# Patient Record
Sex: FEMALE | Race: WHITE | NOT HISPANIC OR LATINO | Employment: OTHER | URBAN - METROPOLITAN AREA
[De-identification: names, ages, dates, MRNs, and addresses within clinical notes are randomized per-mention and may not be internally consistent; named-entity substitution may affect disease eponyms.]

---

## 2017-08-14 ENCOUNTER — ALLSCRIPTS OFFICE VISIT (OUTPATIENT)
Dept: OTHER | Facility: OTHER | Age: 82
End: 2017-08-14

## 2017-08-14 DIAGNOSIS — R26.9 ABNORMALITY OF GAIT AND MOBILITY: ICD-10-CM

## 2017-08-14 DIAGNOSIS — Z13.6 ENCOUNTER FOR SCREENING FOR CARDIOVASCULAR DISORDERS: ICD-10-CM

## 2017-08-14 DIAGNOSIS — R53.83 OTHER FATIGUE: ICD-10-CM

## 2017-08-14 DIAGNOSIS — E03.9 HYPOTHYROIDISM: ICD-10-CM

## 2017-08-14 DIAGNOSIS — Z13.820 ENCOUNTER FOR SCREENING FOR OSTEOPOROSIS: ICD-10-CM

## 2017-08-18 ENCOUNTER — HOSPITAL ENCOUNTER (OUTPATIENT)
Dept: RADIOLOGY | Facility: HOSPITAL | Age: 82
Discharge: HOME | End: 2017-08-18
Attending: INTERNAL MEDICINE
Payer: COMMERCIAL

## 2017-08-18 ENCOUNTER — TRANSCRIBE ORDERS (OUTPATIENT)
Dept: ADMINISTRATIVE | Facility: HOSPITAL | Age: 82
End: 2017-08-18

## 2017-08-18 ENCOUNTER — LAB (OUTPATIENT)
Dept: LAB | Facility: HOSPITAL | Age: 82
End: 2017-08-18
Attending: INTERNAL MEDICINE
Payer: COMMERCIAL

## 2017-08-18 DIAGNOSIS — Z13.6 ENCOUNTER FOR SCREENING FOR CARDIOVASCULAR DISORDERS: ICD-10-CM

## 2017-08-18 DIAGNOSIS — R53.83 OTHER FATIGUE: ICD-10-CM

## 2017-08-18 DIAGNOSIS — E03.9 HYPOTHYROIDISM: ICD-10-CM

## 2017-08-18 DIAGNOSIS — R26.9 ABNORMALITY OF GAIT AND MOBILITY: ICD-10-CM

## 2017-08-18 LAB
ALBUMIN SERPL BCP-MCNC: 3.6 G/DL (ref 3.5–5)
ALP SERPL-CCNC: 79 U/L (ref 46–116)
ALT SERPL W P-5'-P-CCNC: 19 U/L (ref 12–78)
ANION GAP SERPL CALCULATED.3IONS-SCNC: 9 MMOL/L (ref 4–13)
AST SERPL W P-5'-P-CCNC: 18 U/L (ref 5–45)
BASOPHILS # BLD AUTO: 0.06 THOUSANDS/ΜL (ref 0–0.1)
BASOPHILS NFR BLD AUTO: 1 % (ref 0–1)
BILIRUB SERPL-MCNC: 0.5 MG/DL (ref 0.2–1)
BUN SERPL-MCNC: 17 MG/DL (ref 5–25)
CALCIUM SERPL-MCNC: 8.8 MG/DL (ref 8.3–10.1)
CHLORIDE SERPL-SCNC: 102 MMOL/L (ref 100–108)
CHOLEST SERPL-MCNC: 317 MG/DL (ref 50–200)
CO2 SERPL-SCNC: 27 MMOL/L (ref 21–32)
CREAT SERPL-MCNC: 0.59 MG/DL (ref 0.6–1.3)
EOSINOPHIL # BLD AUTO: 0.12 THOUSAND/ΜL (ref 0–0.61)
EOSINOPHIL NFR BLD AUTO: 2 % (ref 0–6)
ERYTHROCYTE [DISTWIDTH] IN BLOOD BY AUTOMATED COUNT: 13.1 % (ref 11.6–15.1)
GFR SERPL CREATININE-BSD FRML MDRD: 85 ML/MIN/1.73SQ M
GLUCOSE P FAST SERPL-MCNC: 88 MG/DL (ref 65–99)
HCT VFR BLD AUTO: 36.3 % (ref 34.8–46.1)
HDLC SERPL-MCNC: 100 MG/DL (ref 40–60)
HGB BLD-MCNC: 11.6 G/DL (ref 11.5–15.4)
LDLC SERPL CALC-MCNC: 205 MG/DL (ref 0–100)
LYMPHOCYTES # BLD AUTO: 2.62 THOUSANDS/ΜL (ref 0.6–4.47)
LYMPHOCYTES NFR BLD AUTO: 36 % (ref 14–44)
MCH RBC QN AUTO: 29.4 PG (ref 26.8–34.3)
MCHC RBC AUTO-ENTMCNC: 32 G/DL (ref 31.4–37.4)
MCV RBC AUTO: 92 FL (ref 82–98)
MONOCYTES # BLD AUTO: 0.57 THOUSAND/ΜL (ref 0.17–1.22)
MONOCYTES NFR BLD AUTO: 8 % (ref 4–12)
NEUTROPHILS # BLD AUTO: 3.87 THOUSANDS/ΜL (ref 1.85–7.62)
NEUTS SEG NFR BLD AUTO: 53 % (ref 43–75)
NRBC BLD AUTO-RTO: 0 /100 WBCS
PLATELET # BLD AUTO: 213 THOUSANDS/UL (ref 149–390)
PMV BLD AUTO: 11.7 FL (ref 8.9–12.7)
POTASSIUM SERPL-SCNC: 4.1 MMOL/L (ref 3.5–5.3)
PROT SERPL-MCNC: 7.1 G/DL (ref 6.4–8.2)
RBC # BLD AUTO: 3.95 MILLION/UL (ref 3.81–5.12)
SODIUM SERPL-SCNC: 138 MMOL/L (ref 136–145)
T4 FREE SERPL-MCNC: 0.9 NG/DL (ref 0.76–1.46)
TRIGL SERPL-MCNC: 58 MG/DL
TSH SERPL DL<=0.05 MIU/L-ACNC: 3.06 UIU/ML (ref 0.36–3.74)
VIT B12 SERPL-MCNC: 329 PG/ML (ref 100–900)
WBC # BLD AUTO: 7.25 THOUSAND/UL (ref 4.31–10.16)

## 2017-08-18 PROCEDURE — 84443 ASSAY THYROID STIM HORMONE: CPT

## 2017-08-18 PROCEDURE — 82607 VITAMIN B-12: CPT

## 2017-08-18 PROCEDURE — 84439 ASSAY OF FREE THYROXINE: CPT

## 2017-08-18 PROCEDURE — 80061 LIPID PANEL: CPT

## 2017-08-18 PROCEDURE — 85025 COMPLETE CBC W/AUTO DIFF WBC: CPT

## 2017-08-18 PROCEDURE — 36415 COLL VENOUS BLD VENIPUNCTURE: CPT

## 2017-08-18 PROCEDURE — 72110 X-RAY EXAM L-2 SPINE 4/>VWS: CPT

## 2017-08-18 PROCEDURE — 80053 COMPREHEN METABOLIC PANEL: CPT

## 2017-08-22 ENCOUNTER — GENERIC CONVERSION - ENCOUNTER (OUTPATIENT)
Dept: OTHER | Facility: OTHER | Age: 82
End: 2017-08-22

## 2017-09-14 ENCOUNTER — ALLSCRIPTS OFFICE VISIT (OUTPATIENT)
Dept: OTHER | Facility: OTHER | Age: 82
End: 2017-09-14

## 2017-09-21 ENCOUNTER — HOSPITAL ENCOUNTER (OUTPATIENT)
Dept: MAMMOGRAPHY | Facility: CLINIC | Age: 82
Discharge: HOME | End: 2017-09-21
Payer: COMMERCIAL

## 2017-09-21 ENCOUNTER — GENERIC CONVERSION - ENCOUNTER (OUTPATIENT)
Dept: OTHER | Facility: OTHER | Age: 82
End: 2017-09-21

## 2017-09-21 DIAGNOSIS — Z13.820 ENCOUNTER FOR SCREENING FOR OSTEOPOROSIS: ICD-10-CM

## 2017-09-21 PROCEDURE — 77080 DXA BONE DENSITY AXIAL: CPT

## 2017-11-02 ENCOUNTER — TRANSCRIBE ORDERS (OUTPATIENT)
Dept: ADMINISTRATIVE | Facility: HOSPITAL | Age: 82
End: 2017-11-02

## 2017-11-02 ENCOUNTER — ALLSCRIPTS OFFICE VISIT (OUTPATIENT)
Dept: OTHER | Facility: OTHER | Age: 82
End: 2017-11-02

## 2017-11-02 DIAGNOSIS — G45.9 TRANSIENT CEREBRAL ISCHEMIC ATTACK: ICD-10-CM

## 2017-11-02 DIAGNOSIS — G45.9 INTERMITTENT CEREBRAL ISCHEMIA: Primary | ICD-10-CM

## 2017-11-02 DIAGNOSIS — G62.9 POLYNEUROPATHY: ICD-10-CM

## 2017-11-03 NOTE — CONSULTS
Assessment  1  Gait disturbance (781 2) (R26 9)   2  TIA (transient ischemic attack) (435 9) (G45 9)   3  Polyneuropathy (356 9) (G62 9)    Plan  Polyneuropathy    · (1) LYME ANTIBODY, WESTERN BLOT; Status:Active; Requested ALW:[id number];    Perform:[facility]; ZRD:[id number]; Ordered; For:Polyneuropathy; Ordered By:Sonny Aranda;   · (1) PROTEIN ELECTRO, SERUM; Status:Active; Requested CMN:[id number];    Perform:[facility]; BAA:[id number]; Ordered; For:Polyneuropathy; Ordered By:Sonny Aranda;  TIA (transient ischemic attack)    · Aspirin 81 MG Oral Tablet Chewable; USE AS DIRECTED   Rx By: Radha Panchal; Dispense: 30 Days ; #:30 Tablet Chewable; Refill: 0;For: TIA (transient ischemic attack); JONES = N; Record   · * MRI BRAIN WO CONTRAST; Status:Need Information - Financial Authorization; Requested ICF:[id number];    Perform:[facility]; RJL:[id number]; Ordered; For:TIA (transient ischemic attack); Ordered By:Sonny Aranda;   · VAS CAROTID COMPLETE STUDY; SIDE:Bilateral; Status:Hold For - Scheduling;  Requested [id number];    Perform:[facility]; Ash Dubois; Ordered; For:TIA (transient ischemic attack); Ordered By:Sonny Aranda;   · *1 - [facility]  *  Status: Active  Requested for: 74KNZ6707   Ordered; For: TIA (transient ischemic attack); Ordered By: Radha Panchal Performed:  Due: 93LFC3022  Care Summary provided  : Yes   · Follow-up visit in 2 months Evaluation and Treatment  Follow-up  Status: Hold For -  Scheduling  Requested for: 86LJR7529   Ordered; For: TIA (transient ischemic attack); Ordered By: Radha Panchal Performed:  Due: 92LYQ5906   · Continue with our present treatment plan ; Status:Complete;   Done: 05MDO3548   Ordered; For:TIA (transient ischemic attack);  Ordered By:Sonny Aranda;    Discussion/Summary  Discussion Summary:   Patient is a 80-year-old very pleasant lady complaining of gait imbalance and episodic dizziness most likely as a result of early polyneuropathy  The incident she had in February of this year could have been a TIA, with a history of hyperlipidemia and borderline hypertension she also has a soft bruit in the left carotid  Patient is advise an MRI of the brain and a carotid ultrasound at this time and is advised to start baby aspirin 81 mg daily  For the gait imbalance secondary to her polyneuropathy she is advised protein electrophoresis and a Lyme titer with rest of the blood work being recently done  Physical therapy with gait training and balance exercises will be useful  She is advised to return back to see me in 2 months  Chief Complaint  Chief Complaint Free Text Note Form: 80year old right handed female was referred by Valerie Petersen for gait disturbance  Patient states that she feels off balance at times  History of Present Illness  HPI: Patient is a 59-year-old right-handed very pleasant lady who comes to us today with gait unsteadiness, and lightheadedness  In February of this year while the patient was in [place] she had an episode when she woke up 1 morning her legs felt extremely heavy  This episode lasted for a few hours and subsequently resolved and she did not seek any medical attention  Since then she has been experiencing a sense of imbalance, associated with lightheadedness occurring intermittently  She denies any vertigo, tinnitus, and denies any other central neurological symptoms  She also denies any speech or gait difficulty or any motor or sensory symptoms in the upper lower extremities  Several years ago she had a whiplash injury to the neck during which time she also felt very foggy for a prolonged period of time and gradually cleared  At baseline she has a history of hyperlipidemia, and has refused to take medications in the past, was recently checked for B12 and noted to have a low normal B12 level, and also suffers from osteoporosis and has lost 4 inches of height  Patient does not wish to take any medications  Review of Systems  Neurological ROS:   Constitutional: fatigue  HEENT: dryness of the eyes  Cardiovascular:  no chest pain or pressure, no palpitations present, the heart rate was not rapid or irregular, no swelling in the arms or legs, no poor circulation  Respiratory:  no unusual or persistant cough, no shortness of breath with or without exertion  Gastrointestinal:  no nausea, no vomiting, no diarrhea, no abdominal pain, no changes in bowel habits, no melena, no loss of bowel control  Genitourinary: increased frequency  Musculoskeletal:  no arthralgias, no myalgias, no immobility or loss of function, no head/neck/back pain, no pain while walking  Integumentary  no masses, no rash, no skin lesions, no livedo reticularis  Psychiatric:  no anxiety, no depression, no mood swings, no psychiatric hospitalizations, no sleep problems  Endocrine hair loss or gain  Hematologic/Lymphatic:  no unusual bleeding, no tendency for easy bruising, no clotting skin or lumps  Neurological General: lightheadedness-- and-- waking up at night  Neurological Mental Status: memory problems  Neurological Cranial Nerves: vertigo or dizziness  Neurological Motor findings include:  no tremor, no twitching, no cramping(pre/post exercise), no atrophy  Neurological Coordination:  no unsteadiness, no vertigo or dizziness, no clumsiness, no problems reaching for objects  Neurological Sensory:  no numbness, no pain, no tingling, does not fall when eyes closed or taking a shower  Neurological Gait: difficulty walking  ROS Reviewed:   ROS reviewed  Active Problems  1  Chronic insomnia (780 52) (F51 04)   2  Fatigue (780 79) (R53 83)   3  Gait disturbance (781 2) (R26 9)   4  Hypothyroidism, adult (244 9) (E03 9)   5  Osteoporosis screening (V82 81) (Z13 820)   6  Screening for cardiovascular condition (V81 2) (Z13 6)   7   Screening for genitourinary condition (V81 6) (Z13 89)    Past Medical History  1  Denied: History of mental disorder  Active Problems And Past Medical History Reviewed: The active problems and past medical history were reviewed and updated today  Surgical History  1  History of Foot Surgery  Surgical History Reviewed: The surgical history was reviewed and updated today  Family History  Mother    1  Family history of    2  Family history of Parkinson's disease (V17 2) (Z82 0)  Father    3  Family history of    4  Family history of cardiac disorder (V17 49) (Z82 49)   5  Family history of diabetes mellitus (V18 0) (Z83 3)   6  Family history of hypercholesterolemia (V18 19) (Z83 42)   7  Family history of hypertension (V17 49) (Z82 49)  Family History    8  Denied: Family history of mental disorder   9  Denied: Family history of substance abuse  Family History Reviewed: The family history was reviewed and updated today  Social History   · Caffeine use (V49 89) (F15 90)   · Former smoker (V15 82) (J54 645)   · Graduated from high school   · Denied: History of High risk sexual behavior   · Lives with adult children   · Lives with    ·    · No illicit drug use   · Occasional alcohol use   · Retired   · Two children  Social History Reviewed: The social history was reviewed and updated today  Current Meds   1  No Reported Medications Recorded  Medication List Reviewed: The medication list was reviewed and updated today  Allergies  1  Penicillins  2  Latex   3  Mold   4  Other    Vitals  Signs   Recorded: 94DXY5663 03:19PM   Heart Rate: 90  Systolic: 385  Diastolic: 88  Height: 5 ft   Weight: 110 lb   BMI Calculated: 21 48  BSA Calculated: 1 45    Physical Exam    Constitutional   General Appearance: Appears appropriate for age, healthy, well developed, appropriately groomed and appropriately dressed    Head and Face   Head and face: Atraumatic on inspection   Facial strength normal    Eyes Ophthalmoscopic examination: Vision is grossly normal  Gross visual field testing by confrontation shows no abnormalities  EOMI in both eyes  Conjunctivae clear  Eyelids normal palpebral fissures equal  Orbits exhibit normal position  No discharge from the eyes  PERRL  External inspection of ears and nose: Normal       Neck   Neck and thyroid: Normal to inspection and palpation  Cardiovascular   Auscultation of heart: Rate is regular  Rhythm is regular  Carotid pulses: Abnormal  -- Patient has a soft bruit in the left carotid  Musculoskeletal   Gait and station: Abnormal  -- Patient has a normal based gait, at times tends to sway on either side and has a positive sway on Romberg testing  Muscle strength: Normal strength throughout  Muscle tone: No atrophy, abnormal movements, flaccidity, cogwheeling or spasticity  Inspection/palpation joints, bones, muscles: Normal          Skin   Skin: skin warm and dry with no evidence of unusual rashes or suspicious lesions  Neurologic   Orientation to person, place, and time: Normal     Language: Names objects, able to repeat phrases and speaks spontaneously  Sensation: Abnormal  -- Patient has diminished sensation to pinprick and light touch in the feet bilaterally up to the ankles  She also has diminished vibratory sensation in both feet  Reflexes: DTR's are normal and symmetric bilaterally  Babinski's reflex is negative bilaterally  No pathologic ankle clonus  Coordination: Cerebellum function intact  No involuntary movement or psychomotor activity  Cortical function: Normal     Cranial Nerve Exam   II: Normal with no deficit  III,IV, VI: Normal with no deficit  V: Normal with no deficit  VII: Normal with no deficit  VIII: Normal with no deficit  IX: Normal with no deficit  X: Normal with no deficit  XI: Normal with no deficit  XII: Normal with no deficit             Future Appointments    Date/Time Provider Specialty Site 12/19/2017 10:40 AM Ra De La Torre, [facility] Internal Medicine [facility] INTERNAL MED     Signatures   Electronically signed by :  Kathleen Castro MD; Nov 2 2017  4:05PM EST                       (Author)

## 2017-11-10 ENCOUNTER — HOSPITAL ENCOUNTER (OUTPATIENT)
Dept: MRI IMAGING | Facility: HOSPITAL | Age: 82
Discharge: HOME | End: 2017-11-10
Attending: PSYCHIATRY & NEUROLOGY
Payer: COMMERCIAL

## 2017-11-10 ENCOUNTER — HOSPITAL ENCOUNTER (OUTPATIENT)
Dept: ULTRASOUND IMAGING | Facility: HOSPITAL | Age: 82
Discharge: HOME | End: 2017-11-10
Attending: PSYCHIATRY & NEUROLOGY
Payer: COMMERCIAL

## 2017-11-10 DIAGNOSIS — G45.9 TRANSIENT CEREBRAL ISCHEMIC ATTACK: ICD-10-CM

## 2017-11-10 PROCEDURE — 70551 MRI BRAIN STEM W/O DYE: CPT

## 2017-11-10 PROCEDURE — 93880 EXTRACRANIAL BILAT STUDY: CPT

## 2017-11-13 ENCOUNTER — APPOINTMENT (OUTPATIENT)
Dept: LAB | Facility: CLINIC | Age: 82
End: 2017-11-13
Payer: COMMERCIAL

## 2017-11-13 ENCOUNTER — TRANSCRIBE ORDERS (OUTPATIENT)
Dept: LAB | Facility: CLINIC | Age: 82
End: 2017-11-13

## 2017-11-13 DIAGNOSIS — G62.9 POLYNEUROPATHY: ICD-10-CM

## 2017-11-13 PROCEDURE — 36415 COLL VENOUS BLD VENIPUNCTURE: CPT

## 2017-11-13 PROCEDURE — 84165 PROTEIN E-PHORESIS SERUM: CPT

## 2017-11-13 PROCEDURE — 86617 LYME DISEASE ANTIBODY: CPT

## 2017-11-15 LAB
B BURGDOR IGG PATRN SER IB-IMP: NEGATIVE
B BURGDOR IGM PATRN SER IB-IMP: NEGATIVE
B BURGDOR18KD IGG SER QL IB: NORMAL
B BURGDOR23KD IGG SER QL IB: NORMAL
B BURGDOR23KD IGM SER QL IB: NORMAL
B BURGDOR28KD IGG SER QL IB: NORMAL
B BURGDOR30KD IGG SER QL IB: NORMAL
B BURGDOR39KD IGG SER QL IB: NORMAL
B BURGDOR39KD IGM SER QL IB: NORMAL
B BURGDOR41KD IGG SER QL IB: NORMAL
B BURGDOR41KD IGM SER QL IB: NORMAL
B BURGDOR45KD IGG SER QL IB: NORMAL
B BURGDOR58KD IGG SER QL IB: NORMAL
B BURGDOR66KD IGG SER QL IB: NORMAL
B BURGDOR93KD IGG SER QL IB: NORMAL

## 2017-11-16 LAB
ALBUMIN SERPL ELPH-MCNC: 4.52 G/DL (ref 3.5–5)
ALBUMIN SERPL ELPH-MCNC: 62.8 % (ref 52–65)
ALPHA1 GLOB SERPL ELPH-MCNC: 0.31 G/DL (ref 0.1–0.4)
ALPHA1 GLOB SERPL ELPH-MCNC: 4.3 % (ref 2.5–5)
ALPHA2 GLOB SERPL ELPH-MCNC: 0.88 G/DL (ref 0.4–1.2)
ALPHA2 GLOB SERPL ELPH-MCNC: 12.2 % (ref 7–13)
BETA GLOB ABNORMAL SERPL ELPH-MCNC: 0.43 G/DL (ref 0.4–0.8)
BETA1 GLOB SERPL ELPH-MCNC: 6 % (ref 5–13)
BETA2 GLOB SERPL ELPH-MCNC: 4.7 % (ref 2–8)
BETA2+GAMMA GLOB SERPL ELPH-MCNC: 0.34 G/DL (ref 0.2–0.5)
GAMMA GLOB ABNORMAL SERPL ELPH-MCNC: 0.72 G/DL (ref 0.5–1.6)
GAMMA GLOB SERPL ELPH-MCNC: 10 % (ref 12–22)
IGG/ALB SER: 1.69 {RATIO} (ref 1.1–1.8)
PROT PATTERN SERPL ELPH-IMP: ABNORMAL
PROT SERPL-MCNC: 7.2 G/DL (ref 6.4–8.2)

## 2017-12-19 ENCOUNTER — ALLSCRIPTS OFFICE VISIT (OUTPATIENT)
Dept: OTHER | Facility: OTHER | Age: 82
End: 2017-12-19

## 2017-12-20 ENCOUNTER — ALLSCRIPTS OFFICE VISIT (OUTPATIENT)
Dept: OTHER | Facility: OTHER | Age: 82
End: 2017-12-20

## 2017-12-20 NOTE — PROGRESS NOTES
Assessment  1  Allergic rhinitis (477 9) (J30 9)   2  Acute pansinusitis (461 8) (J01 40)    Plan  Acute pansinusitis    · Azithromycin 250 MG Oral Tablet; TAKE 2 TABLETS ON DAY 1 THEN TAKE 1 TABLET A DAYFOR 4 DAYS  Fatigue    · (1) CBC/PLT/DIFF; Status:Active; Requested for:05Apr2018;   Hypothyroidism, adult    · (1) COMPREHENSIVE METABOLIC PANEL; Status:Active; Requested for:05Apr2018;    · (1) T4, FREE; Status:Active; Requested for:05Apr2018;    · (1) TSH; Status:Active; Requested for:05Apr2018;    · Follow-up visit in 4 Months Evaluation and Treatment  Follow-up  Status: Hold For - Scheduling Requested for: 05Apr2018  Need for pneumococcal vaccine    · Prevnar 13 Intramuscular Suspension    Discussion/Summary  Discussion Summary:   Follow up with neuro tomorrow as scheduled  Continue baby aspirin  Follow here on returning from [place]  Advised patient that while she is up [place] use saline nasal spray 3-4 times daily  Counseling Documentation With Imm: The patient, patient's family was counseled regarding diagnostic results,-- instructions for management,-- risk factor reductions,-- impressions,-- risks and benefits of treatment options,-- importance of compliance with treatment  Medication SE Review and Pt Understands Tx: Possible side effects of new medications were reviewed with the patient/guardian today  The treatment plan was reviewed with the patient/guardian  The patient/guardian understands and agrees with the treatment plan   Self Referrals:   Self Referrals: No      Chief Complaint  Chief Complaint Free Text Note Form: Patient here for follow up  History of Present Illness  HPI: Follow-up80year-old female, generally healthy, seen Neurology due to gait disturbance  She was diagnosed with a TIA and polyneuropathy  She was advised to start on 81 mg aspirin daily which he has not been very compliant with, however I ask her to be more compliant and take it daily  Her previous fatigue is pretty much stable  They will be going to [place] for the winter and she remarks that she feels much better when she is down there  She complains of sinus congestion particularly at night where her mouth dries out in her nose and she has difficulty breathing  She has no specific sinus congestion  She tries to use a Neti pot daily  No fever chills  She does complain of postnasal drip especially at night  No problematic cough  No ear complaints  Review of Systems  Complete-Female:  Constitutional: no fever,-- not feeling poorly,-- no chills-- and-- not feeling tired  ENT: no complaints of earache, no loss of hearing, no nose bleeds, no nasal discharge, no sore throat, no hoarseness-- and-- as noted in HPI  Cardiovascular: No complaints of slow heart rate, no fast heart rate, no chest pain, no palpitations, no leg claudication, no lower extremity edema  Respiratory: No complaints of shortness of breath, no wheezing, no cough, no SOB on exertion, no orthopnea, no PND  Gastrointestinal: no abdominal pain  Genitourinary: no dysuria  Integumentary: no rashes  Neurological: no headache,-- no dizziness-- and-- no fainting  Hematologic/Lymphatic: No complaints of swollen glands, no swollen glands in the neck, does not bleed easily, does not bruise easily  Active Problems  1  Chronic insomnia (780 52) (F51 04)   2  Fatigue (780 79) (R53 83)   3  Gait disturbance (781 2) (R26 9)   4  Hypothyroidism, adult (244 9) (E03 9)   5  Osteoporosis screening (V82 81) (Z13 820)   6  Polyneuropathy (356 9) (G62 9)   7  Screening for cardiovascular condition (V81 2) (Z13 6)   8  Screening for genitourinary condition (V81 6) (Z13 89)   9  TIA (transient ischemic attack) (435 9) (G45 9)    Past Medical History  1  Denied: History of mental disorder  Active Problems And Past Medical History Reviewed: The active problems and past medical history were reviewed and updated today  Surgical History  1   History of Foot Surgery  Surgical History Reviewed: The surgical history was reviewed and updated today  Family History  Mother    1  Family history of    2  Family history of Parkinson's disease (V17 2) (Z82 0)  Father    3  Family history of    4  Family history of cardiac disorder (V17 49) (Z82 49)   5  Family history of diabetes mellitus (V18 0) (Z83 3)   6  Family history of hypercholesterolemia (V18 19) (Z83 42)   7  Family history of hypertension (V17 49) (Z82 49)  Family History    8  Denied: Family history of mental disorder   9  Denied: Family history of substance abuse  Family History Reviewed: The family history was reviewed and updated today  Social History   · Caffeine use (V49 89) (F15 90)   · Former smoker (V15 82) (L67 351)   · Graduated from high school   · Denied: History of High risk sexual behavior   · Lives with adult children   · Lives with    ·    · No illicit drug use   · Occasional alcohol use   · Retired   · Two children  Social History Reviewed: The social history was reviewed and updated today  The social history was reviewed and is unchanged  Current Meds   1  Aspirin 81 MG Oral Tablet Chewable; USE AS DIRECTED; Therapy: 74QBT0914 to (Evaluate:96Bmw1903); Last Rx:2017 Ordered  Medication List Reviewed: The medication list was reviewed and updated today  Allergies  1  Penicillins  2  Latex   3  Mold   4  Other    Vitals  Vital Signs    Recorded: 00EME0090 10:33AM   Temperature 98 4 F   Heart Rate 64   Systolic 152   Diastolic 92   Height 5 ft    Weight 111 lb    BMI Calculated 21 68   BSA Calculated 1 45   O2 Saturation 94     Physical Exam   Constitutional  General appearance: No acute distress, well appearing and well nourished  Eyes  Conjunctiva and lids: No swelling, erythema or discharge  Ears, Nose, Mouth, and Throat  External inspection of ears and nose: Normal    Otoscopic examination: Tympanic membranes translucent with normal light reflex  Canals patent without erythema  Nasal mucosa, septum, and turbinates: Abnormal  -- Inferior nasal turbinates are enlarged but pink, whitish nasal drainage  Oropharynx: Normal with no erythema, edema, exudate or lesions  Pulmonary  Respiratory effort: No increased work of breathing or signs of respiratory distress  Auscultation of lungs: Clear to auscultation  Cardiovascular  Auscultation of heart: Normal rate and rhythm, normal S1 and S2, without murmurs  Examination of extremities for edema and/or varicosities: Normal    Carotid pulses: Normal    Lymphatic  Palpation of lymph nodes in neck: No lymphadenopathy  Musculoskeletal  Gait and station: Normal    Skin  Skin and subcutaneous tissue: Normal without rashes or lesions     Psychiatric  Mood and affect: Normal          Future Appointments    Date/Time Provider Specialty Site   12/20/2017 02:20 PM Dale Neal MD Neurology [facility] [address]   Electronically signed by : Lissette Strauss, [facility]; Dec 19 2017 11:44AM EST                       (Author)    Electronically signed by : Moris Valera MD; Dec 19 2017 11:54AM EST

## 2017-12-21 NOTE — PROGRESS NOTES
Assessment   1  TIA (transient ischemic attack) (435 9) (G45 9)   2  Polyneuropathy (356 9) (G62 9)    Plan   Polyneuropathy    · Continue with our present treatment plan ; Status:Complete;   Done: 48YOK6268   Ordered; For:Polyneuropathy; Ordered By:Sonny Aranda;   · Follow-up visit in 6 months Evaluation and Treatment  Follow-up  Status: Complete     Done: 33YPO3901   Ordered; For: Polyneuropathy; Ordered By: Giovanny Traylor Performed:  Due: 63RTY3692; Last Updated By: Mayco Haddad; 12/20/2017 2:25:58 PM    Discussion/Summary   Discussion Summary:    Patient with a history of mild polyneuropathy is advised to continue B12 supplements, continue home exercise program, continue aspirin 81 mg daily and she will return back to see me in 6 months  Chief Complaint   Chief Complaint Free Text Note Form: Patient is here for follow up visit for her history of gait disturbance, TIA, and polyneuropathy  History of Present Illness   HPI: Patient is here for a follow-up visit accompanied with the  with a history of TIA and gait imbalance, felt to be secondary to polyneuropathy  Since her last visit she had an MRI of the brain which showed evidence of chronic ischemic changes but no evidence of any acute CVA  Carotid ultrasound showed evidence of atheromatous plaquing with less than 50% stenosis bilaterally  Serum Lyme titer and protein adequate for Fredo's was also normal  These test results were discussed with the patient and   She denies any new neurological symptoms occasionally still continues to experience gait imbalance  She is on B12 supplements  Review of Systems   Neurological ROS:      Constitutional: no fever, no chills, no recent weight gain, no recent weight loss, no complaints of feeling tired, no changes in appetite  HEENT: sinus problems        Cardiovascular:  no chest pain or pressure, no palpitations present, the heart rate was not rapid or irregular, no swelling in the arms or legs, no poor circulation  Respiratory:  no unusual or persistant cough, no shortness of breath with or without exertion  Gastrointestinal:  no nausea, no vomiting, no diarrhea, no abdominal pain, no changes in bowel habits, no melena, no loss of bowel control  Genitourinary: increased frequency  Musculoskeletal:  no arthralgias, no myalgias, no immobility or loss of function, no head/neck/back pain, no pain while walking  Integumentary  no masses, no rash, no skin lesions, no livedo reticularis  Psychiatric:  no anxiety, no depression, no mood swings, no psychiatric hospitalizations, no sleep problems  Endocrine hair loss or gain  Hematologic/Lymphatic:  no unusual bleeding, no tendency for easy bruising, no clotting skin or lumps  Neurological General: waking up at night  Neurological Mental Status: memory problems  Neurological Cranial Nerves:  no blurry or double vision, no loss of vision, no face drooping, no facial numbness or weakness, no taste or smell loss/changes, no hearing loss or ringing, no vertigo or dizziness, no dysphagia, no slurred speech  Neurological Motor findings include:  no tremor, no twitching, no cramping(pre/post exercise), no atrophy  Neurological Coordination: balance difficulties  Neurological Sensory:  no numbness, no pain, no tingling, does not fall when eyes closed or taking a shower  Neurological Gait: difficulty walking  ROS Reviewed:    ROS reviewed  Active Problems   1  Acute pansinusitis (461 8) (J01 40)   2  Allergic rhinitis (477 9) (J30 9)   3  Chronic insomnia (780 52) (F51 04)   4  Fatigue (780 79) (R53 83)   5  Gait disturbance (781 2) (R26 9)   6  Hypothyroidism, adult (244 9) (E03 9)   7  Need for pneumococcal vaccine (V03 82) (Z23)   8  Osteoporosis screening (V82 81) (Z13 820)   9  Polyneuropathy (356 9) (G62 9)   10  Screening for cardiovascular condition (V81 2) (Z13 6)   11  Screening for genitourinary condition (V81 6) (Z13 89)   12  TIA (transient ischemic attack) (435 9) (G45 9)    Past Medical History   1  Denied: History of mental disorder    Surgical History   1  History of Foot Surgery    Family History   Mother    1  Family history of    2  Family history of Parkinson's disease (V17 2) (Z82 0)  Father    3  Family history of    4  Family history of cardiac disorder (V17 49) (Z82 49)   5  Family history of diabetes mellitus (V18 0) (Z83 3)   6  Family history of hypercholesterolemia (V18 19) (Z83 42)   7  Family history of hypertension (V17 49) (Z82 49)  Family History    8  Denied: Family history of mental disorder   9  Denied: Family history of substance abuse    Social History    · Caffeine use (V49 89) (F15 90)   · Former smoker (V1 82) (E45 703)   · Graduated from high school   · Denied: History of High risk sexual behavior   · Lives with adult children   · Lives with    ·    · No illicit drug use   · Occasional alcohol use   · Retired   · Two children  Social History Reviewed: The social history was reviewed and updated today  Current Meds    1  Aspirin 81 MG Oral Tablet Chewable; USE AS DIRECTED; Therapy: 03YMX7738 to (Evaluate:54Jnn7379); Last Rx:2017 Ordered   2  Azithromycin 250 MG Oral Tablet; TAKE 2 TABLETS ON DAY 1 THEN TAKE 1 TABLET A     DAY FOR 4 DAYS; Therapy: 14JTB0260 to (Last Rx:63Kbe6557) Ordered  Medication List Reviewed: The medication list was reviewed and updated today  Allergies   1  Penicillins  2  Latex   3  Mold   4  Other    Vitals   Signs   Recorded: 2017 01:56PM   Heart Rate: 72  Systolic: 113  Diastolic: 88  Height: 5 ft 1 in  Weight: 117 lb   BMI Calculated: 22 11  BSA Calculated: 1 5    Physical Exam        Constitutional      General appearance: No acute distress, well appearing and well nourished  Musculoskeletal      Gait and station: Normal gait, stance and balance  Muscle strength: Normal strength throughout  Muscle tone: No atrophy, abnormal movements, flaccidity, cogwheeling or spasticity  Neurologic      Orientation to person, place, and time: Normal        Language: Names objects, able to repeat phrases and speaks spontaneously  3rd, 4th, and 6th cranial nerves: Normal        7th cranial nerve: Normal        Sensation: Abnormal  -- Diminished sensation was noted in both feet to pinprick and light touch  Reflexes: Normal        Coordination: Normal        Future Appointments      Date/Time Provider Specialty Site   05/03/2018 08:00 [facility]     Signatures    Electronically signed by :  Mony Zapata MD; Dec 20 2017  2:35PM EST                       (Author)

## 2018-01-12 VITALS
BODY MASS INDEX: 20.2 KG/M2 | HEIGHT: 61 IN | OXYGEN SATURATION: 99 % | HEART RATE: 70 BPM | SYSTOLIC BLOOD PRESSURE: 138 MMHG | DIASTOLIC BLOOD PRESSURE: 84 MMHG | TEMPERATURE: 98.6 F | WEIGHT: 107 LBS

## 2018-01-12 NOTE — RESULT NOTES
Verified Results  * DXA BONE DENSITY SPINE HIP AND PELVIS [id number] 09:07AM Master Orestes Order Number: [id number]    - Patient Instructions: To schedule this appointment, please contact Central Scheduling at [phone]  Test Name Result Flag Reference   DXA BONE DENSITY SPINE HIP AND PELVIS (Report)     CENTRAL DXA SCAN     CLINICAL HISTORY:  80year old post-menopausal  female who walks and who does not take calcium or vitamin D supplements  There is a stated loss in height of 2 1/2 inches  TECHNIQUE: Bone densitometry was performed using a H[employer] Horizon C bone densitometer  Regions of interest appear properly placed  There are degenerative changes of the lumbar spine, which can artificially elevate bone mineral density results  COMPARISON: None  RESULTS:    LUMBAR SPINE: L1-L4:   BMD 0 657 gm/cm2   T-score -3 5   Z-score -0 7     LEFT TOTAL HIP:   BMD 0 576 gm/cm2   T-score -3 0   Z-score -0 7     LEFT FEMORAL NECK:   BMD 0 492 gm/cm2   T-score -3 2   Z-score -0 8     LEFT FOREARM-ONE 3RD REGION:   BMD 0 524 gm/cm2   T-score -2 8   Z-score 0 9         IMPRESSION:   1  Based on the [facility] classification, the T-score of -3 5 in the lumbar spine is consistent with osteoporosis  2  [facility], prescription therapy is recommended with a T-score of -2 5 or less in the spine or hip after appropriate evaluation to exclude secondary causes  3  With the stated loss in height of 2 1/2 inches, consideration to obtaining thoraco-lumbar spine films may be helpful to exclude silent vertebral fractures, which can increase the risk for future fracture  4  A daily intake of at least 1200 mg Calcium and 800 to 1000 IU of Vitamin D, as well as weight bearing and muscle strengthening exercise, fall prevention and avoidance of tobacco and excessive alcohol intake as basic preventive measures are    suggested     5  Repeat DXA in 18 - 24 months, on the same machine, as clinically indicated  The 10 year risk of hip fracture is 9 4%, with the 10 year risk of major osteoporotic fracture being 21%, as calculated by the [facility] fracture risk assessment tool (FRAX)  The current NOF guidelines recommend treating patients with FRAX 10 year risk score    of >3% for hip fracture and >20% for major osteoporotic fracture        WHO CLASSIFICATION:   Normal (a T-score of -1 0 or higher)   Low bone mineral density (a T-score of less than -1 0 but higher than -2 5)   Osteoporosis (a T-score of -2 5 or less)   Severe osteoporosis (a T-score of -2 5 or less with a fragility fracture)             Workstation performed: [id number]     Signed by:   Regan Conn MD   9/21/17

## 2018-01-14 VITALS
SYSTOLIC BLOOD PRESSURE: 140 MMHG | HEART RATE: 90 BPM | DIASTOLIC BLOOD PRESSURE: 88 MMHG | BODY MASS INDEX: 21.6 KG/M2 | WEIGHT: 110 LBS | HEIGHT: 60 IN

## 2018-01-14 VITALS
DIASTOLIC BLOOD PRESSURE: 80 MMHG | BODY MASS INDEX: 20.3 KG/M2 | HEIGHT: 61 IN | WEIGHT: 107.5 LBS | OXYGEN SATURATION: 98 % | RESPIRATION RATE: 78 BRPM | SYSTOLIC BLOOD PRESSURE: 126 MMHG

## 2018-01-16 NOTE — RESULT NOTES
Verified Results  * XR SPINE LUMBAR MINIMUM 4 VIEWS NON INJURY [id number] 07:55AM Mary Carmen Bocanegra Order Number: [id number]     Test Name Result Flag Reference   XR SPINE LUMBAR MINIMUM 4 VIEWS (Report)     LUMBAR SPINE     INDICATION: Abnormal gait and mobility  COMPARISON: None     VIEWS: AP, lateral, bilateral oblique and coned down projections     IMAGES: 5     FINDINGS:     A mid lumbar scoliosis is present  There is 3 mm of anterolisthesis of L4 on L5  Vertebral alignment is otherwise unremarkable  There is diffuse demineralization  There is no evidence of fracture or dislocation  The disc spaces are normal in height  Degenerative facet disease is present in the lower lumbar spine  Aortic calcification is present  IMPRESSION:     Degenerative changes in the lower lumbar spine  No evidence of acute bony abnormality in the lumbar spine         Workstation performed: [id number]     Signed by:   Jenny Meléndez MD   8/22/17

## 2018-01-22 VITALS
HEIGHT: 60 IN | WEIGHT: 111 LBS | OXYGEN SATURATION: 94 % | SYSTOLIC BLOOD PRESSURE: 136 MMHG | TEMPERATURE: 98.4 F | HEART RATE: 64 BPM | BODY MASS INDEX: 21.79 KG/M2 | DIASTOLIC BLOOD PRESSURE: 92 MMHG

## 2018-01-23 VITALS
DIASTOLIC BLOOD PRESSURE: 88 MMHG | HEIGHT: 61 IN | SYSTOLIC BLOOD PRESSURE: 120 MMHG | BODY MASS INDEX: 22.09 KG/M2 | HEART RATE: 72 BPM | WEIGHT: 117 LBS

## 2018-04-05 DIAGNOSIS — E03.9 HYPOTHYROIDISM: ICD-10-CM

## 2018-04-05 DIAGNOSIS — R53.83 OTHER FATIGUE: ICD-10-CM

## 2018-04-26 ENCOUNTER — TELEPHONE (OUTPATIENT)
Dept: NEUROLOGY | Facility: CLINIC | Age: 83
End: 2018-04-26

## 2018-04-26 NOTE — TELEPHONE ENCOUNTER
Dr Luis West we call patient to schedule 6M follow up as you requested  However patient told me she does not need to come back for a follow up because she is felling better

## 2018-06-08 ENCOUNTER — APPOINTMENT (OUTPATIENT)
Dept: LAB | Facility: HOSPITAL | Age: 83
End: 2018-06-08
Payer: COMMERCIAL

## 2018-06-08 DIAGNOSIS — E03.9 HYPOTHYROIDISM: ICD-10-CM

## 2018-06-08 DIAGNOSIS — R53.83 OTHER FATIGUE: ICD-10-CM

## 2018-06-08 LAB
ALBUMIN SERPL BCP-MCNC: 3.6 G/DL (ref 3.5–5)
ALP SERPL-CCNC: 87 U/L (ref 46–116)
ALT SERPL W P-5'-P-CCNC: 15 U/L (ref 12–78)
ANION GAP SERPL CALCULATED.3IONS-SCNC: 5 MMOL/L (ref 4–13)
AST SERPL W P-5'-P-CCNC: 17 U/L (ref 5–45)
BASOPHILS # BLD AUTO: 0.05 THOUSANDS/ΜL (ref 0–0.1)
BASOPHILS NFR BLD AUTO: 1 % (ref 0–1)
BILIRUB SERPL-MCNC: 0.4 MG/DL (ref 0.2–1)
BUN SERPL-MCNC: 20 MG/DL (ref 5–25)
CALCIUM SERPL-MCNC: 9 MG/DL (ref 8.3–10.1)
CHLORIDE SERPL-SCNC: 103 MMOL/L (ref 100–108)
CO2 SERPL-SCNC: 31 MMOL/L (ref 21–32)
CREAT SERPL-MCNC: 0.67 MG/DL (ref 0.6–1.3)
EOSINOPHIL # BLD AUTO: 0.19 THOUSAND/ΜL (ref 0–0.61)
EOSINOPHIL NFR BLD AUTO: 3 % (ref 0–6)
ERYTHROCYTE [DISTWIDTH] IN BLOOD BY AUTOMATED COUNT: 13.2 % (ref 11.6–15.1)
GFR SERPL CREATININE-BSD FRML MDRD: 81 ML/MIN/1.73SQ M
GLUCOSE SERPL-MCNC: 79 MG/DL (ref 65–140)
HCT VFR BLD AUTO: 36.9 % (ref 34.8–46.1)
HGB BLD-MCNC: 11.8 G/DL (ref 11.5–15.4)
IMM GRANULOCYTES # BLD AUTO: 0.02 THOUSAND/UL (ref 0–0.2)
IMM GRANULOCYTES NFR BLD AUTO: 0 % (ref 0–2)
LYMPHOCYTES # BLD AUTO: 2.51 THOUSANDS/ΜL (ref 0.6–4.47)
LYMPHOCYTES NFR BLD AUTO: 35 % (ref 14–44)
MCH RBC QN AUTO: 29.6 PG (ref 26.8–34.3)
MCHC RBC AUTO-ENTMCNC: 32 G/DL (ref 31.4–37.4)
MCV RBC AUTO: 93 FL (ref 82–98)
MONOCYTES # BLD AUTO: 0.64 THOUSAND/ΜL (ref 0.17–1.22)
MONOCYTES NFR BLD AUTO: 9 % (ref 4–12)
NEUTROPHILS # BLD AUTO: 3.74 THOUSANDS/ΜL (ref 1.85–7.62)
NEUTS SEG NFR BLD AUTO: 52 % (ref 43–75)
NRBC BLD AUTO-RTO: 0 /100 WBCS
PLATELET # BLD AUTO: 226 THOUSANDS/UL (ref 149–390)
PMV BLD AUTO: 11.4 FL (ref 8.9–12.7)
POTASSIUM SERPL-SCNC: 4.5 MMOL/L (ref 3.5–5.3)
PROT SERPL-MCNC: 7.3 G/DL (ref 6.4–8.2)
RBC # BLD AUTO: 3.99 MILLION/UL (ref 3.81–5.12)
SODIUM SERPL-SCNC: 139 MMOL/L (ref 136–145)
T4 FREE SERPL-MCNC: 0.9 NG/DL (ref 0.76–1.46)
TSH SERPL DL<=0.05 MIU/L-ACNC: 3.36 UIU/ML (ref 0.36–3.74)
WBC # BLD AUTO: 7.15 THOUSAND/UL (ref 4.31–10.16)

## 2018-06-08 PROCEDURE — 36415 COLL VENOUS BLD VENIPUNCTURE: CPT

## 2018-06-08 PROCEDURE — 84443 ASSAY THYROID STIM HORMONE: CPT

## 2018-06-08 PROCEDURE — 85025 COMPLETE CBC W/AUTO DIFF WBC: CPT

## 2018-06-08 PROCEDURE — 80053 COMPREHEN METABOLIC PANEL: CPT

## 2018-06-08 PROCEDURE — 84439 ASSAY OF FREE THYROXINE: CPT

## 2018-06-15 ENCOUNTER — OFFICE VISIT (OUTPATIENT)
Dept: INTERNAL MEDICINE CLINIC | Facility: CLINIC | Age: 83
End: 2018-06-15
Payer: COMMERCIAL

## 2018-06-15 VITALS
WEIGHT: 112 LBS | BODY MASS INDEX: 20.61 KG/M2 | OXYGEN SATURATION: 96 % | HEART RATE: 88 BPM | HEIGHT: 62 IN | SYSTOLIC BLOOD PRESSURE: 136 MMHG | RESPIRATION RATE: 20 BRPM | TEMPERATURE: 98.1 F | DIASTOLIC BLOOD PRESSURE: 84 MMHG

## 2018-06-15 DIAGNOSIS — L23.7 POISON IVY DERMATITIS: ICD-10-CM

## 2018-06-15 DIAGNOSIS — F51.04 CHRONIC INSOMNIA: ICD-10-CM

## 2018-06-15 DIAGNOSIS — E78.5 HYPERLIPIDEMIA, UNSPECIFIED HYPERLIPIDEMIA TYPE: ICD-10-CM

## 2018-06-15 DIAGNOSIS — J30.9 ALLERGIC RHINITIS, UNSPECIFIED SEASONALITY, UNSPECIFIED TRIGGER: Primary | ICD-10-CM

## 2018-06-15 PROBLEM — G45.9 TIA (TRANSIENT ISCHEMIC ATTACK): Status: ACTIVE | Noted: 2017-11-02

## 2018-06-15 PROBLEM — R09.89 LEFT CAROTID BRUIT: Status: ACTIVE | Noted: 2018-06-15

## 2018-06-15 PROBLEM — E03.9 HYPOTHYROIDISM, ADULT: Status: ACTIVE | Noted: 2017-08-14

## 2018-06-15 PROCEDURE — 1101F PT FALLS ASSESS-DOCD LE1/YR: CPT | Performed by: PHYSICIAN ASSISTANT

## 2018-06-15 PROCEDURE — 3725F SCREEN DEPRESSION PERFORMED: CPT | Performed by: PHYSICIAN ASSISTANT

## 2018-06-15 PROCEDURE — 99214 OFFICE O/P EST MOD 30 MIN: CPT | Performed by: PHYSICIAN ASSISTANT

## 2018-06-15 RX ORDER — TRIAMCINOLONE ACETONIDE 1 MG/G
CREAM TOPICAL 2 TIMES DAILY
Qty: 30 G | Refills: 0 | Status: SHIPPED | OUTPATIENT
Start: 2018-06-15 | End: 2018-09-14 | Stop reason: ALTCHOICE

## 2018-06-15 NOTE — PROGRESS NOTES
Assessment/Plan:   Patient Instructions   Advised trial of Claritin 10 mg daily through middle of June during her allergy season  This is for the chronic postnasal drip  Will make triamcinolone cream available for poison ivy  Schedule follow-up 6 months with repeat labs  Return in about 6 months (around 12/15/2018) for Next scheduled follow up-Pt preference  Diagnoses and all orders for this visit:    Allergic rhinitis, unspecified seasonality, unspecified trigger    Hyperlipidemia, unspecified hyperlipidemia type  -     Comprehensive metabolic panel; Future  -     Lipid panel; Future    Poison ivy dermatitis  -     triamcinolone (KENALOG) 0 1 % cream; Apply topically 2 (two) times a day    Chronic insomnia          Subjective:      Patient ID: Daryn Brumfield is a 80 y o  female  Follow-up    Patient generally feels well  Two main complaints 1 of chronic insomnia, 2nd is a request for a steroid cream due to poison ivy  We again discussed chronic insomnia, I did give her a handout on sleep hygiene for her to try to practice  I talked about not wanting her to be on controlled medication for sleep  Labs reviewed with patient, all look in good control  Hyperlipidemia:  Very high HDL  She has read articles relating to Women not being on statin medication  She does have elevated total cholesterol and LDL but does not want to start statin medication  Chronic postnasal drip:  This is most likely from allergies  It starts when she gets back from [place] in May and usually goes to mid June  Lately she has noted that it has been more chronic throughout the year  She was advised to try Claritin 10 mg daily to see if this provides any relief          ALLERGIES:  Allergies   Allergen Reactions    Latex     Molds & Smuts     Penicillins        CURRENT MEDICATIONS:    Current Outpatient Prescriptions:     triamcinolone (KENALOG) 0 1 % cream, Apply topically 2 (two) times a day, Disp: 30 g, Rfl: 0    ACTIVE PROBLEM LIST:  Patient Active Problem List   Diagnosis    Allergic rhinitis    Chronic insomnia    Hypothyroidism, adult    TIA (transient ischemic attack)    Left carotid bruit    Poison ivy dermatitis    Hyperlipidemia       PAST MEDICAL HISTORY:  No past medical history on file  PAST SURGICAL HISTORY:  Past Surgical History:   Procedure Laterality Date    FOOT SURGERY  2007       FAMILY HISTORY:  Family History   Problem Relation Age of Onset   [facility] Parkinsonism Mother     Diabetes Father     Hyperlipidemia Father     Hypertension Father     Other Father         cardiac Disorder       SOCIAL HISTORY:  Social History     Social History    Marital status: /Civil Union     Spouse name: N/A    Number of children: 2    Years of education: N/A     Occupational History    Retired      Social History Main Topics    Smoking status: Former Smoker    Smokeless tobacco: Never Used      Comment: Quit smoking at 21years of age   [facility] Alcohol use Yes      Comment: occasional    Drug use: No    Sexual activity: No     Other Topics Concern    Not on file     Social History Narrative    Caffeine use    Graduated from high school    Lives with adult children    Lives with     Denied: History of High risk sexual behavior           Review of Systems   Constitutional: Negative for activity change, chills, fatigue and fever  HENT: Positive for congestion and postnasal drip  Negative for rhinorrhea, sinus pain, sinus pressure, sneezing and sore throat  Eyes: Negative for discharge  Respiratory: Negative for cough, chest tightness and shortness of breath  Cardiovascular: Negative for chest pain, palpitations and leg swelling  Gastrointestinal: Negative for abdominal pain  Genitourinary: Negative for difficulty urinating  Musculoskeletal: Negative for arthralgias and myalgias  Skin: Negative for rash  Allergic/Immunologic: Negative for immunocompromised state  Neurological: Negative for dizziness, syncope, weakness, light-headedness and headaches  Hematological: Negative for adenopathy  Does not bruise/bleed easily  Psychiatric/Behavioral: Negative for dysphoric mood  The patient is not nervous/anxious  Objective:  Vitals:    06/15/18 0818   BP: 136/84   BP Location: Left arm   Patient Position: Sitting   Cuff Size: Adult   Pulse: 88   Resp: 20   Temp: 98 1 °F (36 7 °C)   TempSrc: Temporal   SpO2: 96%   Weight: 50 8 kg (112 lb)   Height: 5' 2" (1 575 m)        Physical Exam   Constitutional: She is oriented to person, place, and time  She appears well-developed and well-nourished  No distress  HENT:   Mouth/Throat: No oropharyngeal exudate  Mild enlargement of inferior turbinates, mucous membranes are boggy  Conjunctiva are granular  Neck: Neck supple  Carotid bruit is present (Left carotid bruit present, carotid ultrasound present in the EMR dated 11/17 showing no significant stenosis)  No thyromegaly present  Cardiovascular: Normal rate, regular rhythm and normal heart sounds  Pulmonary/Chest: Effort normal and breath sounds normal    Abdominal: Soft  There is no tenderness  Musculoskeletal: She exhibits no edema  Lymphadenopathy:     She has no cervical adenopathy  Neurological: She is alert and oriented to person, place, and time  Skin: Skin is warm and dry  No rash noted  Psychiatric: She has a normal mood and affect  Her behavior is normal    Nursing note and vitals reviewed          RESULTS:    Recent Results (from the past 1008 hour(s))   TSH, 3rd generation    Collection Time: 06/08/18  9:18 AM   Result Value Ref Range    TSH 3RD GENERATON 3 358 0 358 - 3 740 uIU/mL   T4, free    Collection Time: 06/08/18  9:18 AM   Result Value Ref Range    Free T4 0 90 0 76 - 1 46 ng/dL   Comprehensive metabolic panel    Collection Time: 06/08/18  9:18 AM   Result Value Ref Range    Sodium 139 136 - 145 mmol/L    Potassium 4 5 3 5 - 5 3 mmol/L    Chloride 103 100 - 108 mmol/L    CO2 31 21 - 32 mmol/L    Anion Gap 5 4 - 13 mmol/L    BUN 20 5 - 25 mg/dL    Creatinine 0 67 0 60 - 1 30 mg/dL    Glucose 79 65 - 140 mg/dL    Calcium 9 0 8 3 - 10 1 mg/dL    AST 17 5 - 45 U/L    ALT 15 12 - 78 U/L    Alkaline Phosphatase 87 46 - 116 U/L    Total Protein 7 3 6 4 - 8 2 g/dL    Albumin 3 6 3 5 - 5 0 g/dL    Total Bilirubin 0 40 0 20 - 1 00 mg/dL    eGFR 81 ml/min/1 73sq m   CBC and differential    Collection Time: 06/08/18  9:18 AM   Result Value Ref Range    WBC 7 15 4 31 - 10 16 Thousand/uL    RBC 3 99 3 81 - 5 12 Million/uL    Hemoglobin 11 8 11 5 - 15 4 g/dL    Hematocrit 36 9 34 8 - 46 1 %    MCV 93 82 - 98 fL    MCH 29 6 26 8 - 34 3 pg    MCHC 32 0 31 4 - 37 4 g/dL    RDW 13 2 11 6 - 15 1 %    MPV 11 4 8 9 - 12 7 fL    Platelets 335 854 - 283 Thousands/uL    nRBC 0 /100 WBCs    Neutrophils Relative 52 43 - 75 %    Immat GRANS % 0 0 - 2 %    Lymphocytes Relative 35 14 - 44 %    Monocytes Relative 9 4 - 12 %    Eosinophils Relative 3 0 - 6 %    Basophils Relative 1 0 - 1 %    Neutrophils Absolute 3 74 1 85 - 7 62 Thousands/µL    Immature Grans Absolute 0 02 0 00 - 0 20 Thousand/uL    Lymphocytes Absolute 2 51 0 60 - 4 47 Thousands/µL    Monocytes Absolute 0 64 0 17 - 1 22 Thousand/µL    Eosinophils Absolute 0 19 0 00 - 0 61 Thousand/µL    Basophils Absolute 0 05 0 00 - 0 10 Thousands/µL       This note was created with voice recognition software  Phonic, grammatical and spelling errors may be present within the note as a result

## 2018-06-15 NOTE — PATIENT INSTRUCTIONS
Advised trial of Claritin 10 mg daily through middle of June during her allergy season  This is for the chronic postnasal drip  Will make triamcinolone cream available for poison ivy  Schedule follow-up 6 months with repeat labs

## 2018-09-14 ENCOUNTER — OFFICE VISIT (OUTPATIENT)
Dept: INTERNAL MEDICINE CLINIC | Facility: CLINIC | Age: 83
End: 2018-09-14
Payer: COMMERCIAL

## 2018-09-14 VITALS
HEIGHT: 62 IN | DIASTOLIC BLOOD PRESSURE: 88 MMHG | HEART RATE: 87 BPM | BODY MASS INDEX: 20.8 KG/M2 | OXYGEN SATURATION: 97 % | SYSTOLIC BLOOD PRESSURE: 138 MMHG | WEIGHT: 113 LBS | TEMPERATURE: 97.5 F | RESPIRATION RATE: 16 BRPM

## 2018-09-14 DIAGNOSIS — L85.3 DRY SKIN DERMATITIS: Primary | ICD-10-CM

## 2018-09-14 PROBLEM — L23.7 POISON IVY DERMATITIS: Status: RESOLVED | Noted: 2018-06-15 | Resolved: 2018-09-14

## 2018-09-14 PROCEDURE — 3008F BODY MASS INDEX DOCD: CPT | Performed by: PHYSICIAN ASSISTANT

## 2018-09-14 PROCEDURE — 99213 OFFICE O/P EST LOW 20 MIN: CPT | Performed by: PHYSICIAN ASSISTANT

## 2018-09-14 PROCEDURE — 1160F RVW MEDS BY RX/DR IN RCRD: CPT | Performed by: PHYSICIAN ASSISTANT

## 2018-09-14 RX ORDER — TRIAMCINOLONE ACETONIDE 1 MG/G
CREAM TOPICAL 3 TIMES DAILY
Qty: 30 G | Refills: 1 | Status: SHIPPED | OUTPATIENT
Start: 2018-09-14 | End: 2018-09-14 | Stop reason: SDUPTHER

## 2018-09-14 RX ORDER — TRIAMCINOLONE ACETONIDE 1 MG/G
CREAM TOPICAL 3 TIMES DAILY
Qty: 30 G | Refills: 1
Start: 2018-09-14 | End: 2018-10-11 | Stop reason: ALTCHOICE

## 2018-09-14 NOTE — PROGRESS NOTES
Assessment/Plan:          Return in about 2 weeks (around 9/28/2018) for Recheck  Diagnoses and all orders for this visit:    Dry skin dermatitis  -     triamcinolone (KENALOG) 0 1 % cream; Apply topically 3 (three) times a day    Other orders  -     Discontinue: triamcinolone (KENALOG) 0 1 % cream; Apply topically 3 (three) times a day          Subjective:      Patient ID: Betsy Osuna is a 80 y o  female  HPI    ALLERGIES:  Allergies   Allergen Reactions    Latex     Molds & Smuts     Penicillins        CURRENT MEDICATIONS:    Current Outpatient Prescriptions:     triamcinolone (KENALOG) 0 1 % cream, Apply topically 3 (three) times a day, Disp: 30 g, Rfl: 1    ACTIVE PROBLEM LIST:  Patient Active Problem List   Diagnosis    Allergic rhinitis    Chronic insomnia    Hypothyroidism, adult    TIA (transient ischemic attack)    Left carotid bruit    Hyperlipidemia    Dry skin dermatitis       PAST MEDICAL HISTORY:  No past medical history on file      PAST SURGICAL HISTORY:  Past Surgical History:   Procedure Laterality Date    FOOT SURGERY  2007       FAMILY HISTORY:  Family History   Problem Relation Age of Onset   [facility]hel[facility] Catalino Parkinsonism Mother     Diabetes Father     Hyperlipidemia Father     Hypertension Father     Other Father         cardiac Disorder       SOCIAL HISTORY:  Social History     Social History    Marital status: /Civil Union     Spouse name: N/A    Number of children: 2    Years of education: N/A     Occupational History    Retired      Social History Main Topics    Smoking status: Former Smoker    Smokeless tobacco: Never Used      Comment: Quit smoking at 21years of age   [facility] Bae Alcohol use Yes      Comment: occasional    Drug use: No    Sexual activity: No     Other Topics Concern    Not on file     Social History Narrative    Caffeine use    Graduated from high school    Lives with adult children    Lives with     Denied: History of High risk sexual behavior           Review of Systems   Constitutional: Negative for activity change, chills, fatigue and fever  HENT: Negative for congestion  Eyes: Negative for discharge  Respiratory: Negative for cough, chest tightness and shortness of breath  Cardiovascular: Negative for chest pain, palpitations and leg swelling  Gastrointestinal: Negative for abdominal pain  Genitourinary: Negative for difficulty urinating  Musculoskeletal: Negative for arthralgias and myalgias  Skin: Positive for rash  Allergic/Immunologic: Negative for immunocompromised state  Neurological: Negative for dizziness, syncope, weakness, light-headedness and headaches  Hematological: Negative for adenopathy  Does not bruise/bleed easily  Psychiatric/Behavioral: Positive for sleep disturbance  Negative for dysphoric mood  The patient is not nervous/anxious  Objective:  Vitals:    09/14/18 1127   BP: 138/88   BP Location: Right arm   Patient Position: Sitting   Cuff Size: Adult   Pulse: 87   Resp: 16   Temp: 97 5 °F (36 4 °C)   TempSrc: Temporal   SpO2: 97%   Weight: 51 3 kg (113 lb)   Height: 5' 2" (1 575 m)        Physical Exam   Constitutional: She is oriented to person, place, and time  She appears well-developed and well-nourished  No distress  Neck: Neck supple  Cardiovascular: Normal rate, regular rhythm and normal heart sounds  Pulmonary/Chest: Effort normal and breath sounds normal    Musculoskeletal: She exhibits no edema  Lymphadenopathy:     She has no cervical adenopathy  Neurological: She is alert and oriented to person, place, and time  Skin: Skin is warm and dry  Rash (Dry scaly skin of pretibial areas bilaterally, slight pinkness at the area, no secondary signs of infection) noted  Psychiatric: She has a normal mood and affect  Her behavior is normal    Nursing note and vitals reviewed          RESULTS:    No results found for this or any previous visit (from the past 1008 hour(s))  This note was created with voice recognition software  Phonic, grammatical and spelling errors may be present within the note as a result

## 2018-09-14 NOTE — PATIENT INSTRUCTIONS
Will give a trial of topical triamcinolone cream to dry skin areas that are itchy on legs  Will recheck in 2 weeks and also  Assess elevated blood pressure

## 2018-09-20 ENCOUNTER — APPOINTMENT (OUTPATIENT)
Dept: LAB | Facility: HOSPITAL | Age: 83
End: 2018-09-20
Payer: COMMERCIAL

## 2018-09-20 DIAGNOSIS — E78.5 HYPERLIPIDEMIA, UNSPECIFIED HYPERLIPIDEMIA TYPE: ICD-10-CM

## 2018-09-20 LAB
ALBUMIN SERPL BCP-MCNC: 3.6 G/DL (ref 3.5–5)
ALP SERPL-CCNC: 68 U/L (ref 46–116)
ALT SERPL W P-5'-P-CCNC: 19 U/L (ref 12–78)
ANION GAP SERPL CALCULATED.3IONS-SCNC: 8 MMOL/L (ref 4–13)
AST SERPL W P-5'-P-CCNC: 16 U/L (ref 5–45)
BILIRUB SERPL-MCNC: 0.4 MG/DL (ref 0.2–1)
BUN SERPL-MCNC: 20 MG/DL (ref 5–25)
CALCIUM SERPL-MCNC: 8.8 MG/DL (ref 8.3–10.1)
CHLORIDE SERPL-SCNC: 105 MMOL/L (ref 100–108)
CHOLEST SERPL-MCNC: 330 MG/DL (ref 50–200)
CO2 SERPL-SCNC: 29 MMOL/L (ref 21–32)
CREAT SERPL-MCNC: 0.65 MG/DL (ref 0.6–1.3)
GFR SERPL CREATININE-BSD FRML MDRD: 82 ML/MIN/1.73SQ M
GLUCOSE P FAST SERPL-MCNC: 82 MG/DL (ref 65–99)
HDLC SERPL-MCNC: 95 MG/DL (ref 40–60)
LDLC SERPL CALC-MCNC: 223 MG/DL (ref 0–100)
NONHDLC SERPL-MCNC: 235 MG/DL
POTASSIUM SERPL-SCNC: 4.1 MMOL/L (ref 3.5–5.3)
PROT SERPL-MCNC: 7 G/DL (ref 6.4–8.2)
SODIUM SERPL-SCNC: 142 MMOL/L (ref 136–145)
TRIGL SERPL-MCNC: 61 MG/DL

## 2018-09-20 PROCEDURE — 80053 COMPREHEN METABOLIC PANEL: CPT

## 2018-09-20 PROCEDURE — 80061 LIPID PANEL: CPT

## 2018-09-20 PROCEDURE — 36415 COLL VENOUS BLD VENIPUNCTURE: CPT

## 2018-10-11 ENCOUNTER — IMMUNIZATION (OUTPATIENT)
Dept: INTERNAL MEDICINE CLINIC | Facility: CLINIC | Age: 83
End: 2018-10-11
Payer: COMMERCIAL

## 2018-10-11 ENCOUNTER — TELEPHONE (OUTPATIENT)
Dept: INTERNAL MEDICINE CLINIC | Facility: CLINIC | Age: 83
End: 2018-10-11

## 2018-10-11 DIAGNOSIS — Z23 NEED FOR VACCINATION: Primary | ICD-10-CM

## 2018-10-11 DIAGNOSIS — Z23 ENCOUNTER FOR IMMUNIZATION: ICD-10-CM

## 2018-10-11 DIAGNOSIS — L85.3 DRY SKIN DERMATITIS: Primary | ICD-10-CM

## 2018-10-11 PROCEDURE — 90662 IIV NO PRSV INCREASED AG IM: CPT

## 2018-10-11 PROCEDURE — 4040F PNEUMOC VAC/ADMIN/RCVD: CPT

## 2018-10-11 PROCEDURE — G0008 ADMIN INFLUENZA VIRUS VAC: HCPCS

## 2018-10-11 RX ORDER — TRIAMCINOLONE ACETONIDE 5 MG/G
CREAM TOPICAL 3 TIMES DAILY
Qty: 30 G | Refills: 2 | Status: SHIPPED | OUTPATIENT
Start: 2018-10-11 | End: 2020-01-09 | Stop reason: ALTCHOICE

## 2018-10-11 NOTE — TELEPHONE ENCOUNTER
Patient came in today stating the triamcinolone cream helped alittle bit but she is still itchy and has dry patches  Is there anything else she can try?

## 2018-10-11 NOTE — TELEPHONE ENCOUNTER
This is low dose triamcinolone  Lets first try the stronger dose since she did get some relief  I sent it to her pharmacy

## 2019-05-23 ENCOUNTER — APPOINTMENT (OUTPATIENT)
Dept: LAB | Facility: HOSPITAL | Age: 84
End: 2019-05-23
Payer: COMMERCIAL

## 2019-05-23 ENCOUNTER — HOSPITAL ENCOUNTER (OUTPATIENT)
Dept: RADIOLOGY | Facility: HOSPITAL | Age: 84
Discharge: HOME | End: 2019-05-23
Payer: COMMERCIAL

## 2019-05-23 ENCOUNTER — OFFICE VISIT (OUTPATIENT)
Dept: INTERNAL MEDICINE CLINIC | Facility: CLINIC | Age: 84
End: 2019-05-23
Payer: COMMERCIAL

## 2019-05-23 VITALS
HEIGHT: 62 IN | OXYGEN SATURATION: 93 % | SYSTOLIC BLOOD PRESSURE: 140 MMHG | HEART RATE: 80 BPM | RESPIRATION RATE: 18 BRPM | WEIGHT: 108 LBS | BODY MASS INDEX: 19.88 KG/M2 | DIASTOLIC BLOOD PRESSURE: 78 MMHG

## 2019-05-23 DIAGNOSIS — J18.9 PNEUMONIA DUE TO INFECTIOUS ORGANISM, UNSPECIFIED LATERALITY, UNSPECIFIED PART OF LUNG: ICD-10-CM

## 2019-05-23 DIAGNOSIS — J18.9 PNEUMONIA DUE TO INFECTIOUS ORGANISM, UNSPECIFIED LATERALITY, UNSPECIFIED PART OF LUNG: Primary | ICD-10-CM

## 2019-05-23 LAB
ALBUMIN SERPL BCP-MCNC: 3.1 G/DL (ref 3.5–5)
ALP SERPL-CCNC: 113 U/L (ref 46–116)
ALT SERPL W P-5'-P-CCNC: 18 U/L (ref 12–78)
ANION GAP SERPL CALCULATED.3IONS-SCNC: 9 MMOL/L (ref 4–13)
AST SERPL W P-5'-P-CCNC: 15 U/L (ref 5–45)
BASOPHILS # BLD AUTO: 0.1 THOUSANDS/ΜL (ref 0–0.1)
BASOPHILS NFR BLD AUTO: 1 % (ref 0–1)
BILIRUB SERPL-MCNC: 0.2 MG/DL (ref 0.2–1)
BUN SERPL-MCNC: 20 MG/DL (ref 5–25)
CALCIUM SERPL-MCNC: 8.8 MG/DL (ref 8.3–10.1)
CHLORIDE SERPL-SCNC: 106 MMOL/L (ref 100–108)
CO2 SERPL-SCNC: 28 MMOL/L (ref 21–32)
CREAT SERPL-MCNC: 0.71 MG/DL (ref 0.6–1.3)
EOSINOPHIL # BLD AUTO: 0.15 THOUSAND/ΜL (ref 0–0.61)
EOSINOPHIL NFR BLD AUTO: 2 % (ref 0–6)
ERYTHROCYTE [DISTWIDTH] IN BLOOD BY AUTOMATED COUNT: 14.6 % (ref 11.6–15.1)
GFR SERPL CREATININE-BSD FRML MDRD: 78 ML/MIN/1.73SQ M
GLUCOSE SERPL-MCNC: 110 MG/DL (ref 65–140)
HCT VFR BLD AUTO: 36 % (ref 34.8–46.1)
HGB BLD-MCNC: 11 G/DL (ref 11.5–15.4)
IMM GRANULOCYTES # BLD AUTO: 0.05 THOUSAND/UL (ref 0–0.2)
IMM GRANULOCYTES NFR BLD AUTO: 1 % (ref 0–2)
LYMPHOCYTES # BLD AUTO: 2.57 THOUSANDS/ΜL (ref 0.6–4.47)
LYMPHOCYTES NFR BLD AUTO: 30 % (ref 14–44)
MCH RBC QN AUTO: 29.2 PG (ref 26.8–34.3)
MCHC RBC AUTO-ENTMCNC: 30.6 G/DL (ref 31.4–37.4)
MCV RBC AUTO: 96 FL (ref 82–98)
MONOCYTES # BLD AUTO: 0.83 THOUSAND/ΜL (ref 0.17–1.22)
MONOCYTES NFR BLD AUTO: 10 % (ref 4–12)
NEUTROPHILS # BLD AUTO: 4.89 THOUSANDS/ΜL (ref 1.85–7.62)
NEUTS SEG NFR BLD AUTO: 56 % (ref 43–75)
NRBC BLD AUTO-RTO: 0 /100 WBCS
PLATELET # BLD AUTO: 421 THOUSANDS/UL (ref 149–390)
PMV BLD AUTO: 11.8 FL (ref 8.9–12.7)
POTASSIUM SERPL-SCNC: 3.9 MMOL/L (ref 3.5–5.3)
PROT SERPL-MCNC: 7.3 G/DL (ref 6.4–8.2)
RBC # BLD AUTO: 3.77 MILLION/UL (ref 3.81–5.12)
SODIUM SERPL-SCNC: 143 MMOL/L (ref 136–145)
WBC # BLD AUTO: 8.59 THOUSAND/UL (ref 4.31–10.16)

## 2019-05-23 PROCEDURE — 1160F RVW MEDS BY RX/DR IN RCRD: CPT | Performed by: PHYSICIAN ASSISTANT

## 2019-05-23 PROCEDURE — 80053 COMPREHEN METABOLIC PANEL: CPT

## 2019-05-23 PROCEDURE — 36415 COLL VENOUS BLD VENIPUNCTURE: CPT

## 2019-05-23 PROCEDURE — 1101F PT FALLS ASSESS-DOCD LE1/YR: CPT | Performed by: PHYSICIAN ASSISTANT

## 2019-05-23 PROCEDURE — 3725F SCREEN DEPRESSION PERFORMED: CPT | Performed by: PHYSICIAN ASSISTANT

## 2019-05-23 PROCEDURE — 85025 COMPLETE CBC W/AUTO DIFF WBC: CPT

## 2019-05-23 PROCEDURE — 71046 X-RAY EXAM CHEST 2 VIEWS: CPT

## 2019-05-23 PROCEDURE — 99213 OFFICE O/P EST LOW 20 MIN: CPT | Performed by: PHYSICIAN ASSISTANT

## 2019-08-05 ENCOUNTER — OFFICE VISIT (OUTPATIENT)
Dept: INTERNAL MEDICINE CLINIC | Facility: CLINIC | Age: 84
End: 2019-08-05
Payer: COMMERCIAL

## 2019-08-05 VITALS
SYSTOLIC BLOOD PRESSURE: 124 MMHG | DIASTOLIC BLOOD PRESSURE: 70 MMHG | RESPIRATION RATE: 18 BRPM | HEIGHT: 62 IN | BODY MASS INDEX: 20.06 KG/M2 | WEIGHT: 109 LBS | OXYGEN SATURATION: 96 % | HEART RATE: 85 BPM

## 2019-08-05 DIAGNOSIS — E78.5 HYPERLIPIDEMIA, UNSPECIFIED HYPERLIPIDEMIA TYPE: ICD-10-CM

## 2019-08-05 DIAGNOSIS — Z23 NEED FOR PNEUMOCOCCAL VACCINATION: ICD-10-CM

## 2019-08-05 DIAGNOSIS — E03.9 HYPOTHYROIDISM, ADULT: ICD-10-CM

## 2019-08-05 DIAGNOSIS — M79.10 MYALGIA: ICD-10-CM

## 2019-08-05 DIAGNOSIS — Z00.00 ENCOUNTER FOR MEDICARE ANNUAL WELLNESS EXAM: Primary | ICD-10-CM

## 2019-08-05 PROBLEM — M81.0 OSTEOPOROSIS: Status: ACTIVE | Noted: 2019-08-05

## 2019-08-05 PROCEDURE — 1170F FXNL STATUS ASSESSED: CPT | Performed by: PHYSICIAN ASSISTANT

## 2019-08-05 PROCEDURE — 90732 PPSV23 VACC 2 YRS+ SUBQ/IM: CPT | Performed by: PHYSICIAN ASSISTANT

## 2019-08-05 PROCEDURE — 4040F PNEUMOC VAC/ADMIN/RCVD: CPT | Performed by: PHYSICIAN ASSISTANT

## 2019-08-05 PROCEDURE — G0439 PPPS, SUBSEQ VISIT: HCPCS | Performed by: PHYSICIAN ASSISTANT

## 2019-08-05 PROCEDURE — G0009 ADMIN PNEUMOCOCCAL VACCINE: HCPCS | Performed by: PHYSICIAN ASSISTANT

## 2019-08-05 PROCEDURE — 99214 OFFICE O/P EST MOD 30 MIN: CPT | Performed by: PHYSICIAN ASSISTANT

## 2019-08-05 PROCEDURE — 1125F AMNT PAIN NOTED PAIN PRSNT: CPT | Performed by: PHYSICIAN ASSISTANT

## 2019-08-05 PROCEDURE — 1160F RVW MEDS BY RX/DR IN RCRD: CPT | Performed by: PHYSICIAN ASSISTANT

## 2019-08-05 NOTE — PATIENT INSTRUCTIONS
Will check patient's screening labs, and report results to her when available     Schedule follow-up when returning from [place]  Obesity   AMBULATORY CARE:   Obesity  is when your body mass index (BMI) is greater than 30  Your healthcare provider will use your height and weight to measure your BMI  The risks of obesity include  many health problems, such as injuries or physical disability  You may need tests to check for the following:  · Diabetes     · High blood pressure or high cholesterol     · Heart disease     · Gallbladder or liver disease     · Cancer of the colon, breast, prostate, liver, or kidney     · Sleep apnea     · Arthritis or gout  Seek care immediately if:   · You have a severe headache, confusion, or difficulty speaking  · You have weakness on one side of your body  · You have chest pain, sweating, or shortness of breath  Contact your healthcare provider if:   · You have symptoms of gallbladder or liver disease, such as pain in your upper abdomen  · You have knee or hip pain and discomfort while walking  · You have symptoms of diabetes, such as intense hunger and thirst, and frequent urination  · You have symptoms of sleep apnea, such as snoring or daytime sleepiness  · You have questions or concerns about your condition or care  Treatment for obesity  focuses on helping you lose weight to improve your health  Even a small decrease in BMI can reduce the risk for many health problems  Your healthcare provider will help you set a weight-loss goal   · Lifestyle changes  are the first step in treating obesity  These include making healthy food choices and getting regular physical activity  Your healthcare provider may suggest a weight-loss program that involves coaching, education, and therapy  · Medicine  may help you lose weight when it is used with a healthy diet and physical activity       · Surgery  can help you lose weight if you are very obese and have other health problems  There are several types of weight-loss surgery  Ask your healthcare provider for more information  Be successful losing weight:   · Set small, realistic goals  An example of a small goal is to walk for 20 minutes 5 days a week  Anther goal is to lose 5% of your body weight  · Tell friends, family members, and coworkers about your goals  and ask for their support  Ask a friend to lose weight with you, or join a weight-loss support group  · Identify foods or triggers that may cause you to overeat , and find ways to avoid them  Remove tempting high-calorie foods from your home and workplace  Place a bowl of fresh fruit on your kitchen counter  If stress causes you to eat, then find other ways to cope with stress  · Keep a diary to track what you eat and drink  Also write down how many minutes of physical activity you do each day  Weigh yourself once a week and record it in your diary  Eating changes: You will need to eat 500 to 1,000 fewer calories each day than you currently eat to lose 1 to 2 pounds a week  The following changes will help you cut calories:  · Eat smaller portions  Use small plates, no larger than 9 inches in diameter  Fill your plate half full of fruits and vegetables  Measure your food using measuring cups until you know what a serving size looks like  · Eat 3 meals and 1 or 2 snacks each day  Plan your meals in advance  Aguiar Comber and eat at home most of the time  Eat slowly  · Eat fruits and vegetables at every meal   They are low in calories and high in fiber, which makes you feel full  Do not add butter, margarine, or cream sauce to vegetables  Use herbs to season steamed vegetables  · Eat less fat and fewer fried foods  Eat more baked or grilled chicken and fish  These protein sources are lower in calories and fat than red meat  Limit fast food  Dress your salads with olive oil and vinegar instead of bottled dressing       · Limit the amount of sugar you eat  Do not drink sugary beverages  Limit alcohol  Activity changes:  Physical activity is good for your body in many ways  It helps you burn calories and build strong muscles  It decreases stress and depression, and improves your mood  It can also help you sleep better  Talk to your healthcare provider before you begin an exercise program   · Exercise for at least 30 minutes 5 days a week  Start slowly  Set aside time each day for physical activity that you enjoy and that is convenient for you  It is best to do both weight training and an activity that increases your heart rate, such as walking, bicycling, or swimming  · Find ways to be more active  Do yard work and housecleaning  Walk up the stairs instead of using elevators  Spend your leisure time going to events that require walking, such as outdoor festivals or fairs  This extra physical activity can help you lose weight and keep it off  Follow up with your healthcare provider as directed: You may need to meet with a dietitian  Write down your questions so you remember to ask them during your visits  © 2017 [facility] Information is for End User's use only and may not be sold, redistributed or otherwise used for commercial purposes  All illustrations and images included in CareNotes® are the copyrighted property of A D A M , Inc  or [employer]  The above information is an  only  It is not intended as medical advice for individual conditions or treatments  Talk to your doctor, nurse or pharmacist before following any medical regimen to see if it is safe and effective for you  Urinary Incontinence   WHAT YOU NEED TO KNOW:   What is urinary incontinence? Urinary incontinence (UI) is when you lose control of your bladder  What causes UI? UI occurs because your bladder cannot store or empty urine properly   The following are the most common types of UI:  · Stress incontinence  is when you leak urine due to increased bladder pressure  This may happen when you cough, sneeze, or exercise  · Urge incontinence  is when you feel the need to urinate right away and leak urine accidentally  · Mixed incontinence  is when you have both stress and urge UI  What are the signs and symptoms of UI?   · You feel like your bladder does not empty completely when you urinate  · You urinate often and need to urinate immediately  · You leak urine when you sleep, or you wake up with the urge to urinate  · You leak urine when you cough, sneeze, exercise, or laugh  How is UI diagnosed? Your healthcare provider will ask how often you leak urine and whether you have stress or urge symptoms  Tell him which medicines you take, how often you urinate, and how much liquid you drink each day  You may need any of the following tests:  · Urine tests  may show infection or kidney function  · A pelvic exam  may be done to check for blockages  A pelvic exam will also show if your bladder, uterus, or other organs have moved out of place  · An x-ray, ultrasound, or CT  may show problems with parts of your urinary system  You may be given contrast liquid to help your organs show up better in the pictures  Tell the healthcare provider if you have ever had an allergic reaction to contrast liquid  Do not enter the MRI room with anything metal  Metal can cause serious injury  Tell the healthcare provider if you have any metal in or on your body  · A bladder scan  will show how much urine is left in your bladder after you urinate  You will be asked to urinate and then healthcare providers will use a small ultrasound machine to check the urine left in your bladder  · Cystometry  is used to check the function of your urinary system  Your healthcare provider checks the pressure in your bladder while filling it with fluid  Your bladder pressure may also be tested when your bladder is full and while you urinate    How is UI treated? · Medicines  can help strengthen your bladder control  · Electrical stimulation  is used to send a small amount of electrical energy to your pelvic floor muscles  This helps control your bladder function  Electrodes may be placed outside your body or in your rectum  For women, the electrodes may be placed in the vagina  · A bulking agent  may be injected into the wall of your urethra to make it thicker  This helps keep your urethra closed and decreases urine leakage  · Devices  such as a clamp, pessary, or tampon may help stop urine leaks  Ask your healthcare provider for more information about these and other devices  · Surgery  may be needed if other treatments do not work  Several types of surgery can help improve your bladder control  Ask your healthcare provider for more information about the surgery you may need  How can I manage my symptoms? · Do pelvic muscle exercises often  Your pelvic muscles help you stop urinating  Squeeze these muscles tight for 5 seconds, then relax for 5 seconds  Gradually work up to squeezing for 10 seconds  Do 3 sets of 15 repetitions a day, or as directed  This will help strengthen your pelvic muscles and improve bladder control  · A catheter  may be used to help empty your bladder  A catheter is a tiny, plastic tube that is put into your bladder to drain your urine  Your healthcare provider may tell you to use a catheter to prevent your bladder from getting too full and leaking urine  · Keep a UI record  Write down how often you leak urine and how much you leak  Make a note of what you were doing when you leaked urine  · Train your bladder  Go to the bathroom at set times, such as every 2 hours, even if you do not feel the urge to go  You can also try to hold your urine when you feel the urge to go  For example, hold your urine for 5 minutes when you feel the urge to go  As that becomes easier, hold your urine for 10 minutes       · Drink liquids as directed  Ask your healthcare provider how much liquid to drink each day and which liquids are best for you  You may need to limit the amount of liquid you drink to help control your urine leakage  Limit or do not have drinks that contain caffeine or alcohol  Do not drink any liquid right before you go to bed  · Prevent constipation  Eat a variety of high-fiber foods  Good examples are high-fiber cereals, beans, vegetables, and whole-grain breads  Prune juice may help make your bowel movement softer  Walking is the best way to trigger your intestines to have a bowel movement  · Exercise regularly and maintain a healthy weight  Ask your healthcare provider how much you should weigh and about the best exercise plan for you  Weight loss and exercise will decrease pressure on your bladder and help you control your leakage  Ask him to help you create a weight loss plan if you are overweight  When should I seek immediate care? · You have severe pain  · You are confused or cannot think clearly  When should I contact my healthcare provider? · You have a fever  · You see blood in your urine  · You have pain when you urinate  · You have new or worse pain, even after treatment  · Your mouth feels dry or you have vision changes  · Your urine is cloudy or smells bad  · You have questions or concerns about your condition or care  CARE AGREEMENT:   You have the right to help plan your care  Learn about your health condition and how it may be treated  Discuss treatment options with your caregivers to decide what care you want to receive  You always have the right to refuse treatment  The above information is an  only  It is not intended as medical advice for individual conditions or treatments  Talk to your doctor, nurse or pharmacist before following any medical regimen to see if it is safe and effective for you    © 2017 [employer]ordin Moreno Information is for End User's use only and may not be sold, redistributed or otherwise used for commercial purposes  All illustrations and images included in CareNotes® are the copyrighted property of A D A M , Inc  or Josh Kenney  Cigarette Smoking and Your Health   AMBULATORY CARE:   Risks to your health if you smoke:  Nicotine and other chemicals found in tobacco damage every cell in your body  Even if you are a light smoker, you have an increased risk for cancer, heart disease, and lung disease  If you are pregnant or have diabetes, smoking increases your risk for complications  Benefits to your health if you stop smoking:   · You decrease respiratory symptoms such as coughing, wheezing, and shortness of breath  · You reduce your risk for cancers of the lung, mouth, throat, kidney, bladder, pancreas, stomach, and cervix  If you already have cancer, you increase the benefits of chemotherapy  You also reduce your risk for cancer returning or a second cancer from developing  · You reduce your risk for heart disease, blood clots, heart attack, and stroke  · You reduce your risk for lung infections, and diseases such as pneumonia, asthma, chronic bronchitis, and emphysema  · Your circulation improves  More oxygen can be delivered to your body  If you have diabetes, you lower your risk for complications, such as kidney, artery, and eye diseases  You also lower your risk for nerve damage  Nerve damage can lead to amputations, poor vision, and blindness  · You improve your body's ability to heal and to fight infections  Benefits to the health of others if you stop smoking:  Tobacco is harmful to nonsmokers who breathe in your secondhand smoke  The following are ways the health of others around you may improve when you stop smoking:  · You lower the risks for lung cancer and heart disease in nonsmoking adults       · If you are pregnant, you lower the risk for miscarriage, early delivery, low birth weight, and stillbirth  You also lower your baby's risk for SIDS, obesity, developmental delay, and neurobehavioral problems, such as ADHD  · If you have children, you lower their risk for ear infections, colds, pneumonia, bronchitis, and asthma  For more information and support to stop smoking:   · Smokefree  gov  Phone: 3- 041 - 705-4706  Web Address: [employer]  Follow up with your healthcare provider as directed:  Write down your questions so you remember to ask them during your visits  © 2017 [address] [facility] Information is for End User's use only and may not be sold, redistributed or otherwise used for commercial purposes  All illustrations and images included in CareNotes® are the copyrighted property of A D A M , Inc  or [employer]  The above information is an  only  It is not intended as medical advice for individual conditions or treatments  Talk to your doctor, nurse or pharmacist before following any medical regimen to see if it is safe and effective for you  Fall Prevention   AMBULATORY CARE:   Fall prevention  includes ways to make your home and other areas safer  It also includes ways you can move more carefully to prevent a fall  Health conditions that cause changes in your blood pressure, vision, or muscle strength and coordination may increase your risk for falls  Medicines may also increase your risk for falls if they make you dizzy, weak, or sleepy  Call 911 or have someone else call if:   · You have fallen and are unconscious  · You have fallen and cannot move part of your body  Contact your healthcare provider if:   · You have fallen and have pain or a headache  · You have questions or concerns about your condition or care  Fall prevention tips:   · Stand or sit up slowly  This may help you keep your balance and prevent falls  · Use assistive devices as directed    Your healthcare provider may suggest that you use a cane or walker to help you keep your balance  You may need to have grab bars put in your bathroom near the toilet or in the shower  · Wear shoes that fit well and have soles that   Wear shoes both inside and outside  Use slippers with good   Do not wear shoes with high heels  · Wear a personal alarm  This is a device that allows you to call 911 if you fall and need help  Ask your healthcare provider for more information  · Stay active  Exercise can help strengthen your muscles and improve your balance  Your healthcare provider may recommend water aerobics or walking  He or she may also recommend physical therapy to improve your coordination  Never start an exercise program without talking to your healthcare provider first      · Manage your medical conditions  Keep all appointments with your healthcare providers  Visit your eye doctor as directed  Home safety tips:   · Add items to prevent falls in the bathroom  Put nonslip strips on your bath or shower floor to prevent you from slipping  Use a bath mat if you do not have carpet in the bathroom  This will prevent you from falling when you step out of the bath or shower  Use a shower seat so you do not need to stand while you shower  Sit on the toilet or a chair in your bathroom to dry yourself and put on clothing  This will prevent you from losing your balance from drying or dressing yourself while you are standing  · Keep paths clear  Remove books, shoes, and other objects from walkways and stairs  Place cords for telephones and lamps out of the way so that you do not need to walk over them  Tape them down if you cannot move them  Remove small rugs  If you cannot remove a rug, secure it with double-sided tape  This will prevent you from tripping  · Install bright lights in your home  Use night lights to help light paths to the bathroom or kitchen  Always turn on the light before you start walking      · Keep items you use often on shelves within reach  Do not use a step stool to help you reach an item  · Paint or place reflective tape on the edges of your stairs  This will help you see the stairs better  Follow up with your healthcare provider as directed:  Write down your questions so you remember to ask them during your visits  © 2017 [address] Information is for End User's use only and may not be sold, redistributed or otherwise used for commercial purposes  All illustrations and images included in CareNotes® are the copyrighted property of A D A M , Inc  or Josh Kenney  The above information is an  only  It is not intended as medical advice for individual conditions or treatments  Talk to your doctor, nurse or pharmacist before following any medical regimen to see if it is safe and effective for you  Advance Directives   WHAT YOU NEED TO KNOW:   What are advance directives? Advance directives are legal documents that state your wishes and plans for medical care  These plans are made ahead of time in case you lose your ability to make decisions for yourself  Advance directives can apply to any medical decision, such as the treatments you want, and if you want to donate organs  What are the types of advance directives? There are many types of advance directives, and each state has rules about how to use them  You may choose a combination of any of the following:  · Living will: This is a written record of the treatment you want  You can also choose which treatments you do not want, which to limit, and which to stop at a certain time  This includes surgery, medicine, IV fluid, and tube feedings  · Durable power of  for healthcare [facility]): This is a written record that states who you want to make healthcare choices for you when you are unable to make them for yourself  This person, called a proxy, is usually a family member or a friend   You may choose more than 1 proxy     · Do not resuscitate (DNR) order:  A DNR order is used in case your heart stops beating or you stop breathing  It is a request not to have certain forms of treatment, such as CPR  A DNR order may be included in other types of advance directives  · Medical directive: This covers the care that you want if you are in a coma, near death, or unable to make decisions for yourself  You can list the treatments you want for each condition  Treatment may include pain medicine, surgery, blood transfusions, dialysis, IV or tube feedings, and a ventilator (breathing machine)  · Values history: This document has questions about your views, beliefs, and how you feel and think about life  This information can help others choose the care that you would choose  Why are advance directives important? An advance directive helps you control your care  Although spoken wishes may be used, it is better to have your wishes written down  Spoken wishes can be misunderstood, or not followed  Treatments may be given even if you do not want them  An advance directive may make it easier for your family to make difficult choices about your care  How do I decide what to put in my advance directives? · Make informed decisions:  Make sure you fully understand treatments or care you may receive  Think about the benefits and problems your decisions could cause for you or your family  Talk to healthcare providers if you have concerns or questions before you write down your wishes  You may also want to talk with your Anabaptist or , or a   Check your state laws to make sure that what you put in your advance directive is legal      · Sign all forms:  Sign and date your advance directive when you have finished  You may also need 2 witnesses to sign the forms  Witnesses cannot be your doctor or his staff, your spouse, heirs or beneficiaries, people you owe money to, or your chosen proxy   Talk to your family, proxy, and healthcare providers about your advance directive  Give each person a copy, and keep one for yourself in a place you can get to easily  Do not keep it hidden or locked away  · Review and revise your plans: You can revise your advance directive at any time, as long as you are able to make decisions  Review your plan every year, and when there are changes in your life, or your health  When you make changes, let your family, proxy, and healthcare providers know  Give each a new copy  Where can I find more information? · [employer]  [address] , [place] [address]  Phone: 8- 883 - 388-6246  Phone: 5- 932 - [phone]  Web Address: http://www  aafp org  · [address] [facility])  [address] [address] [facility], [address]  Phone: 6- [phone] - [phone]  Phone: [phone]  Web Address: Davin benitez  CARE AGREEMENT:   You have the right to help plan your care  To help with this plan, you must learn about your health condition and treatment options  You must also learn about advance directives and how they are used  Work with your healthcare providers to decide what care will be used to treat you  You always have the right to refuse treatment  The above information is an  only  It is not intended as medical advice for individual conditions or treatments  Talk to your doctor, nurse or pharmacist before following any medical regimen to see if it is safe and effective for you  © 2017 [address]  Information is for End User's use only and may not be sold, redistributed or otherwise used for commercial purposes  All illustrations and images included in CareNotes® are the copyrighted property of A D A M , Inc  or Josh Kenney

## 2019-08-05 NOTE — PROGRESS NOTES
Assessment and Plan:   As per office notes same day  Problem List Items Addressed This Visit     None      Visit Diagnoses     Need for pneumococcal vaccination    -  Primary    Relevant Orders    PNEUMOCOCCAL POLYSACCHARIDE VACCINE 23-VALENT =>3YO SQ IM         History of Present Illness:     Patient presents for Welcome to Medicare visit  Questionnaire was reviewed, clarified, and updated with the patient and her  was present today  Patient Care Team:  Pavan Urena MD as PCP - Nadine Sandhu MD     Review of Systems:     Review of Systems   Gastrointestinal: Negative for bowel incontinence  Psychiatric/Behavioral: The patient is not nervous/anxious  As per office notes same day  Problem List:     Patient Active Problem List   Diagnosis    Allergic rhinitis    Chronic insomnia    Hypothyroidism, adult    TIA (transient ischemic attack)    Left carotid bruit    Hyperlipidemia    Dry skin dermatitis      Past Medical and Surgical History:     No past medical history on file  Past Surgical History:   Procedure Laterality Date    FOOT SURGERY  2007      Family History:     Family History   Problem Relation Age of Onset   Camille Marino Parkinsonism Mother     Diabetes Father     Hyperlipidemia Father     Hypertension Father     Other Father         cardiac Disorder      Social History:     Social History     Tobacco Use   Smoking Status Former Smoker   Smokeless Tobacco Never Used   Tobacco Comment    Quit smoking at 21years of age     Social History     Substance and Sexual Activity   Alcohol Use Yes    Comment: occasional     Social History     Substance and Sexual Activity   Drug Use No      Medications and Allergies:     Current Outpatient Medications   Medication Sig Dispense Refill    triamcinolone (KENALOG) 0 5 % cream Apply topically 3 (three) times a day 30 g 2     No current facility-administered medications for this visit        Allergies   Allergen Reactions    Latex     Molds & Smuts     Penicillins       Immunizations:     Immunization History   Administered Date(s) Administered    INFLUENZA 10/02/2014, 10/02/2014, 10/27/2016, 10/27/2016, 11/19/2017    Influenza Split High Dose Preservative Free IM 11/19/2017    Influenza, high dose seasonal 0 5 mL 10/11/2018    Pneumococcal Conjugate 13-Valent 12/01/2016, 12/01/2016, 12/19/2017      Medicare Screening Tests and Risk Assessments:     Ruben Barboza is here for her Initial Wellness visit  Health Risk Assessment:  Patient rates overall health as good  Patient feels that their physical health rating is Same  Eyesight was rated as Same  Hearing was rated as Same  Patient feels that their emotional and mental health rating is Same  Pain experienced by patient in the last 7 days has been None  Patient states that she has experienced no weight loss or gain in last 6 months  Emotional/Mental Health:  Patient has not been feeling nervous/anxious  PHQ-9 Depression Screening:    Frequency of the following problems over the past two weeks:      1  Little interest or pleasure in doing things: 0 - not at all      2  Feeling down, depressed, or hopeless: 0 - not at all  PHQ-2 Score: 0          Broken Bones/Falls: Fall Risk Assessment:    In the past year, patient has experienced: No history of falling in past year          Bladder/Bowel:  Patient has not leaked urine accidently in the last six months  Patient reports no loss of bowel control  Immunizations:  Patient has had a flu vaccination within the last year  Patient has received a pneumonia shot  Patient has received a shingles shot  Patient has received tetanus/diphtheria shot  Home Safety:  Patient does not have trouble with stairs inside or outside of their home  Patient currently reports that there are safety hazards present in home , working smoke alarms, working carbon monoxide detectors        Preventative Screenings:   Breast cancer screening performed, colon cancer screen completed, cholesterol screen completed, glaucoma eye exam completed,     Nutrition:  Current diet: Regular, No Added Salt and Limited junk food with servings of the following:    Medications:  Patient is not currently taking any over-the-counter supplements  Patient is able to manage medications  Lifestyle Choices:  Patient reports no tobacco use  Patient has smoked or used tobacco in the past   Patient has stopped her tobacco use  Patient reports no alcohol use  Patient drives a vehicle  Patient wears seat belt  Current level of exercise of physical activity described by patient as: walking  Activities of Daily Living:  Can get out of bed by his or her self, able to dress self, able to make own meals, able to do own shopping, able to bathe self, can do own laundry/housekeeping, can manage own money, pay bills and track expenses    Previous Hospitalizations:  Hospitalization or ED visit in past 12 months  Number of hospitalizations within the last year: 1-2        Advanced Directives:  Patient has decided on a power of   Patient has spoken to designated power of   Patient has completed advanced directive          Preventative Screening/Counseling:      Cardiovascular:      General: Risks and Benefits Discussed and Screening Current          Diabetes:      General: Risks and Benefits Discussed and Screening Current          Colorectal Cancer:      General: Risks and Benefits Discussed and Screening Current          Breast Cancer:      General: Risks and Benefits Discussed and Screening Current          Cervical Cancer:      General: Risks and Benefits Discussed and Screening Current          Osteoporosis:      General: Risks and Benefits Discussed          AAA:      General: Risks and Benefits Discussed          Glaucoma:      General: Risks and Benefits Discussed and Screening Current          HIV:      General: Risks and Benefits Discussed          Hepatitis C: General: Risks and Benefits Discussed and Screening Not Indicated        Advanced Directives:   Patient has living will for healthcare, has durable POA for healthcare, patient has an advanced directive  Information on ACP and/or AD not provided  No 5 wishes given  End of life assessment reviewed with patient  Provider agrees with end of life decisions   Additional Comments: WILL BRING COPY TO OFFICE  Immunizations:      Influenza: Risks & Benefits Discussed and Influenza UTD This Year      Pneumococcal: Risks & Benefits Discussed and Pneumococcal Due Today      Shingrix: Risks & Benefits Discussed      TD: Risks & Benefits Discussed          No exam data present     Physical Exam:     There were no vitals taken for this visit  Physical Exam    As per office notes same day

## 2019-08-05 NOTE — PROGRESS NOTES
Assessment/Plan:    Will check patient's screening labs, and report results to her when available     Schedule follow-up when returning from [place]  Return if symptoms worsen or fail to improve, for when returns from [place]  Diagnoses and all orders for this visit:    Encounter for Medicare annual wellness exam    Hypothyroidism, adult  -     CBC and differential; Future  -     Comprehensive metabolic panel; Future  -     T4, free; Future  -     TSH, 3rd generation; Future    Hyperlipidemia, unspecified hyperlipidemia type  -     Lipid panel; Future    Need for pneumococcal vaccination  -     PNEUMOCOCCAL POLYSACCHARIDE VACCINE 23-VALENT =>3YO SQ IM    Myalgia  -     Vitamin D 25 hydroxy; Future          Subjective:      Patient ID: Ciro Cheadle is a 80 y o  female  Follow-up    44-year-old female who back in May had returned from [place] after convalescing from an acute pneumonia  Patient's  was very concerned about her for several months as he noted her memory to be impaired  Today both feel that her memory has improved  They both admit they are forgetful, but her memory is not as bad as it had been 1 month ago  In 2017 she had a workup for a TIA, MRI did show microvascular disease but no acute stroke  Carotid ultrasound was essentially unremarkable this done due to a left carotid bruit  Patient takes no medications other than supplements of B12 and vitamin-D  She does have a history of low active thyroid, however is not on medication  Also history of hyperlipidemia for which she refuses to take any medication  On complete review of systems today patient is denying all symptomatology  On further questioning she did admit that she has been having some muscle aching and twitching        ALLERGIES:  Allergies   Allergen Reactions    Latex     Molds & Smuts     Penicillins        CURRENT MEDICATIONS:    Current Outpatient Medications:     triamcinolone (KENALOG) 0 5 % cream, Apply topically 3 (three) times a day, Disp: 30 g, Rfl: 2    ACTIVE PROBLEM LIST:  Patient Active Problem List   Diagnosis    Allergic rhinitis    Chronic insomnia    Hypothyroidism, adult    TIA (transient ischemic attack)    Left carotid bruit    Hyperlipidemia    Dry skin dermatitis    Osteoporosis       PAST MEDICAL HISTORY:  History reviewed  No pertinent past medical history      PAST SURGICAL HISTORY:  Past Surgical History:   Procedure Laterality Date    FOOT SURGERY  2007       FAMILY HISTORY:  Family History   Problem Relation Age of Onset   [place] Parkinsonism Mother     Diabetes Father     Hyperlipidemia Father     Hypertension Father     Other Father         cardiac Disorder       SOCIAL HISTORY:  Social History     Socioeconomic History    Marital status: /Civil Union     Spouse name: Not on file    Number of children: 2    Years of education: Not on file    Highest education level: Not on file   Occupational History    Occupation: Retired   Social Needs    Financial resource strain: Not on file    Food insecurity:     Worry: Not on file     Inability: Not on file   [employer] needs:     Medical: Not on file     Non-medical: Not on file   Tobacco Use    Smoking status: Former Smoker    Smokeless tobacco: Never Used    Tobacco comment: Quit smoking at 21years of age   Substance and Sexual Activity    Alcohol use: Yes     Comment: occasional    Drug use: No    Sexual activity: Never     Partners: Male   Lifestyle    Physical activity:     Days per week: Not on file     Minutes per session: Not on file    Stress: Not on file   Relationships    Social connections:     Talks on phone: Not on file     Gets together: Not on file     Attends [employer] service: Not on file     Active member of club or organization: Not on file     Attends meetings of clubs or organizations: Not on file     Relationship status: Not on file    Intimate partner violence:     Fear of current or ex partner: Not on file     Emotionally abused: Not on file     Physically abused: Not on file     Forced sexual activity: Not on file   Other Topics Concern    Not on file   Social History Narrative    Caffeine use    Graduated from high school    Lives with adult children    Lives with     Denied: History of High risk sexual behavior       Review of Systems   Constitutional: Negative for activity change, chills, fatigue and fever  HENT: Negative for congestion  Eyes: Negative for discharge  Respiratory: Negative for cough, chest tightness and shortness of breath  Cardiovascular: Negative for chest pain, palpitations and leg swelling  Gastrointestinal: Negative for abdominal pain, blood in stool, constipation, diarrhea, nausea and vomiting  Endocrine: Negative for polydipsia, polyphagia and polyuria  Genitourinary: Negative for difficulty urinating  Musculoskeletal: Positive for myalgias  Negative for arthralgias  Skin: Negative for rash  Allergic/Immunologic: Negative for immunocompromised state  Neurological: Negative for dizziness, syncope, weakness, light-headedness and headaches  Hematological: Negative for adenopathy  Does not bruise/bleed easily  Psychiatric/Behavioral: Negative for dysphoric mood, sleep disturbance and suicidal ideas  The patient is not nervous/anxious  Objective:  Vitals:    08/05/19 1349   BP: 124/70   BP Location: Left arm   Patient Position: Sitting   Cuff Size: Standard   Pulse: 85   Resp: 18   SpO2: 96%   Weight: 49 4 kg (109 lb)   Height: 5' 2" (1 575 m)     Body mass index is 19 94 kg/m²  Physical Exam   Constitutional: She is oriented to person, place, and time  She appears well-developed and well-nourished  No distress  Neck: Neck supple  No JVD present  Carotid bruit is present (Left carotid bruit present)  Cardiovascular: Normal rate, regular rhythm and normal heart sounds     Pulmonary/Chest: Effort normal and breath sounds normal  She has no wheezes  She exhibits no tenderness  Musculoskeletal: She exhibits no edema  Lymphadenopathy:     She has no cervical adenopathy  Neurological: She is alert and oriented to person, place, and time  She displays normal reflexes  No cranial nerve deficit or sensory deficit  She exhibits normal muscle tone  Coordination normal    Skin: Skin is warm and dry  No rash noted  Psychiatric: She has a normal mood and affect  Her behavior is normal    Nursing note and vitals reviewed  RESULTS:    No results found for this or any previous visit (from the past 1008 hour(s))  This note was created with voice recognition software  Phonic, grammatical and spelling errors may be present within the note as a result

## 2019-08-06 ENCOUNTER — APPOINTMENT (OUTPATIENT)
Dept: LAB | Facility: HOSPITAL | Age: 84
End: 2019-08-06
Payer: COMMERCIAL

## 2019-08-06 DIAGNOSIS — E78.5 HYPERLIPIDEMIA, UNSPECIFIED HYPERLIPIDEMIA TYPE: ICD-10-CM

## 2019-08-06 DIAGNOSIS — M79.10 MYALGIA: ICD-10-CM

## 2019-08-06 DIAGNOSIS — E03.9 HYPOTHYROIDISM, ADULT: ICD-10-CM

## 2019-08-06 LAB
25(OH)D3 SERPL-MCNC: 23.1 NG/ML (ref 30–100)
ALBUMIN SERPL BCP-MCNC: 3.6 G/DL (ref 3.5–5)
ALP SERPL-CCNC: 88 U/L (ref 46–116)
ALT SERPL W P-5'-P-CCNC: 23 U/L (ref 12–78)
ANION GAP SERPL CALCULATED.3IONS-SCNC: 5 MMOL/L (ref 4–13)
AST SERPL W P-5'-P-CCNC: 19 U/L (ref 5–45)
BASOPHILS # BLD AUTO: 0.06 THOUSANDS/ΜL (ref 0–0.1)
BASOPHILS NFR BLD AUTO: 1 % (ref 0–1)
BILIRUB SERPL-MCNC: 0.5 MG/DL (ref 0.2–1)
BUN SERPL-MCNC: 19 MG/DL (ref 5–25)
CALCIUM SERPL-MCNC: 9.2 MG/DL (ref 8.3–10.1)
CHLORIDE SERPL-SCNC: 104 MMOL/L (ref 100–108)
CHOLEST SERPL-MCNC: 313 MG/DL (ref 50–200)
CO2 SERPL-SCNC: 30 MMOL/L (ref 21–32)
CREAT SERPL-MCNC: 0.54 MG/DL (ref 0.6–1.3)
EOSINOPHIL # BLD AUTO: 0.12 THOUSAND/ΜL (ref 0–0.61)
EOSINOPHIL NFR BLD AUTO: 1 % (ref 0–6)
ERYTHROCYTE [DISTWIDTH] IN BLOOD BY AUTOMATED COUNT: 13.3 % (ref 11.6–15.1)
GFR SERPL CREATININE-BSD FRML MDRD: 86 ML/MIN/1.73SQ M
GLUCOSE P FAST SERPL-MCNC: 90 MG/DL (ref 65–99)
HCT VFR BLD AUTO: 36.6 % (ref 34.8–46.1)
HDLC SERPL-MCNC: 94 MG/DL (ref 40–60)
HGB BLD-MCNC: 11.8 G/DL (ref 11.5–15.4)
IMM GRANULOCYTES # BLD AUTO: 0.03 THOUSAND/UL (ref 0–0.2)
IMM GRANULOCYTES NFR BLD AUTO: 0 % (ref 0–2)
LDLC SERPL CALC-MCNC: 209 MG/DL (ref 0–100)
LYMPHOCYTES # BLD AUTO: 2.14 THOUSANDS/ΜL (ref 0.6–4.47)
LYMPHOCYTES NFR BLD AUTO: 21 % (ref 14–44)
MCH RBC QN AUTO: 30.3 PG (ref 26.8–34.3)
MCHC RBC AUTO-ENTMCNC: 32.2 G/DL (ref 31.4–37.4)
MCV RBC AUTO: 94 FL (ref 82–98)
MONOCYTES # BLD AUTO: 0.87 THOUSAND/ΜL (ref 0.17–1.22)
MONOCYTES NFR BLD AUTO: 8 % (ref 4–12)
NEUTROPHILS # BLD AUTO: 7.19 THOUSANDS/ΜL (ref 1.85–7.62)
NEUTS SEG NFR BLD AUTO: 69 % (ref 43–75)
NONHDLC SERPL-MCNC: 219 MG/DL
NRBC BLD AUTO-RTO: 0 /100 WBCS
PLATELET # BLD AUTO: 207 THOUSANDS/UL (ref 149–390)
PMV BLD AUTO: 12.1 FL (ref 8.9–12.7)
POTASSIUM SERPL-SCNC: 4.3 MMOL/L (ref 3.5–5.3)
PROT SERPL-MCNC: 7.1 G/DL (ref 6.4–8.2)
RBC # BLD AUTO: 3.9 MILLION/UL (ref 3.81–5.12)
SODIUM SERPL-SCNC: 139 MMOL/L (ref 136–145)
T4 FREE SERPL-MCNC: 0.87 NG/DL (ref 0.76–1.46)
TRIGL SERPL-MCNC: 50 MG/DL
TSH SERPL DL<=0.05 MIU/L-ACNC: 3.24 UIU/ML (ref 0.36–3.74)
WBC # BLD AUTO: 10.41 THOUSAND/UL (ref 4.31–10.16)

## 2019-08-06 PROCEDURE — 84439 ASSAY OF FREE THYROXINE: CPT

## 2019-08-06 PROCEDURE — 36415 COLL VENOUS BLD VENIPUNCTURE: CPT

## 2019-08-06 PROCEDURE — 84443 ASSAY THYROID STIM HORMONE: CPT

## 2019-08-06 PROCEDURE — 82306 VITAMIN D 25 HYDROXY: CPT

## 2019-08-06 PROCEDURE — 80061 LIPID PANEL: CPT

## 2019-08-06 PROCEDURE — 80053 COMPREHEN METABOLIC PANEL: CPT

## 2019-08-06 PROCEDURE — 85025 COMPLETE CBC W/AUTO DIFF WBC: CPT

## 2019-10-22 ENCOUNTER — CLINICAL SUPPORT (OUTPATIENT)
Dept: INTERNAL MEDICINE CLINIC | Facility: CLINIC | Age: 84
End: 2019-10-22
Payer: COMMERCIAL

## 2019-10-22 DIAGNOSIS — Z23 NEED FOR INFLUENZA VACCINATION: Primary | ICD-10-CM

## 2019-10-22 PROCEDURE — 90662 IIV NO PRSV INCREASED AG IM: CPT

## 2019-10-22 PROCEDURE — G0008 ADMIN INFLUENZA VIRUS VAC: HCPCS

## 2019-12-23 ENCOUNTER — HOSPITAL ENCOUNTER (EMERGENCY)
Facility: HOSPITAL | Age: 84
Discharge: HOME | End: 2019-12-24
Attending: EMERGENCY MEDICINE | Admitting: EMERGENCY MEDICINE
Payer: COMMERCIAL

## 2019-12-23 DIAGNOSIS — I10 HYPERTENSION: Primary | ICD-10-CM

## 2019-12-23 DIAGNOSIS — R51.9 HEADACHE: ICD-10-CM

## 2019-12-23 PROCEDURE — 99284 EMERGENCY DEPT VISIT MOD MDM: CPT | Performed by: EMERGENCY MEDICINE

## 2019-12-23 PROCEDURE — 99284 EMERGENCY DEPT VISIT MOD MDM: CPT

## 2019-12-23 PROCEDURE — 93005 ELECTROCARDIOGRAM TRACING: CPT

## 2019-12-23 RX ORDER — LABETALOL 20 MG/4 ML (5 MG/ML) INTRAVENOUS SYRINGE
10 ONCE
Status: COMPLETED | OUTPATIENT
Start: 2019-12-23 | End: 2019-12-24

## 2019-12-24 ENCOUNTER — OFFICE VISIT (OUTPATIENT)
Dept: INTERNAL MEDICINE CLINIC | Facility: CLINIC | Age: 84
End: 2019-12-24
Payer: COMMERCIAL

## 2019-12-24 ENCOUNTER — APPOINTMENT (EMERGENCY)
Dept: CT IMAGING | Facility: HOSPITAL | Age: 84
End: 2019-12-24
Payer: COMMERCIAL

## 2019-12-24 VITALS
TEMPERATURE: 97.5 F | DIASTOLIC BLOOD PRESSURE: 81 MMHG | HEIGHT: 62 IN | OXYGEN SATURATION: 98 % | BODY MASS INDEX: 20.08 KG/M2 | RESPIRATION RATE: 21 BRPM | HEART RATE: 67 BPM | SYSTOLIC BLOOD PRESSURE: 177 MMHG | WEIGHT: 109.13 LBS

## 2019-12-24 VITALS
WEIGHT: 108 LBS | SYSTOLIC BLOOD PRESSURE: 140 MMHG | RESPIRATION RATE: 18 BRPM | DIASTOLIC BLOOD PRESSURE: 72 MMHG | BODY MASS INDEX: 19.88 KG/M2 | OXYGEN SATURATION: 93 % | HEART RATE: 90 BPM | HEIGHT: 62 IN

## 2019-12-24 DIAGNOSIS — I10 ESSENTIAL HYPERTENSION: Primary | ICD-10-CM

## 2019-12-24 LAB
ANION GAP SERPL CALCULATED.3IONS-SCNC: 6 MMOL/L (ref 4–13)
ATRIAL RATE: 73 BPM
BASOPHILS # BLD AUTO: 0.06 THOUSANDS/ΜL (ref 0–0.1)
BASOPHILS NFR BLD AUTO: 1 % (ref 0–1)
BUN SERPL-MCNC: 26 MG/DL (ref 5–25)
CALCIUM SERPL-MCNC: 9 MG/DL (ref 8.3–10.1)
CHLORIDE SERPL-SCNC: 105 MMOL/L (ref 100–108)
CO2 SERPL-SCNC: 30 MMOL/L (ref 21–32)
CREAT SERPL-MCNC: 0.72 MG/DL (ref 0.6–1.3)
EOSINOPHIL # BLD AUTO: 0.2 THOUSAND/ΜL (ref 0–0.61)
EOSINOPHIL NFR BLD AUTO: 2 % (ref 0–6)
ERYTHROCYTE [DISTWIDTH] IN BLOOD BY AUTOMATED COUNT: 13.2 % (ref 11.6–15.1)
GFR SERPL CREATININE-BSD FRML MDRD: 77 ML/MIN/1.73SQ M
GLUCOSE SERPL-MCNC: 98 MG/DL (ref 65–140)
HCT VFR BLD AUTO: 37.1 % (ref 34.8–46.1)
HGB BLD-MCNC: 11.8 G/DL (ref 11.5–15.4)
IMM GRANULOCYTES # BLD AUTO: 0.02 THOUSAND/UL (ref 0–0.2)
IMM GRANULOCYTES NFR BLD AUTO: 0 % (ref 0–2)
LYMPHOCYTES # BLD AUTO: 2.98 THOUSANDS/ΜL (ref 0.6–4.47)
LYMPHOCYTES NFR BLD AUTO: 36 % (ref 14–44)
MCH RBC QN AUTO: 29.9 PG (ref 26.8–34.3)
MCHC RBC AUTO-ENTMCNC: 31.8 G/DL (ref 31.4–37.4)
MCV RBC AUTO: 94 FL (ref 82–98)
MONOCYTES # BLD AUTO: 0.8 THOUSAND/ΜL (ref 0.17–1.22)
MONOCYTES NFR BLD AUTO: 10 % (ref 4–12)
NEUTROPHILS # BLD AUTO: 4.19 THOUSANDS/ΜL (ref 1.85–7.62)
NEUTS SEG NFR BLD AUTO: 51 % (ref 43–75)
NRBC BLD AUTO-RTO: 0 /100 WBCS
P AXIS: 38 DEGREES
PLATELET # BLD AUTO: 221 THOUSANDS/UL (ref 149–390)
PMV BLD AUTO: 11.8 FL (ref 8.9–12.7)
POTASSIUM SERPL-SCNC: 4.5 MMOL/L (ref 3.5–5.3)
PR INTERVAL: 174 MS
QRS AXIS: -13 DEGREES
QRSD INTERVAL: 88 MS
QT INTERVAL: 412 MS
QTC INTERVAL: 453 MS
RBC # BLD AUTO: 3.94 MILLION/UL (ref 3.81–5.12)
SODIUM SERPL-SCNC: 141 MMOL/L (ref 136–145)
T WAVE AXIS: 10 DEGREES
TROPONIN I SERPL-MCNC: <0.02 NG/ML
VENTRICULAR RATE: 73 BPM
WBC # BLD AUTO: 8.25 THOUSAND/UL (ref 4.31–10.16)

## 2019-12-24 PROCEDURE — 84484 ASSAY OF TROPONIN QUANT: CPT | Performed by: EMERGENCY MEDICINE

## 2019-12-24 PROCEDURE — 70496 CT ANGIOGRAPHY HEAD: CPT

## 2019-12-24 PROCEDURE — 96374 THER/PROPH/DIAG INJ IV PUSH: CPT

## 2019-12-24 PROCEDURE — 70498 CT ANGIOGRAPHY NECK: CPT

## 2019-12-24 PROCEDURE — 85025 COMPLETE CBC W/AUTO DIFF WBC: CPT | Performed by: EMERGENCY MEDICINE

## 2019-12-24 PROCEDURE — 36415 COLL VENOUS BLD VENIPUNCTURE: CPT | Performed by: EMERGENCY MEDICINE

## 2019-12-24 PROCEDURE — 93010 ELECTROCARDIOGRAM REPORT: CPT | Performed by: INTERNAL MEDICINE

## 2019-12-24 PROCEDURE — 99213 OFFICE O/P EST LOW 20 MIN: CPT | Performed by: PHYSICIAN ASSISTANT

## 2019-12-24 PROCEDURE — 80048 BASIC METABOLIC PNL TOTAL CA: CPT | Performed by: EMERGENCY MEDICINE

## 2019-12-24 RX ADMIN — LABETALOL 20 MG/4 ML (5 MG/ML) INTRAVENOUS SYRINGE 10 MG: at 00:06

## 2019-12-24 RX ADMIN — IOHEXOL 85 ML: 350 INJECTION, SOLUTION INTRAVENOUS at 00:44

## 2019-12-24 NOTE — ED PROVIDER NOTES
History  Chief Complaint   Patient presents with    High Blood Pressure     pt c/o high blood pressure at home, pt also states " the back of my head doesn't feel alright, it feels off"  pt denies headache/dizziness/nausea/vomiting  80 y o  female presents with a chief complaint of "I have a funny feeling in the back of my head" since 1800 hours that the patient describes as "like tingly" sensation that she denies is a headache  Patient states she subsequently took her blood pressure a few hours ago, noted it was elevated so she came to the emergency room for further evaluation and treatment  Patient denies any exacerbating or alleviating factors to this sensation  Patient's  gave her half of one of her blood pressure medications  Patient notes "I feel little off balance" when walking and "a little tingly" in her bilateral hands but otherwise denies motor or sensory deficits  Patient has a history of multiple prior TIAs reportedly though there are normal MRI is in the medical record  Patient denies a history of hypertension but does not regularly follow with primary care, she is unable to identify the last time she saw primary care physician  She was previously admitted for pneumonia over a year ago which was the last time she saw physician  Patient denies any concerns for CO exposure  Patient denies any recent tick bites  Patient denies any recent cervical manipulation  Patient denies any recent trauma  Patient denies any history of connective tissue disorders  Patient denies any history or family history of SAH  Patient denies any history of immunocompromised state  Patient denies any use of illicit drugs  Patient denies any nausea or vomiting  Patient denies any fever or chills  Patient denies any visual changes  Patient denies any focal weakness      ROS: patient denies seizure, weight loss, confusion, diaphoresis, neck pain/stiffness, facial pain, speech difficulty, gait disturbance, vertigo, lightheadedness, jaw claudication, syncope  All other review of systems reviewed and noted to be negative  Focused Objective:  Eyes:  PERRL, EOMI  Normal fundoscopic exam with no signs of papilledema or retinal hemorrhage  No nystagmus with gaze fixation  HENT: Atraumatic  Pharynx moist and non-erythematous  No tenderness or swelling over the temporal arteries  Neck: no carotid bruit, no tenderness to palpitation  Able to touch chin to chest   Negative jolt accentuation testing  Neuro:  Alert and answers questions appropriately  No dysarthria  Normal tandem gait, including toe walking and heel walking  Normal Romberg exam   No pronator drift  Normal finger to nose  Normal fine motor function with rapid finger movements  Normal hand tap  Cranial nerves II through XII grossly intact  Visual fields grossly intact  Upper and lower motor strength 5/5 and symmetric  Normal light touch sensory exam    Normal DTRs  No arm drift  Medical Decision Making  Patient presenting with posterior paresthesia with associated hypertension representing a broad differential      Considering history of reported repeated TIAs, will obtain CT imaging to evaluate for potential intracranial vascular injury  Patient is outside the window for any potential thrombolytics and is unlikely to have findings representing large vessel occlusion so no stroke alert will be initiated  Patient has no arm drift, less likely large vessel occlusion  Will treat patient with labetalol as her blood pressure continues to be significantly elevated  Will monitor and reassess need for additional treatment considering possibility of hypertensive urgency  Will obtain cardiac evaluation while continuing to monitor        Hypertension   Severity:  Severe  Onset quality:  Sudden  Timing:  Constant  Progression:  Unchanged  Relieved by:  Nothing  Associated symptoms: no abdominal pain, no anxiety, no blurred vision, no chest pain, no confusion, no dizziness, no ear pain, no epistaxis, no fatigue, no fever, no headaches, no hematuria, no loss of consciousness, no nausea, no neck pain, no palpitations, no peripheral edema, no shortness of breath, no syncope, no tinnitus, not vomiting and no weakness        Prior to Admission Medications   Prescriptions Last Dose Informant Patient Reported? Taking?   triamcinolone (KENALOG) 0 5 % cream  Self No No   Sig: Apply topically 3 (three) times a day   Patient taking differently: Apply topically as needed       Facility-Administered Medications: None       History reviewed  No pertinent past medical history  Past Surgical History:   Procedure Laterality Date    FOOT SURGERY  2007       Family History   Problem Relation Age of Onset    Parkinsonism Mother     Diabetes Father     Hyperlipidemia Father     Hypertension Father     Other Father         cardiac Disorder     I have reviewed and agree with the history as documented  Social History     Tobacco Use    Smoking status: Former Smoker    Smokeless tobacco: Never Used    Tobacco comment: Quit smoking at 21years of age   Substance Use Topics    Alcohol use: Yes     Comment: occasional    Drug use: No        Review of Systems   Constitutional: Negative for fatigue and fever  HENT: Negative for ear pain, nosebleeds and tinnitus  Eyes: Negative for blurred vision  Respiratory: Negative for shortness of breath  Cardiovascular: Negative for chest pain, palpitations and syncope  Gastrointestinal: Negative for abdominal pain, nausea and vomiting  Genitourinary: Negative for hematuria  Musculoskeletal: Negative for neck pain  Neurological: Negative for dizziness, loss of consciousness, weakness and headaches  Psychiatric/Behavioral: Negative for confusion  The patient is not nervous/anxious  All other systems reviewed and are negative        Physical Exam  Physical Exam   Constitutional: She is oriented to person, place, and time  She appears well-developed and well-nourished  No distress  HENT:   Head: Normocephalic and atraumatic  Eyes: Pupils are equal, round, and reactive to light  Neck: Neck supple  Cardiovascular: Normal rate and regular rhythm  Pulmonary/Chest: Effort normal and breath sounds normal    Abdominal: Soft  She exhibits no distension  There is no tenderness  Musculoskeletal: She exhibits no deformity  Neurological: She is alert and oriented to person, place, and time  She displays normal reflexes  No cranial nerve deficit or sensory deficit  She exhibits normal muscle tone  Coordination normal    See HPI for details  Skin: Skin is warm and dry  She is not diaphoretic  Psychiatric: She has a normal mood and affect  Vitals reviewed        Vital Signs  ED Triage Vitals [12/23/19 2327]   Temperature Pulse Respirations Blood Pressure SpO2   97 5 °F (36 4 °C) 77 18 (!) 229/109 100 %      Temp Source Heart Rate Source Patient Position - Orthostatic VS BP Location FiO2 (%)   Oral Monitor Lying Right arm --      Pain Score       No Pain           Vitals:    12/23/19 2327 12/24/19 0000 12/24/19 0030 12/24/19 0130   BP: (!) 229/109 (!) 189/88 167/77 (!) 177/81   Pulse: 77 73 67 67   Patient Position - Orthostatic VS: Lying Lying Lying Lying         Visual Acuity      ED Medications  Medications   Labetalol HCl (NORMODYNE) injection 10 mg (10 mg Intravenous Given 12/24/19 0006)   iohexol (OMNIPAQUE) 350 MG/ML injection (MULTI-DOSE) 85 mL (85 mL Intravenous Given 12/24/19 0044)       Diagnostic Studies  Results Reviewed     Procedure Component Value Units Date/Time    Troponin I [[id number]]  (Normal) Collected:  12/24/19 0005    Lab Status:  Final result Specimen:  Blood from Arm, Right Updated:  12/24/19 0031     Troponin I <0 02 ng/mL     Basic metabolic panel [[id number]]  (Abnormal) Collected:  12/24/19 0005    Lab Status:  Final result Specimen:  Blood from Arm, Right Updated:  12/24/19 0029     Sodium 141 mmol/L      Potassium 4 5 mmol/L      Chloride 105 mmol/L      CO2 30 mmol/L      ANION GAP 6 mmol/L      BUN 26 mg/dL      Creatinine 0 72 mg/dL      Glucose 98 mg/dL      Calcium 9 0 mg/dL      eGFR 77 ml/min/1 73sq m     Narrative:       [place] guidelines for Chronic Kidney Disease (CKD):     Stage 1 with normal or high GFR (GFR > 90 mL/min/1 73 square meters)    Stage 2 Mild CKD (GFR = 60-89 mL/min/1 73 square meters)    Stage 3A Moderate CKD (GFR = 45-59 mL/min/1 73 square meters)    Stage 3B Moderate CKD (GFR = 30-44 mL/min/1 73 square meters)    Stage 4 Severe CKD (GFR = 15-29 mL/min/1 73 square meters)    Stage 5 End Stage CKD (GFR <15 mL/min/1 73 square meters)  Note: GFR calculation is accurate only with a steady state creatinine    CBC and differential [[id number]] Collected:  12/24/19 0005    Lab Status:  Final result Specimen:  Blood from Arm, Right Updated:  12/24/19 0014     WBC 8 25 Thousand/uL      RBC 3 94 Million/uL      Hemoglobin 11 8 g/dL      Hematocrit 37 1 %      MCV 94 fL      MCH 29 9 pg      MCHC 31 8 g/dL      RDW 13 2 %      MPV 11 8 fL      Platelets 268 Thousands/uL      nRBC 0 /100 WBCs      Neutrophils Relative 51 %      Immat GRANS % 0 %      Lymphocytes Relative 36 %      Monocytes Relative 10 %      Eosinophils Relative 2 %      Basophils Relative 1 %      Neutrophils Absolute 4 19 Thousands/µL      Immature Grans Absolute 0 02 Thousand/uL      Lymphocytes Absolute 2 98 Thousands/µL      Monocytes Absolute 0 80 Thousand/µL      Eosinophils Absolute 0 20 Thousand/µL      Basophils Absolute 0 06 Thousands/µL                  CTA head and neck with and without contrast   Final Result by Noemi Martinez MD (12/24 0120)      1  No acute intracranial hemorrhage, mass effect or extra-axial collection  Moderate microvascular ischemic disease     2   No hemodynamically significant stenosis, dissection or occlusion of the carotid or vertebral arteries  3   No intracranial aneurysm  Moderate stenosis of the distal right M1 segment  Moderate to severe stenosis at the left proximal inferior M2 division  Moderate to severe stenosis at the right P2 segment                  Workstation performed: [id number]                    Procedures  Procedures         ED Course  ED Course as of Dec 24 0503   Tue Dec 24, 2019   0136 Patient's symptoms resolved following treatment  Patient's CT findings with several areas of moderate to severe stenosis  I discussed this with the patient, discussed options regarding evaluation and treatment  After discussion with the patient, she declined observation to the hospital as her symptoms had resolved  Patient's EKG demonstrating normal sinus rhythm with no acute ST segment changes  Patient's laboratory evaluation is unremarkable  Patient appears clear competent making medical decisions and I have offered observation in the hospital   She has declined and I have no grounds to keep her against her will  Patient has agreed to alternative follow-up with primary care for recheck of her blood pressure and to return to the emergency room with any return of her symptoms  I discussed emphasized need for follow-up and return precautions in detail  Patient is asymptomatic upon discharge from the emergency room  Identification of Seniors at Risk      Most Recent Value   (ISAR) Identification of Seniors at Risk   Before the illness or injury that brought you to the Emergency, did you need someone to help you on a regular basis? 0 Filed at: 12/23/2019 2329   In the last 24 hours, have you needed more help than usual?  0 Filed at: 12/23/2019 2329   Have you been hospitalized for one or more nights during the past 6 months? 0 Filed at: 12/23/2019 2329   In general, do you see well?  0 Filed at: 12/23/2019 2329   In general, do you have serious problems with your memory?   0 Filed at: 12/23/2019 2329   Do you take more than three different medications every day?  0 Filed at: 12/23/2019 2329   ISAR Score  0 Filed at: 12/23/2019 2329                          MDM      Disposition  Final diagnoses:   Hypertension   Headache     Time reflects when diagnosis was documented in both MDM as applicable and the Disposition within this note     Time User Action Codes Description Comment    12/24/2019  1:37 AM González Jordan Add [I10] Hypertension     12/24/2019  1:37 AM González Jordan Add [R51] Headache       ED Disposition     ED Disposition Condition Date/Time Comment    Discharge Stable Tue Dec 24, 2019  1:37 AM Dartha Senior discharge to home/self care  Follow-up Information     Follow up With Specialties Details Why Contact Info Additional Information    Anca Vazquze MD Internal Medicine Schedule an appointment as soon as possible for a visit in 3 days Follow-up and reassessment  Recheck pressure  [facility] Emergency Department Emergency Medicine Go to  If symptoms worsen or return  [address] [address] ED, [address] [place] [address], [facility]          Discharge Medication List as of 12/24/2019  1:38 AM      CONTINUE these medications which have NOT CHANGED    Details   triamcinolone (KENALOG) 0 5 % cream Apply topically 3 (three) times a day, Starting Thu 10/11/2018, Normal           No discharge procedures on file      ED Provider  Electronically Signed by           Marian Young MD  12/24/19 6314

## 2019-12-24 NOTE — PROGRESS NOTES
Assessment/Plan:   Patient Instructions   Patient will start amlodipine 2 5 mg daily or 1/2 of a 5 mg tablet daily  Patient will try to take it easy over the next week  Also advised patient to start a baby aspirin daily  Also would like patient when they get to [place] to make an appointment with neurologist for evaluation  Quality Measures:       Return in about 1 week (around 12/31/2019) for Next scheduled follow up  Diagnoses and all orders for this visit:    Essential hypertension          Subjective:      Patient ID: Rai Auguste is a 80 y o  female  ER follow-up    Seen yesterday in the emergency room secondary to lightheaded feeling and feeling off  Found to have significantly elevated systolic blood pressure  Treated with IV normodyne with some improvement in her pressure, but symptomatic improved therefore she was discharged to follow up with us  Feeling better than yesterday  Delores cp, palp, sob, edema, HA, dizziness, diaphoresis, syncope, visual disturbance  Lab workup was essentially unremarkable  Imaging study of CT a of the brain did show evidence of significant arterial stenosis  This was discussed with the patient and her  was present today  They are planning to go to [place] as a do each winter in the middle of next month  They were asked to get an appointment with Neurology when they are down there  She is also to start a baby aspirin daily  ALLERGIES:  Allergies   Allergen Reactions    Latex     Molds & Smuts     Penicillins        CURRENT MEDICATIONS:    Current Outpatient Medications:     triamcinolone (KENALOG) 0 5 % cream, Apply topically 3 (three) times a day (Patient taking differently: Apply topically as needed ), Disp: 30 g, Rfl: 2  No current facility-administered medications for this visit       ACTIVE PROBLEM LIST:  Patient Active Problem List   Diagnosis    Allergic rhinitis    Chronic insomnia    Hypothyroidism, adult    TIA (transient ischemic attack)    Left carotid bruit    Hyperlipidemia    Dry skin dermatitis    Osteoporosis       PAST MEDICAL HISTORY:  History reviewed  No pertinent past medical history      PAST SURGICAL HISTORY:  Past Surgical History:   Procedure Laterality Date    FOOT SURGERY  2007       FAMILY HISTORY:  Family History   Problem Relation Age of Onset   [facility] Parkinsonism Mother     Diabetes Father     Hyperlipidemia Father     Hypertension Father     Other Father         cardiac Disorder       SOCIAL HISTORY:  Social History     Socioeconomic History    Marital status: /Civil Union     Spouse name: Not on file    Number of children: 2    Years of education: Not on file    Highest education level: Not on file   Occupational History    Occupation: Retired   Social Needs    Financial resource strain: Not on file    Food insecurity:     Worry: Not on file     Inability: Not on file   Sweepery needs:     Medical: Not on file     Non-medical: Not on file   Tobacco Use    Smoking status: Former Smoker    Smokeless tobacco: Never Used    Tobacco comment: Quit smoking at 21years of age   Substance and Sexual Activity    Alcohol use: Yes     Comment: occasional    Drug use: No    Sexual activity: Never     Partners: Male   Lifestyle    Physical activity:     Days per week: Not on file     Minutes per session: Not on file    Stress: Not on file   Relationships    Social connections:     Talks on phone: Not on file     Gets together: Not on file     Attends Sabianist service: Not on file     Active member of club or organization: Not on file     Attends meetings of clubs or organizations: Not on file     Relationship status: Not on file    Intimate partner violence:     Fear of current or ex partner: Not on file     Emotionally abused: Not on file     Physically abused: Not on file     Forced sexual activity: Not on file   Other Topics Concern    Not on file   Social History Narrative    Caffeine use    Graduated from high school    Lives with adult children    Lives with     Denied: History of High risk sexual behavior       Review of Systems   Constitutional: Negative for activity change, chills, fatigue and fever  HENT: Negative for congestion  Eyes: Negative for discharge  Respiratory: Negative for cough, chest tightness and shortness of breath  Cardiovascular: Negative for chest pain, palpitations and leg swelling  Gastrointestinal: Negative for abdominal pain  Genitourinary: Negative for difficulty urinating  Musculoskeletal: Negative for arthralgias and myalgias  Skin: Negative for rash  Allergic/Immunologic: Negative for immunocompromised state  Neurological: Positive for dizziness  Negative for syncope, weakness, light-headedness and headaches  Hematological: Negative for adenopathy  Does not bruise/bleed easily  Psychiatric/Behavioral: Negative for dysphoric mood and sleep disturbance  The patient is not nervous/anxious  Objective:  Vitals:    12/24/19 0947   BP: (!) 172/72   BP Location: Left arm   Patient Position: Sitting   Cuff Size: Adult   Pulse: 90   Resp: 18   SpO2: 93%   Weight: 49 kg (108 lb)   Height: 5' 2" (1 575 m)     Body mass index is 19 75 kg/m²  Physical Exam   Constitutional: She is oriented to person, place, and time  She appears well-developed and well-nourished  No distress  Thin elderly female in no acute distress  HENT:   Head: Normocephalic  Eyes: Pupils are equal, round, and reactive to light  Neck: Neck supple  No JVD present  Carotid bruit is not present  No thyromegaly present  Cardiovascular: Normal rate, regular rhythm and normal heart sounds  Pulmonary/Chest: Effort normal and breath sounds normal    Musculoskeletal: She exhibits no edema  Lymphadenopathy:     She has no cervical adenopathy  Neurological: She is alert and oriented to person, place, and time   She displays normal reflexes  No cranial nerve deficit or sensory deficit  She exhibits normal muscle tone  Coordination normal    Psychiatric: She has a normal mood and affect  Her behavior is normal    Nursing note and vitals reviewed  RESULTS:    Recent Results (from the past 1008 hour(s))   Basic metabolic panel    Collection Time: 12/24/19 12:05 AM   Result Value Ref Range    Sodium 141 136 - 145 mmol/L    Potassium 4 5 3 5 - 5 3 mmol/L    Chloride 105 100 - 108 mmol/L    CO2 30 21 - 32 mmol/L    ANION GAP 6 4 - 13 mmol/L    BUN 26 (H) 5 - 25 mg/dL    Creatinine 0 72 0 60 - 1 30 mg/dL    Glucose 98 65 - 140 mg/dL    Calcium 9 0 8 3 - 10 1 mg/dL    eGFR 77 ml/min/1 73sq m   CBC and differential    Collection Time: 12/24/19 12:05 AM   Result Value Ref Range    WBC 8 25 4 31 - 10 16 Thousand/uL    RBC 3 94 3 81 - 5 12 Million/uL    Hemoglobin 11 8 11 5 - 15 4 g/dL    Hematocrit 37 1 34 8 - 46 1 %    MCV 94 82 - 98 fL    MCH 29 9 26 8 - 34 3 pg    MCHC 31 8 31 4 - 37 4 g/dL    RDW 13 2 11 6 - 15 1 %    MPV 11 8 8 9 - 12 7 fL    Platelets 517 233 - 933 Thousands/uL    nRBC 0 /100 WBCs    Neutrophils Relative 51 43 - 75 %    Immat GRANS % 0 0 - 2 %    Lymphocytes Relative 36 14 - 44 %    Monocytes Relative 10 4 - 12 %    Eosinophils Relative 2 0 - 6 %    Basophils Relative 1 0 - 1 %    Neutrophils Absolute 4 19 1 85 - 7 62 Thousands/µL    Immature Grans Absolute 0 02 0 00 - 0 20 Thousand/uL    Lymphocytes Absolute 2 98 0 60 - 4 47 Thousands/µL    Monocytes Absolute 0 80 0 17 - 1 22 Thousand/µL    Eosinophils Absolute 0 20 0 00 - 0 61 Thousand/µL    Basophils Absolute 0 06 0 00 - 0 10 Thousands/µL   Troponin I    Collection Time: 12/24/19 12:05 AM   Result Value Ref Range    Troponin I <0 02 <=0 04 ng/mL       This note was created with voice recognition software  Phonic, grammatical and spelling errors may be present within the note as a result

## 2019-12-24 NOTE — PATIENT INSTRUCTIONS
Patient will start amlodipine 2 5 mg daily or 1/2 of a 5 mg tablet daily  Patient will try to take it easy over the next week  Also advised patient to start a baby aspirin daily  Also would like patient when they get to [place] to make an appointment with neurologist for evaluation

## 2019-12-31 ENCOUNTER — OFFICE VISIT (OUTPATIENT)
Dept: INTERNAL MEDICINE CLINIC | Facility: CLINIC | Age: 84
End: 2019-12-31
Payer: COMMERCIAL

## 2019-12-31 VITALS
BODY MASS INDEX: 20.43 KG/M2 | RESPIRATION RATE: 18 BRPM | WEIGHT: 111 LBS | DIASTOLIC BLOOD PRESSURE: 68 MMHG | HEIGHT: 62 IN | HEART RATE: 88 BPM | SYSTOLIC BLOOD PRESSURE: 136 MMHG

## 2019-12-31 DIAGNOSIS — I10 ESSENTIAL HYPERTENSION: Primary | ICD-10-CM

## 2019-12-31 PROCEDURE — 3075F SYST BP GE 130 - 139MM HG: CPT | Performed by: PHYSICIAN ASSISTANT

## 2019-12-31 PROCEDURE — 3078F DIAST BP <80 MM HG: CPT | Performed by: PHYSICIAN ASSISTANT

## 2019-12-31 PROCEDURE — 99213 OFFICE O/P EST LOW 20 MIN: CPT | Performed by: PHYSICIAN ASSISTANT

## 2019-12-31 PROCEDURE — 1160F RVW MEDS BY RX/DR IN RCRD: CPT | Performed by: PHYSICIAN ASSISTANT

## 2019-12-31 RX ORDER — AMLODIPINE BESYLATE 2.5 MG/1
5 TABLET ORAL DAILY
Qty: 90 TABLET | Refills: 1 | Status: SHIPPED | OUTPATIENT
Start: 2019-12-31 | End: 2020-01-09 | Stop reason: SINTOL

## 2019-12-31 NOTE — PATIENT INSTRUCTIONS
Blood pressure much better controlled on low-dose amlodipine 2 5 mg daily  Continue this medication  Would advise follow-up with health care provider while your in [place]  Follow-up here when he returned back  Continue your baby aspirin

## 2019-12-31 NOTE — PROGRESS NOTES
Assessment/Plan:      Quality Measures:       Return for When returns from [place]  Diagnoses and all orders for this visit:    Essential hypertension  -     amLODIPine (NORVASC) 2 5 mg tablet; Take 2 tablets (5 mg total) by mouth daily          Subjective:      Patient ID: Ghislaine Marshall is a 80 y o  female  Follow-up    Hypertension:  On low-dose amlodipine 2 5 mg daily  Denies any side effects at this point  No lightheadedness  Delores cp, palp, sob, edema, HA, dizziness, diaphoresis, syncope, visual disturbance  BP much better controlled today  I had a discussion with the patient today about hyperlipidemia and her most recent findings on her CTA of her brain and neck  She has started a baby aspirin daily for me  I discussed prophylactic use of statin therapy also  She is not interested in doing so at this time  She will however consider this  ALLERGIES:  Allergies   Allergen Reactions    Latex     Molds & Smuts     Penicillins        CURRENT MEDICATIONS:    Current Outpatient Medications:     triamcinolone (KENALOG) 0 5 % cream, Apply topically 3 (three) times a day (Patient taking differently: Apply topically as needed ), Disp: 30 g, Rfl: 2    amLODIPine (NORVASC) 2 5 mg tablet, Take 2 tablets (5 mg total) by mouth daily, Disp: 90 tablet, Rfl: 1    ACTIVE PROBLEM LIST:  Patient Active Problem List   Diagnosis    Allergic rhinitis    Chronic insomnia    Hypothyroidism, adult    TIA (transient ischemic attack)    Left carotid bruit    Hyperlipidemia    Dry skin dermatitis    Osteoporosis    Essential hypertension       PAST MEDICAL HISTORY:  History reviewed  No pertinent past medical history      PAST SURGICAL HISTORY:  Past Surgical History:   Procedure Laterality Date    FOOT SURGERY  2007       FAMILY HISTORY:  Family History   Problem Relation Age of Onset    Parkinsonism Mother     Diabetes Father     Hyperlipidemia Father     Hypertension Father    [facility] Other Father         cardiac Disorder       SOCIAL HISTORY:  Social History     Socioeconomic History    Marital status: /Civil Union     Spouse name: Not on file    Number of children: 2    Years of education: Not on file    Highest education level: Not on file   Occupational History    Occupation: Retired   Social Needs    Financial resource strain: Not on file    Food insecurity:     Worry: Not on file     Inability: Not on file   [employer] needs:     Medical: Not on file     Non-medical: Not on file   Tobacco Use    Smoking status: Former Smoker    Smokeless tobacco: Never Used    Tobacco comment: Quit smoking at 21years of age   Substance and Sexual Activity    Alcohol use: Yes     Comment: occasional    Drug use: No    Sexual activity: Never     Partners: Male   Lifestyle    Physical activity:     Days per week: Not on file     Minutes per session: Not on file    Stress: Not on file   Relationships    Social connections:     Talks on phone: Not on file     Gets together: Not on file     Attends [employer] service: Not on file     Active member of club or organization: Not on file     Attends meetings of clubs or organizations: Not on file     Relationship status: Not on file    Intimate partner violence:     Fear of current or ex partner: Not on file     Emotionally abused: Not on file     Physically abused: Not on file     Forced sexual activity: Not on file   Other Topics Concern    Not on file   Social History Narrative    Caffeine use    Graduated from high school    Lives with adult children    Lives with     Denied: History of High risk sexual behavior       Review of Systems   Constitutional: Negative for activity change, chills, fatigue and fever  HENT: Negative for congestion  Eyes: Negative for discharge  Respiratory: Negative for cough, chest tightness and shortness of breath  Cardiovascular: Negative for chest pain, palpitations and leg swelling  Musculoskeletal: Negative for arthralgias and myalgias  Skin: Negative for rash  Allergic/Immunologic: Negative for immunocompromised state  Neurological: Negative for dizziness, syncope, weakness, light-headedness and headaches  Hematological: Negative for adenopathy  Does not bruise/bleed easily  Psychiatric/Behavioral: Negative for dysphoric mood and sleep disturbance  The patient is not nervous/anxious  Objective:  Vitals:    12/31/19 1000   BP: 162/72   BP Location: Left arm   Patient Position: Sitting   Cuff Size: Adult   Pulse: 88   Resp: 18   Weight: 50 3 kg (111 lb)   Height: 5' 2" (1 575 m)     Body mass index is 20 3 kg/m²  Physical Exam   Constitutional: She is oriented to person, place, and time  She appears well-developed and well-nourished  No distress  HENT:   Head: Normocephalic  Neck: Neck supple  Carotid bruit is present (Soft left carotid bruit, CTA results are present in the EMR)  Cardiovascular: Normal rate, regular rhythm and normal heart sounds  Pulmonary/Chest: Effort normal and breath sounds normal    Musculoskeletal: She exhibits no edema  Lymphadenopathy:     She has no cervical adenopathy  Neurological: She is alert and oriented to person, place, and time  Skin: Skin is warm and dry  Psychiatric: She has a normal mood and affect  Her behavior is normal    Nursing note and vitals reviewed          RESULTS:    Recent Results (from the past 1008 hour(s))   ECG 12 lead    Collection Time: 12/23/19 11:50 PM   Result Value Ref Range    Ventricular Rate 73 BPM    Atrial Rate 73 BPM    NH Interval 174 ms    QRSD Interval 88 ms    QT Interval 412 ms    QTC Interval 453 ms    P Axis 38 degrees    QRS Axis -13 degrees    T Wave Axis 10 degrees   Basic metabolic panel    Collection Time: 12/24/19 12:05 AM   Result Value Ref Range    Sodium 141 136 - 145 mmol/L    Potassium 4 5 3 5 - 5 3 mmol/L    Chloride 105 100 - 108 mmol/L    CO2 30 21 - 32 mmol/L    ANION GAP 6 4 - 13 mmol/L    BUN 26 (H) 5 - 25 mg/dL    Creatinine 0 72 0 60 - 1 30 mg/dL    Glucose 98 65 - 140 mg/dL    Calcium 9 0 8 3 - 10 1 mg/dL    eGFR 77 ml/min/1 73sq m   CBC and differential    Collection Time: 12/24/19 12:05 AM   Result Value Ref Range    WBC 8 25 4 31 - 10 16 Thousand/uL    RBC 3 94 3 81 - 5 12 Million/uL    Hemoglobin 11 8 11 5 - 15 4 g/dL    Hematocrit 37 1 34 8 - 46 1 %    MCV 94 82 - 98 fL    MCH 29 9 26 8 - 34 3 pg    MCHC 31 8 31 4 - 37 4 g/dL    RDW 13 2 11 6 - 15 1 %    MPV 11 8 8 9 - 12 7 fL    Platelets 322 235 - 427 Thousands/uL    nRBC 0 /100 WBCs    Neutrophils Relative 51 43 - 75 %    Immat GRANS % 0 0 - 2 %    Lymphocytes Relative 36 14 - 44 %    Monocytes Relative 10 4 - 12 %    Eosinophils Relative 2 0 - 6 %    Basophils Relative 1 0 - 1 %    Neutrophils Absolute 4 19 1 85 - 7 62 Thousands/µL    Immature Grans Absolute 0 02 0 00 - 0 20 Thousand/uL    Lymphocytes Absolute 2 98 0 60 - 4 47 Thousands/µL    Monocytes Absolute 0 80 0 17 - 1 22 Thousand/µL    Eosinophils Absolute 0 20 0 00 - 0 61 Thousand/µL    Basophils Absolute 0 06 0 00 - 0 10 Thousands/µL   Troponin I    Collection Time: 12/24/19 12:05 AM   Result Value Ref Range    Troponin I <0 02 <=0 04 ng/mL       This note was created with voice recognition software  Phonic, grammatical and spelling errors may be present within the note as a result

## 2020-01-08 ENCOUNTER — TELEPHONE (OUTPATIENT)
Dept: INTERNAL MEDICINE CLINIC | Facility: CLINIC | Age: 85
End: 2020-01-08

## 2020-01-08 NOTE — TELEPHONE ENCOUNTER
Patient is taking the amlodipine for about a week  She is feeling very fatigued and has no energy  She would like to if she could try something else? Her  is on Lisinprol and wanted to know about trying that  She took her BP this morning and it was 141/70  Please advise        Call back #[phone]

## 2020-01-09 ENCOUNTER — OFFICE VISIT (OUTPATIENT)
Dept: INTERNAL MEDICINE CLINIC | Facility: CLINIC | Age: 85
End: 2020-01-09
Payer: COMMERCIAL

## 2020-01-09 VITALS
RESPIRATION RATE: 16 BRPM | HEART RATE: 89 BPM | BODY MASS INDEX: 20.43 KG/M2 | WEIGHT: 111 LBS | HEIGHT: 62 IN | OXYGEN SATURATION: 99 % | DIASTOLIC BLOOD PRESSURE: 70 MMHG | SYSTOLIC BLOOD PRESSURE: 150 MMHG

## 2020-01-09 DIAGNOSIS — I10 ESSENTIAL HYPERTENSION: Primary | ICD-10-CM

## 2020-01-09 PROCEDURE — 99213 OFFICE O/P EST LOW 20 MIN: CPT | Performed by: INTERNAL MEDICINE

## 2020-01-09 PROCEDURE — 1160F RVW MEDS BY RX/DR IN RCRD: CPT | Performed by: INTERNAL MEDICINE

## 2020-01-09 RX ORDER — LOSARTAN POTASSIUM 25 MG/1
25 TABLET ORAL DAILY
Qty: 90 TABLET | Refills: 1 | Status: SHIPPED | OUTPATIENT
Start: 2020-01-09 | End: 2020-06-15

## 2020-01-09 NOTE — PROGRESS NOTES
Assessment/Plan:     Will switch medication because of the side effects of the present medicine  Reviewed the new medicine with them  They will be heading to [place] next week  They do have an internist down there that they can follow up with in about 2-3 weeks  We can resume follow-up in the spring when they return  Quality Measures:       No follow-ups on file  No problem-specific Assessment & Plan notes found for this encounter  Diagnoses and all orders for this visit:    Essential hypertension  -     losartan (COZAAR) 25 mg tablet; Take 1 tablet (25 mg total) by mouth daily          Subjective:      Patient ID: Wolf Vidales is a 80 y o  female  Patient comes in today for follow-up of her blood pressure because she has had trouble with the present medicine  There reports she seemed to get anxious when her son was diagnosed with rheumatoid arthritis but even after that settled, her pressure has been bouncing around  She also feels like the medicine is giving her problem with fatigue and keeping her up at night  She would like to switch it  She understands that she needs to take blood pressure medicine at this point  ALLERGIES:  Allergies   Allergen Reactions    Latex     Molds & Smuts     Penicillins        CURRENT MEDICATIONS:    Current Outpatient Medications:     losartan (COZAAR) 25 mg tablet, Take 1 tablet (25 mg total) by mouth daily, Disp: 90 tablet, Rfl: 1    ACTIVE PROBLEM LIST:  Patient Active Problem List   Diagnosis    Allergic rhinitis    Chronic insomnia    Hypothyroidism, adult    TIA (transient ischemic attack)    Left carotid bruit    Hyperlipidemia    Dry skin dermatitis    Osteoporosis    Essential hypertension       PAST MEDICAL HISTORY:  No past medical history on file      PAST SURGICAL HISTORY:  Past Surgical History:   Procedure Laterality Date    FOOT SURGERY  2007       FAMILY HISTORY:  Family History   Problem Relation Age of Onset    Parkinsonism Mother     Diabetes Father     Hyperlipidemia Father     Hypertension Father     Other Father         cardiac Disorder       SOCIAL HISTORY:  Social History     Socioeconomic History    Marital status: /Civil Union     Spouse name: Not on file    Number of children: 2    Years of education: Not on file    Highest education level: Not on file   Occupational History    Occupation: Retired   Social Needs    Financial resource strain: Not on file    Food insecurity:     Worry: Not on file     Inability: Not on file   [employer] needs:     Medical: Not on file     Non-medical: Not on file   Tobacco Use    Smoking status: Former Smoker    Smokeless tobacco: Never Used    Tobacco comment: Quit smoking at 21years of age   Substance and Sexual Activity    Alcohol use: Yes     Comment: occasional    Drug use: No    Sexual activity: Never     Partners: Male   Lifestyle    Physical activity:     Days per week: Not on file     Minutes per session: Not on file    Stress: Not on file   Relationships    Social connections:     Talks on phone: Not on file     Gets together: Not on file     Attends [employer] service: Not on file     Active member of club or organization: Not on file     Attends meetings of clubs or organizations: Not on file     Relationship status: Not on file    Intimate partner violence:     Fear of current or ex partner: Not on file     Emotionally abused: Not on file     Physically abused: Not on file     Forced sexual activity: Not on file   Other Topics Concern    Not on file   Social History Narrative    Caffeine use    Graduated from high school    Lives with adult children    Lives with     Denied: History of High risk sexual behavior       Review of Systems   Respiratory: Negative for shortness of breath  Cardiovascular: Negative for chest pain  Gastrointestinal: Negative for abdominal pain           Objective:  Vitals:    01/09/20 1554   BP: 150/70   BP Location: Left arm   Patient Position: Sitting   Cuff Size: Standard   Pulse: 89   Resp: 16   SpO2: 99%   Weight: 50 3 kg (111 lb)   Height: 5' 2" (1 575 m)     Body mass index is 20 3 kg/m²  Physical Exam   Constitutional: She is oriented to person, place, and time  She appears well-developed and well-nourished  Cardiovascular: Normal rate, regular rhythm and normal heart sounds  Pulmonary/Chest: Effort normal and breath sounds normal    Abdominal: Soft  There is no tenderness  Neurological: She is alert and oriented to person, place, and time  Nursing note and vitals reviewed          RESULTS:    Recent Results (from the past 1008 hour(s))   ECG 12 lead    Collection Time: 12/23/19 11:50 PM   Result Value Ref Range    Ventricular Rate 73 BPM    Atrial Rate 73 BPM    NC Interval 174 ms    QRSD Interval 88 ms    QT Interval 412 ms    QTC Interval 453 ms    P Axis 38 degrees    QRS Axis -13 degrees    T Wave Axis 10 degrees   Basic metabolic panel    Collection Time: 12/24/19 12:05 AM   Result Value Ref Range    Sodium 141 136 - 145 mmol/L    Potassium 4 5 3 5 - 5 3 mmol/L    Chloride 105 100 - 108 mmol/L    CO2 30 21 - 32 mmol/L    ANION GAP 6 4 - 13 mmol/L    BUN 26 (H) 5 - 25 mg/dL    Creatinine 0 72 0 60 - 1 30 mg/dL    Glucose 98 65 - 140 mg/dL    Calcium 9 0 8 3 - 10 1 mg/dL    eGFR 77 ml/min/1 73sq m   CBC and differential    Collection Time: 12/24/19 12:05 AM   Result Value Ref Range    WBC 8 25 4 31 - 10 16 Thousand/uL    RBC 3 94 3 81 - 5 12 Million/uL    Hemoglobin 11 8 11 5 - 15 4 g/dL    Hematocrit 37 1 34 8 - 46 1 %    MCV 94 82 - 98 fL    MCH 29 9 26 8 - 34 3 pg    MCHC 31 8 31 4 - 37 4 g/dL    RDW 13 2 11 6 - 15 1 %    MPV 11 8 8 9 - 12 7 fL    Platelets 700 632 - 846 Thousands/uL    nRBC 0 /100 WBCs    Neutrophils Relative 51 43 - 75 %    Immat GRANS % 0 0 - 2 %    Lymphocytes Relative 36 14 - 44 %    Monocytes Relative 10 4 - 12 %    Eosinophils Relative 2 0 - 6 %    Basophils Relative 1 0 - 1 %    Neutrophils Absolute 4 19 1 85 - 7 62 Thousands/µL    Immature Grans Absolute 0 02 0 00 - 0 20 Thousand/uL    Lymphocytes Absolute 2 98 0 60 - 4 47 Thousands/µL    Monocytes Absolute 0 80 0 17 - 1 22 Thousand/µL    Eosinophils Absolute 0 20 0 00 - 0 61 Thousand/µL    Basophils Absolute 0 06 0 00 - 0 10 Thousands/µL   Troponin I    Collection Time: 12/24/19 12:05 AM   Result Value Ref Range    Troponin I <0 02 <=0 04 ng/mL       This note was created with voice recognition software  Phonic, grammatical and spelling errors may be present within the note as a result

## 2020-01-27 ENCOUNTER — TELEPHONE (OUTPATIENT)
Dept: INTERNAL MEDICINE CLINIC | Facility: CLINIC | Age: 85
End: 2020-01-27

## 2020-01-27 NOTE — TELEPHONE ENCOUNTER
Patient's BP is still running high and she said that she really doesn't feel right  Patient wants to know if you want to up her Losartan or what would be your recommendation  Patient is in [place] and was instructed if she felt really bad to go to the ER  BP runnin/95 yesterday and today 180/95      Best contact # [phone]

## 2020-01-28 NOTE — TELEPHONE ENCOUNTER
Patient has an appointment on Thursday, I did advise for her to go to the ER to get testing if symptoms persist/worsen

## 2020-04-28 ENCOUNTER — TELEPHONE (OUTPATIENT)
Dept: INTERNAL MEDICINE CLINIC | Facility: CLINIC | Age: 85
End: 2020-04-28

## 2020-06-15 DIAGNOSIS — I10 ESSENTIAL HYPERTENSION: ICD-10-CM

## 2020-06-15 RX ORDER — LOSARTAN POTASSIUM 25 MG/1
TABLET ORAL
Qty: 90 TABLET | Refills: 1 | Status: SHIPPED | OUTPATIENT
Start: 2020-06-15 | End: 2020-06-18 | Stop reason: SDUPTHER

## 2020-06-18 DIAGNOSIS — I10 ESSENTIAL HYPERTENSION: ICD-10-CM

## 2020-06-18 RX ORDER — LOSARTAN POTASSIUM 25 MG/1
25 TABLET ORAL 2 TIMES DAILY
Qty: 180 TABLET | Refills: 1 | Status: SHIPPED | OUTPATIENT
Start: 2020-06-18 | End: 2020-10-02 | Stop reason: SDUPTHER

## 2020-07-14 ENCOUNTER — OFFICE VISIT (OUTPATIENT)
Dept: INTERNAL MEDICINE CLINIC | Facility: CLINIC | Age: 85
End: 2020-07-14
Payer: COMMERCIAL

## 2020-07-14 VITALS
DIASTOLIC BLOOD PRESSURE: 80 MMHG | HEART RATE: 70 BPM | RESPIRATION RATE: 16 BRPM | OXYGEN SATURATION: 94 % | TEMPERATURE: 98.3 F | HEIGHT: 62 IN | SYSTOLIC BLOOD PRESSURE: 148 MMHG | BODY MASS INDEX: 19.14 KG/M2 | WEIGHT: 104 LBS

## 2020-07-14 DIAGNOSIS — I10 ESSENTIAL HYPERTENSION: Primary | ICD-10-CM

## 2020-07-14 DIAGNOSIS — E03.9 HYPOTHYROIDISM, ADULT: ICD-10-CM

## 2020-07-14 DIAGNOSIS — E78.5 HYPERLIPIDEMIA, UNSPECIFIED HYPERLIPIDEMIA TYPE: ICD-10-CM

## 2020-07-14 PROCEDURE — 3008F BODY MASS INDEX DOCD: CPT | Performed by: PHYSICIAN ASSISTANT

## 2020-07-14 PROCEDURE — 1160F RVW MEDS BY RX/DR IN RCRD: CPT | Performed by: PHYSICIAN ASSISTANT

## 2020-07-14 PROCEDURE — 3079F DIAST BP 80-89 MM HG: CPT | Performed by: PHYSICIAN ASSISTANT

## 2020-07-14 PROCEDURE — 3077F SYST BP >= 140 MM HG: CPT | Performed by: PHYSICIAN ASSISTANT

## 2020-07-14 PROCEDURE — 99214 OFFICE O/P EST MOD 30 MIN: CPT | Performed by: PHYSICIAN ASSISTANT

## 2020-07-14 PROCEDURE — 1036F TOBACCO NON-USER: CPT | Performed by: PHYSICIAN ASSISTANT

## 2020-07-14 PROCEDURE — 4040F PNEUMOC VAC/ADMIN/RCVD: CPT | Performed by: PHYSICIAN ASSISTANT

## 2020-07-14 NOTE — PROGRESS NOTES
Assessment/Plan:      Quality Measures:       Return in about 4 weeks (around 8/11/2020) for Regular Visit + Medicare Annual Wellness  Diagnoses and all orders for this visit:    Essential hypertension  -     CBC and differential; Future  -     Comprehensive metabolic panel; Future    Hyperlipidemia, unspecified hyperlipidemia type  -     Lipid panel; Future    Hypothyroidism, adult  -     T4, free; Future  -     TSH, 3rd generation; Future          Subjective:      Patient ID: Sigrid Osler is a 80 y o  female  Follow-up    Hypertension:  Patient has been on losartan 25 mg twice a day for approximately 6 weeks  She has been monitoring her home blood pressure which still fluctuates and the systolic level is still elevated  Delores cp, palp, sob, edema, HA, dizziness, diaphoresis, syncope, visual disturbance  Patient states her cuff is only approximately 3year-old  Patient has recently relocated from [place] where she and her  generally winter every year  Patient notes that her memory is not as good as it has been and she is concerned that it is worsening  Hyperlipidemia: This has been in her past however she has continued to refuse any medication  She is willing to have her levels checked again  Hypothyroidism:  Patient has a past history of this, is not on any thyroid replacement hormone  Will recheck her levels        ALLERGIES:  Allergies   Allergen Reactions    Latex     Molds & Smuts     Penicillins        CURRENT MEDICATIONS:    Current Outpatient Medications:     losartan (COZAAR) 25 mg tablet, Take 1 tablet (25 mg total) by mouth 2 (two) times a day, Disp: 180 tablet, Rfl: 1    ACTIVE PROBLEM LIST:  Patient Active Problem List   Diagnosis    Allergic rhinitis    Chronic insomnia    Hypothyroidism, adult    TIA (transient ischemic attack)    Left carotid bruit    Hyperlipidemia    Dry skin dermatitis    Osteoporosis    Essential hypertension       PAST MEDICAL HISTORY:  History reviewed  No pertinent past medical history      PAST SURGICAL HISTORY:  Past Surgical History:   Procedure Laterality Date    FOOT SURGERY  2007       FAMILY HISTORY:  Family History   Problem Relation Age of Onset   Will Daubs Parkinsonism Mother     Diabetes Father     Hyperlipidemia Father     Hypertension Father     Other Father         cardiac Disorder       SOCIAL HISTORY:  Social History     Socioeconomic History    Marital status: /Civil Union     Spouse name: Not on file    Number of children: 2    Years of education: Not on file    Highest education level: Not on file   Occupational History    Occupation: Retired   Social Needs    Financial resource strain: Not on file    Food insecurity:     Worry: Not on file     Inability: Not on file   [employer] needs:     Medical: Not on file     Non-medical: Not on file   Tobacco Use    Smoking status: Former Smoker    Smokeless tobacco: Never Used    Tobacco comment: Quit smoking at 21years of age   Substance and Sexual Activity    Alcohol use: Yes     Comment: occasional    Drug use: No    Sexual activity: Never     Partners: Male   Lifestyle    Physical activity:     Days per week: Not on file     Minutes per session: Not on file    Stress: Not on file   Relationships    Social connections:     Talks on phone: Not on file     Gets together: Not on file     Attends [employer] service: Not on file     Active member of club or organization: Not on file     Attends meetings of clubs or organizations: Not on file     Relationship status: Not on file    Intimate partner violence:     Fear of current or ex partner: Not on file     Emotionally abused: Not on file     Physically abused: Not on file     Forced sexual activity: Not on file   Other Topics Concern    Not on file   Social History Narrative    Caffeine use    Graduated from high school    Lives with adult children    Lives with     Denied: History of High risk sexual behavior       Review of Systems   Constitutional: Negative for activity change, chills, fatigue and fever  HENT: Negative for congestion  Eyes: Negative for discharge and visual disturbance  Respiratory: Negative for cough, chest tightness and shortness of breath  Cardiovascular: Negative for chest pain, palpitations and leg swelling  Gastrointestinal: Negative for abdominal pain, constipation and diarrhea  Endocrine: Negative for polydipsia, polyphagia and polyuria  Genitourinary: Negative for difficulty urinating  Musculoskeletal: Negative for arthralgias and myalgias  Skin: Negative for rash  Allergic/Immunologic: Negative for immunocompromised state  Neurological: Negative for dizziness, syncope, weakness, light-headedness and headaches  Hematological: Negative for adenopathy  Does not bruise/bleed easily  Psychiatric/Behavioral: Negative for dysphoric mood, sleep disturbance and suicidal ideas  The patient is not nervous/anxious  Objective:  Vitals:    07/14/20 1210   BP: 148/90   BP Location: Right arm   Patient Position: Sitting   Cuff Size: Standard   Pulse: 70   Resp: 16   Temp: 98 3 °F (36 8 °C)   TempSrc: Tympanic   SpO2: 94%   Weight: 47 2 kg (104 lb)   Height: 5' 2" (1 575 m)     Body mass index is 19 02 kg/m²  Physical Exam   Constitutional: She is oriented to person, place, and time  She appears well-developed and well-nourished  No distress  HENT:   Head: Normocephalic and atraumatic  Eyes: Pupils are equal, round, and reactive to light  Neck: Neck supple  No JVD present  Carotid bruit is present (Left carotid bruit present)  Cardiovascular: Normal rate, regular rhythm and normal heart sounds  Pulmonary/Chest: Effort normal and breath sounds normal  No respiratory distress  Musculoskeletal: She exhibits no edema  Lymphadenopathy:     She has no cervical adenopathy  Neurological: She is alert and oriented to person, place, and time  Skin: Skin is warm and dry  No rash noted  Psychiatric: She has a normal mood and affect  Her behavior is normal  Cognition and memory are impaired  Nursing note and vitals reviewed  RESULTS:    No results found for this or any previous visit (from the past 1008 hour(s))  This note was created with voice recognition software  Phonic, grammatical and spelling errors may be present within the note as a result

## 2020-07-14 NOTE — PATIENT INSTRUCTIONS
We will increase are losartan to 2 tablets in the morning (50 mg) and 1 tablet in the evening  We will recheck blood pressure in 1 month with Medicare annual wellness visit  I would like patient to do blood work prior to that visit  Follow-up sooner for any problems  Bring your blood pressure cuff to your follow-up visit  Start taking 1 baby aspirin in the morning daily

## 2020-07-17 DIAGNOSIS — R41.3 MEMORY LOSS: Primary | ICD-10-CM

## 2020-07-20 ENCOUNTER — TELEPHONE (OUTPATIENT)
Dept: INTERNAL MEDICINE CLINIC | Facility: CLINIC | Age: 85
End: 2020-07-20

## 2020-07-20 NOTE — TELEPHONE ENCOUNTER
Patient was to let Brandon Heard know how her BP was runnin/83 and patient is taking 50 mg of Losartan  Please advise    Allison Garcia

## 2020-07-23 ENCOUNTER — CONSULT (OUTPATIENT)
Dept: NEUROLOGY | Facility: CLINIC | Age: 85
End: 2020-07-23
Payer: COMMERCIAL

## 2020-07-23 VITALS
WEIGHT: 106.4 LBS | BODY MASS INDEX: 19.58 KG/M2 | DIASTOLIC BLOOD PRESSURE: 86 MMHG | HEART RATE: 80 BPM | SYSTOLIC BLOOD PRESSURE: 170 MMHG | TEMPERATURE: 97.9 F | HEIGHT: 62 IN | RESPIRATION RATE: 14 BRPM | OXYGEN SATURATION: 97 %

## 2020-07-23 DIAGNOSIS — E78.5 HYPERLIPIDEMIA, UNSPECIFIED HYPERLIPIDEMIA TYPE: ICD-10-CM

## 2020-07-23 DIAGNOSIS — I10 ESSENTIAL HYPERTENSION: ICD-10-CM

## 2020-07-23 DIAGNOSIS — R41.3 MEMORY DIFFICULTY: ICD-10-CM

## 2020-07-23 PROCEDURE — 1160F RVW MEDS BY RX/DR IN RCRD: CPT | Performed by: PSYCHIATRY & NEUROLOGY

## 2020-07-23 PROCEDURE — 4040F PNEUMOC VAC/ADMIN/RCVD: CPT | Performed by: PSYCHIATRY & NEUROLOGY

## 2020-07-23 PROCEDURE — 99215 OFFICE O/P EST HI 40 MIN: CPT | Performed by: PSYCHIATRY & NEUROLOGY

## 2020-07-23 PROCEDURE — 3079F DIAST BP 80-89 MM HG: CPT | Performed by: PSYCHIATRY & NEUROLOGY

## 2020-07-23 PROCEDURE — 3008F BODY MASS INDEX DOCD: CPT | Performed by: PSYCHIATRY & NEUROLOGY

## 2020-07-23 PROCEDURE — 1036F TOBACCO NON-USER: CPT | Performed by: PSYCHIATRY & NEUROLOGY

## 2020-07-23 PROCEDURE — 3077F SYST BP >= 140 MM HG: CPT | Performed by: PSYCHIATRY & NEUROLOGY

## 2020-07-23 RX ORDER — RIVASTIGMINE 4.6 MG/24H
1 PATCH, EXTENDED RELEASE TRANSDERMAL DAILY
Qty: 30 PATCH | Refills: 0 | Status: SHIPPED | OUTPATIENT
Start: 2020-07-23 | End: 2020-08-13 | Stop reason: ALTCHOICE

## 2020-07-23 RX ORDER — RIVASTIGMINE 9.5 MG/24H
1 PATCH, EXTENDED RELEASE TRANSDERMAL DAILY
Qty: 30 PATCH | Refills: 5 | Status: SHIPPED | OUTPATIENT
Start: 2020-07-23 | End: 2020-08-13 | Stop reason: ALTCHOICE

## 2020-07-23 NOTE — PROGRESS NOTES
Danika Mcneal is a 80 y o  female  Chief Complaint   Patient presents with    Memory Loss     Memory is worst       Assessment:  1  Memory difficulty    2  Hyperlipidemia, unspecified hyperlipidemia type    3  Essential hypertension          Discussion:  Patient has mild cognitive impairment and  Dementia, would recommend an MRI scan of the brain with neuro quant and blood work, patient to call me after the above test to discuss the results, we discussed different medications including Exelon patch, she is agreeable, side effects of the medication discussed with the patient in detail, she was advised to go on 4 6 mg per 24 hour for 1 month and if she is tolerating it well to increase it to 9 5 mg, if she experiences any side effects then she will call me and stop the medication, she was also advised mentally stimulating exercises, to take fall and safety precautions, avoid driving and to be under constant supervision, and remain physically active      Patient was advised to take a baby aspirin given her history of prior TIA and stroke education given to the patient, her blood pressure is elevated in the office she was advised to follow up with family physician and monitor that and keep it under control, to keep blood pressure cholesterol and sugar under control, to go to the hospital if has any worsening symptoms and call me otherwise to see me back in 2 months and follow up with her other physicians,    Subjective:    HPI   Patient is here accompanied with her  for evaluation of memory difficulty, she had seen my associate Dr Dinah Chávez in the past for history of TIA and mild polyneuropathy in 2017, patient says that she has been having issues with her memory especially short-term, according to the  she would ask the same question and then will forget the answer, sometimes she has difficulty in remembering names, she is able to do her ADLs, no difficulty with cooking, she is able to drive without any issues but the  is usually with her, she denies having any headaches or dizziness, no confusion, no history of seizures, no motor or sensory symptoms in upper or lower extremity, no falls, no bowel and bladder incontinence, no other neurological complaints  Vitals:    07/23/20 0842   BP: 170/86   BP Location: Left arm   Patient Position: Sitting   Cuff Size: Standard   Pulse: 80   Resp: 14   Temp: 97 9 °F (36 6 °C)   Weight: 48 3 kg (106 lb 6 4 oz)   Height: 5' 2" (1 575 m)       Current Medications    Current Outpatient Medications:     losartan (COZAAR) 25 mg tablet, Take 1 tablet (25 mg total) by mouth 2 (two) times a day (Patient taking differently: Take by mouth 2 (two) times a day 50 mg in the morning and 25 mg at night), Disp: 180 tablet, Rfl: 1      Allergies  Latex; Molds & smuts; and Penicillins    Past Medical History  History reviewed  No pertinent past medical history  Past Surgical History:  Past Surgical History:   Procedure Laterality Date    FOOT SURGERY  2007         Family History:  Family History   Problem Relation Age of Onset   [facility] Parkinsonism Mother     Diabetes Father     Hyperlipidemia Father     Hypertension Father     Other Father         cardiac Disorder       Social History:   reports that she has quit smoking  She has never used smokeless tobacco  She reports that she drinks alcohol  She reports that she does not use drugs  I have reviewed the past medical history, surgical history, social and family history, current medications, allergies vitals, review of systems, and updated this information as appropriate today  Objective:    Physical Exam    Neurological Exam    GENERAL:  Cooperative in no acute distress  Well-developed and well-nourished    HEAD and NECK   Head is atraumatic normocephalic with no lesions or masses  Neck is supple with full range of motion    CARDIOVASCULAR  Carotid Arteries-no carotid bruits      NEUROLOGIC:  Mental Status-the patient is awake alert and oriented without aphasia or apraxia,[place] is 19/30  Cranial Nerves: Visual fields are full to confrontation  Visual acuity is 20/20 with hand-held chart  Extraocular movements are full without nystagmus  Pupils are 2-1/2 mm and reactive  Face is symmetrical to light touch  Movements of facial expression move symmetrically  Hearing is normal to finger rub bilaterally  Soft palate lifts symmetrically  Shoulder shrug is symmetrical  Tongue is midline without atrophy  Patient did remove the mask for the exam  Motor: No drift is noted on arm extension  Strength is full in the upper and lower extremities with normal bulk and tone  Sensory: Intact to temperature and vibratory sensation in the upper and lower extremities bilaterally  Cortical function is intact  Coordination: Finger to nose testing is performed accurately  Romberg is negative  Gait reveals a normal base with symmetrical arm swing  Tandem walk is normal   Reflexes:     2+ and symmetrical  Toes are downgoing          ROS:  Review of Systems   Constitutional: Negative  Negative for appetite change and fever  HENT: Negative  Negative for hearing loss, tinnitus, trouble swallowing and voice change  Eyes: Negative  Negative for photophobia and pain  Respiratory: Negative  Negative for shortness of breath  Cardiovascular: Negative  Negative for palpitations  Gastrointestinal: Negative  Negative for nausea and vomiting  Endocrine: Negative  Negative for cold intolerance  Genitourinary: Negative  Negative for dysuria, frequency and urgency  Musculoskeletal: Negative  Negative for myalgias and neck pain  Skin: Negative  Negative for rash  Allergic/Immunologic: Negative  Neurological: Negative  Negative for dizziness, tremors, seizures, syncope, facial asymmetry, speech difficulty, weakness, light-headedness, numbness and headaches  Hematological: Negative  Does not bruise/bleed easily  Psychiatric/Behavioral: Negative for confusion, hallucinations and sleep disturbance  Memory problems    All other systems reviewed and are negative

## 2020-07-24 ENCOUNTER — APPOINTMENT (OUTPATIENT)
Dept: LAB | Facility: HOSPITAL | Age: 85
End: 2020-07-24
Payer: COMMERCIAL

## 2020-07-24 DIAGNOSIS — E03.9 HYPOTHYROIDISM, ADULT: ICD-10-CM

## 2020-07-24 DIAGNOSIS — E78.5 HYPERLIPIDEMIA, UNSPECIFIED HYPERLIPIDEMIA TYPE: ICD-10-CM

## 2020-07-24 DIAGNOSIS — R41.3 MEMORY DIFFICULTY: ICD-10-CM

## 2020-07-24 DIAGNOSIS — I10 ESSENTIAL HYPERTENSION: ICD-10-CM

## 2020-07-24 LAB
ALBUMIN SERPL BCP-MCNC: 3.8 G/DL (ref 3.5–5)
ALP SERPL-CCNC: 78 U/L (ref 46–116)
ALT SERPL W P-5'-P-CCNC: 12 U/L (ref 12–78)
ANION GAP SERPL CALCULATED.3IONS-SCNC: 6 MMOL/L (ref 4–13)
AST SERPL W P-5'-P-CCNC: 17 U/L (ref 5–45)
BASOPHILS # BLD AUTO: 0.06 THOUSANDS/ΜL (ref 0–0.1)
BASOPHILS NFR BLD AUTO: 1 % (ref 0–1)
BILIRUB SERPL-MCNC: 0.4 MG/DL (ref 0.2–1)
BUN SERPL-MCNC: 16 MG/DL (ref 5–25)
CALCIUM SERPL-MCNC: 9 MG/DL (ref 8.3–10.1)
CHLORIDE SERPL-SCNC: 103 MMOL/L (ref 100–108)
CHOLEST SERPL-MCNC: 338 MG/DL (ref 50–200)
CO2 SERPL-SCNC: 30 MMOL/L (ref 21–32)
CREAT SERPL-MCNC: 0.71 MG/DL (ref 0.6–1.3)
EOSINOPHIL # BLD AUTO: 0.13 THOUSAND/ΜL (ref 0–0.61)
EOSINOPHIL NFR BLD AUTO: 2 % (ref 0–6)
ERYTHROCYTE [DISTWIDTH] IN BLOOD BY AUTOMATED COUNT: 13.2 % (ref 11.6–15.1)
FOLATE SERPL-MCNC: 10 NG/ML (ref 3.1–17.5)
GFR SERPL CREATININE-BSD FRML MDRD: 77 ML/MIN/1.73SQ M
GLUCOSE P FAST SERPL-MCNC: 94 MG/DL (ref 65–99)
HCT VFR BLD AUTO: 38.6 % (ref 34.8–46.1)
HDLC SERPL-MCNC: 85 MG/DL
HGB BLD-MCNC: 12.1 G/DL (ref 11.5–15.4)
IMM GRANULOCYTES # BLD AUTO: 0.01 THOUSAND/UL (ref 0–0.2)
IMM GRANULOCYTES NFR BLD AUTO: 0 % (ref 0–2)
LDLC SERPL CALC-MCNC: 237 MG/DL (ref 0–100)
LYMPHOCYTES # BLD AUTO: 2.19 THOUSANDS/ΜL (ref 0.6–4.47)
LYMPHOCYTES NFR BLD AUTO: 31 % (ref 14–44)
MCH RBC QN AUTO: 29.5 PG (ref 26.8–34.3)
MCHC RBC AUTO-ENTMCNC: 31.3 G/DL (ref 31.4–37.4)
MCV RBC AUTO: 94 FL (ref 82–98)
MONOCYTES # BLD AUTO: 0.47 THOUSAND/ΜL (ref 0.17–1.22)
MONOCYTES NFR BLD AUTO: 7 % (ref 4–12)
NEUTROPHILS # BLD AUTO: 4.31 THOUSANDS/ΜL (ref 1.85–7.62)
NEUTS SEG NFR BLD AUTO: 59 % (ref 43–75)
NONHDLC SERPL-MCNC: 253 MG/DL
NRBC BLD AUTO-RTO: 0 /100 WBCS
PLATELET # BLD AUTO: 223 THOUSANDS/UL (ref 149–390)
PMV BLD AUTO: 11.7 FL (ref 8.9–12.7)
POTASSIUM SERPL-SCNC: 4.4 MMOL/L (ref 3.5–5.3)
PROT SERPL-MCNC: 7.4 G/DL (ref 6.4–8.2)
RBC # BLD AUTO: 4.1 MILLION/UL (ref 3.81–5.12)
RPR SER QL: NORMAL
SODIUM SERPL-SCNC: 139 MMOL/L (ref 136–145)
T4 FREE SERPL-MCNC: 0.98 NG/DL (ref 0.76–1.46)
TRIGL SERPL-MCNC: 79 MG/DL
TSH SERPL DL<=0.05 MIU/L-ACNC: 3.52 UIU/ML (ref 0.36–3.74)
VIT B12 SERPL-MCNC: 768 PG/ML (ref 100–900)
WBC # BLD AUTO: 7.17 THOUSAND/UL (ref 4.31–10.16)

## 2020-07-24 PROCEDURE — 84439 ASSAY OF FREE THYROXINE: CPT

## 2020-07-24 PROCEDURE — 36415 COLL VENOUS BLD VENIPUNCTURE: CPT

## 2020-07-24 PROCEDURE — 82746 ASSAY OF FOLIC ACID SERUM: CPT

## 2020-07-24 PROCEDURE — 84443 ASSAY THYROID STIM HORMONE: CPT

## 2020-07-24 PROCEDURE — 80061 LIPID PANEL: CPT

## 2020-07-24 PROCEDURE — 86592 SYPHILIS TEST NON-TREP QUAL: CPT

## 2020-07-24 PROCEDURE — 86618 LYME DISEASE ANTIBODY: CPT

## 2020-07-24 PROCEDURE — 80053 COMPREHEN METABOLIC PANEL: CPT

## 2020-07-24 PROCEDURE — 82607 VITAMIN B-12: CPT

## 2020-07-24 PROCEDURE — 85025 COMPLETE CBC W/AUTO DIFF WBC: CPT

## 2020-07-25 LAB — B BURGDOR IGG+IGM SER-ACNC: <0.91 ISR (ref 0–0.9)

## 2020-08-13 ENCOUNTER — OFFICE VISIT (OUTPATIENT)
Dept: INTERNAL MEDICINE CLINIC | Facility: CLINIC | Age: 85
End: 2020-08-13
Payer: COMMERCIAL

## 2020-08-13 VITALS
TEMPERATURE: 98.7 F | WEIGHT: 107 LBS | HEIGHT: 62 IN | BODY MASS INDEX: 19.69 KG/M2 | HEART RATE: 96 BPM | DIASTOLIC BLOOD PRESSURE: 70 MMHG | OXYGEN SATURATION: 98 % | RESPIRATION RATE: 16 BRPM | SYSTOLIC BLOOD PRESSURE: 126 MMHG

## 2020-08-13 DIAGNOSIS — I10 ESSENTIAL HYPERTENSION: ICD-10-CM

## 2020-08-13 DIAGNOSIS — E78.2 MIXED HYPERLIPIDEMIA: ICD-10-CM

## 2020-08-13 DIAGNOSIS — Z00.00 ENCOUNTER FOR MEDICARE ANNUAL WELLNESS EXAM: Primary | ICD-10-CM

## 2020-08-13 PROCEDURE — 1160F RVW MEDS BY RX/DR IN RCRD: CPT | Performed by: PHYSICIAN ASSISTANT

## 2020-08-13 PROCEDURE — 99214 OFFICE O/P EST MOD 30 MIN: CPT | Performed by: PHYSICIAN ASSISTANT

## 2020-08-13 PROCEDURE — 1036F TOBACCO NON-USER: CPT | Performed by: PHYSICIAN ASSISTANT

## 2020-08-13 PROCEDURE — 3008F BODY MASS INDEX DOCD: CPT | Performed by: PHYSICIAN ASSISTANT

## 2020-08-13 PROCEDURE — 3074F SYST BP LT 130 MM HG: CPT | Performed by: PHYSICIAN ASSISTANT

## 2020-08-13 PROCEDURE — 4040F PNEUMOC VAC/ADMIN/RCVD: CPT | Performed by: PHYSICIAN ASSISTANT

## 2020-08-13 PROCEDURE — 3078F DIAST BP <80 MM HG: CPT | Performed by: PHYSICIAN ASSISTANT

## 2020-08-13 PROCEDURE — 1170F FXNL STATUS ASSESSED: CPT | Performed by: PHYSICIAN ASSISTANT

## 2020-08-13 PROCEDURE — 1125F AMNT PAIN NOTED PAIN PRSNT: CPT | Performed by: PHYSICIAN ASSISTANT

## 2020-08-13 PROCEDURE — G0439 PPPS, SUBSEQ VISIT: HCPCS | Performed by: PHYSICIAN ASSISTANT

## 2020-08-13 NOTE — PATIENT INSTRUCTIONS
No change in current medications  Will bring patient back in 1 month to reassess blood pressure  Medicare Preventive Visit Patient Instructions  Thank you for completing your Welcome to Medicare Visit or Medicare Annual Wellness Visit today  Your next wellness visit will be due in one year (8/13/2021)  The screening/preventive services that you may require over the next 5-10 years are detailed below  Some tests may not apply to you based off risk factors and/or age  Screening tests ordered at today's visit but not completed yet may show as past due  Also, please note that scanned in results may not display below  Preventive Screenings:  Service Recommendations Previous Testing/Comments   Colorectal Cancer Screening  * Colonoscopy    * Fecal Occult Blood Test (FOBT)/Fecal Immunochemical Test (FIT)  * Fecal DNA/Cologuard Test  * Flexible Sigmoidoscopy Age: 54-65 years old   Colonoscopy: every 10 years (may be performed more frequently if at higher risk)  OR  FOBT/FIT: every 1 year  OR  Cologuard: every 3 years  OR  Sigmoidoscopy: every 5 years  Screening may be recommended earlier than age 48 if at higher risk for colorectal cancer  Also, an individualized decision between you and your healthcare provider will decide whether screening between the ages of 74-80 would be appropriate  Colonoscopy: Not on file  FOBT/FIT: Not on file  Cologuard: Not on file  Sigmoidoscopy: Not on file    Screening Not Indicated     Breast Cancer Screening Age: 36 years old  Frequency: every 1-2 years  Not required if history of left and right mastectomy Mammogram: Not on file       Cervical Cancer Screening Between the ages of 21-29, pap smear recommended once every 3 years  Between the ages of 33-67, can perform pap smear with HPV co-testing every 5 years     Recommendations may differ for women with a history of total hysterectomy, cervical cancer, or abnormal pap smears in past  Pap Smear: Not on file    Screening Not Indicated   Hepatitis C Screening Once for adults born between 1945 and 1965  More frequently in patients at high risk for Hepatitis C Hep C Antibody: Not on file       Diabetes Screening 1-2 times per year if you're at risk for diabetes or have pre-diabetes Fasting glucose: 94 mg/dL   A1C: No results in last 5 years    Screening Current   Cholesterol Screening Once every 5 years if you don't have a lipid disorder  May order more often based on risk factors  Lipid panel: 07/24/2020    Screening Not Indicated  History Lipid Disorder     Other Preventive Screenings Covered by Medicare:  1  Abdominal Aortic Aneurysm (AAA) Screening: covered once if your at risk  You're considered to be at risk if you have a family history of AAA  2  Lung Cancer Screening: covers low dose CT scan once per year if you meet all of the following conditions: (1) Age 50-69; (2) No signs or symptoms of lung cancer; (3) Current smoker or have quit smoking within the last 15 years; (4) You have a tobacco smoking history of at least 30 pack years (packs per day multiplied by number of years you smoked); (5) You get a written order from a healthcare provider  3  Glaucoma Screening: covered annually if you're considered high risk: (1) You have diabetes OR (2) Family history of glaucoma OR (3)  aged 48 and older OR (3)  American aged 72 and older  3  Osteoporosis Screening: covered every 2 years if you meet one of the following conditions: (1) You're estrogen deficient and at risk for osteoporosis based off medical history and other findings; (2) Have a vertebral abnormality; (3) On glucocorticoid therapy for more than 3 months; (4) Have primary hyperparathyroidism; (5) On osteoporosis medications and need to assess response to drug therapy  · Last bone density test (DXA Scan): 09/21/2017  5  HIV Screening: covered annually if you're between the age of 12-76   Also covered annually if you are younger than 13 and older than 65 with risk factors for HIV infection  For pregnant patients, it is covered up to 3 times per pregnancy  Immunizations:  Immunization Recommendations   Influenza Vaccine Annual influenza vaccination during flu season is recommended for all persons aged >= 6 months who do not have contraindications   Pneumococcal Vaccine (Prevnar and Pneumovax)  * Prevnar = PCV13  * Pneumovax = PPSV23   Adults 25-60 years old: 1-3 doses may be recommended based on certain risk factors  Adults 72 years old: Prevnar (PCV13) vaccine recommended followed by Pneumovax (PPSV23) vaccine  If already received PPSV23 since turning 65, then PCV13 recommended at least one year after PPSV23 dose  Hepatitis B Vaccine 3 dose series if at intermediate or high risk (ex: diabetes, end stage renal disease, liver disease)   Tetanus (Td) Vaccine - COST NOT COVERED BY MEDICARE PART B Following completion of primary series, a booster dose should be given every 10 years to maintain immunity against tetanus  Td may also be given as tetanus wound prophylaxis  Tdap Vaccine - COST NOT COVERED BY MEDICARE PART B Recommended at least once for all adults  For pregnant patients, recommended with each pregnancy  Shingles Vaccine (Shingrix) - COST NOT COVERED BY MEDICARE PART B  2 shot series recommended in those aged 48 and above     Health Maintenance Due:  There are no preventive care reminders to display for this patient  Immunizations Due:      Topic Date Due    DTaP,Tdap,and Td Vaccines (1 - Tdap) 02/05/1955    Influenza Vaccine  07/01/2020     Advance Directives   What are advance directives? Advance directives are legal documents that state your wishes and plans for medical care  These plans are made ahead of time in case you lose your ability to make decisions for yourself  Advance directives can apply to any medical decision, such as the treatments you want, and if you want to donate organs  What are the types of advance directives? There are many types of advance directives, and each state has rules about how to use them  You may choose a combination of any of the following:  · Living will: This is a written record of the treatment you want  You can also choose which treatments you do not want, which to limit, and which to stop at a certain time  This includes surgery, medicine, IV fluid, and tube feedings  · Durable power of  for healthcare [facility]): This is a written record that states who you want to make healthcare choices for you when you are unable to make them for yourself  This person, called a proxy, is usually a family member or a friend  You may choose more than 1 proxy  · Do not resuscitate (DNR) order:  A DNR order is used in case your heart stops beating or you stop breathing  It is a request not to have certain forms of treatment, such as CPR  A DNR order may be included in other types of advance directives  · Medical directive: This covers the care that you want if you are in a coma, near death, or unable to make decisions for yourself  You can list the treatments you want for each condition  Treatment may include pain medicine, surgery, blood transfusions, dialysis, IV or tube feedings, and a ventilator (breathing machine)  · Values history: This document has questions about your views, beliefs, and how you feel and think about life  This information can help others choose the care that you would choose  Why are advance directives important? An advance directive helps you control your care  Although spoken wishes may be used, it is better to have your wishes written down  Spoken wishes can be misunderstood, or not followed  Treatments may be given even if you do not want them  An advance directive may make it easier for your family to make difficult choices about your care        © Copyright [employer] 2018 Information is for End User's use only and may not be sold, redistributed or otherwise used for commercial purposes   All illustrations and images included in CareNotes® are the copyrighted property of A D A M , Inc  or [facility] Yael Moreno

## 2020-08-13 NOTE — PROGRESS NOTES
Assessment/Plan:   Follow-up in 1 month to reassess blood pressure  Patient will continue her current medication which is losartan 25 mg 2 in the morning and 1 in the evening  Quality Measures:       Return in about 4 weeks (around 9/10/2020) for Next scheduled follow up  Diagnoses and all orders for this visit:    Encounter for Medicare annual wellness exam    Mixed hyperlipidemia    Essential hypertension          Subjective:      Patient ID: Odette Osman is a 80 y o  female  Follow-up    Since last visit an at the request of her  she had been sent to neurology due to memory issues  Neurology attempted to put her on Exelon patch which she used for a brief period of time and now refuses to use  She refuses any neurology follow-up  She believes her memory has failed to some degree but I am 80years old  She states her 's memory is also not good  But as a couple they have 1 good memory  Hypertension:  Currently on losartan 25 mg 2 in the morning 1 in the evening  Blood pressure today seems much better controlled  She does have a complaint of fatigue from the medication  I have asked her to stay with the medication a little longer as a side effect should wear off  She had previously had difficulty with same complaint using amlodipine  She did bring her blood pressure cuff today which we calibrated to our cuff and it is fairly close  No complaint of chest pain, palpitations, shortness of breath, edema, lightheadedness  No headache or visual disturbance  Hyperlipidemia:  Elevated total cholesterol and LDL  Elevated HDL  Despite discussing with her the importance of cholesterol control for her current disease, she refuses to take any cholesterol-lowering agent        ALLERGIES:  Allergies   Allergen Reactions    Latex     Molds & Smuts     Penicillins        CURRENT MEDICATIONS:    Current Outpatient Medications:     losartan (COZAAR) 25 mg tablet, Take 1 tablet (25 mg total) by mouth 2 (two) times a day (Patient taking differently: Take by mouth 2 (two) times a day 50 mg in the morning and 25 mg at night), Disp: 180 tablet, Rfl: 1    ACTIVE PROBLEM LIST:  Patient Active Problem List   Diagnosis    Allergic rhinitis    Chronic insomnia    Hypothyroidism, adult    TIA (transient ischemic attack)    Left carotid bruit    Hyperlipidemia    Dry skin dermatitis    Osteoporosis    Essential hypertension    Memory loss       PAST MEDICAL HISTORY:  History reviewed  No pertinent past medical history      PAST SURGICAL HISTORY:  Past Surgical History:   Procedure Laterality Date    FOOT SURGERY  2007       FAMILY HISTORY:  Family History   Problem Relation Age of Onset   [facility] Parkinsonism Mother     Diabetes Father     Hyperlipidemia Father     Hypertension Father     Other Father         cardiac Disorder       SOCIAL HISTORY:  Social History     Socioeconomic History    Marital status: /Civil Union     Spouse name: Not on file    Number of children: 2    Years of education: Not on file    Highest education level: Not on file   Occupational History    Occupation: Retired   Social Needs    Financial resource strain: Not on file    Food insecurity     Worry: Not on file     Inability: Not on file   [place] needs     Medical: Not on file     Non-medical: Not on file   Tobacco Use    Smoking status: Former Smoker    Smokeless tobacco: Never Used    Tobacco comment: Quit smoking at 21years of age   Substance and Sexual Activity    Alcohol use: Yes     Comment: occasional    Drug use: No    Sexual activity: Never     Partners: Male   Lifestyle    Physical activity     Days per week: Not on file     Minutes per session: Not on file    Stress: Not on file   Relationships    Social connections     Talks on phone: Not on file     Gets together: Not on file     Attends [employer] service: Not on file     Active member of club or organization: Not on file     Attends meetings of clubs or organizations: Not on file     Relationship status: Not on file    Intimate partner violence     Fear of current or ex partner: Not on file     Emotionally abused: Not on file     Physically abused: Not on file     Forced sexual activity: Not on file   Other Topics Concern    Not on file   Social History Narrative    Caffeine use    Graduated from high school    Lives with adult children    Lives with     Denied: History of High risk sexual behavior       Review of Systems   Constitutional: Positive for fatigue  Negative for activity change, chills and fever  HENT: Negative for congestion  Eyes: Negative for discharge and visual disturbance  Respiratory: Negative for cough, chest tightness and shortness of breath  Cardiovascular: Negative for chest pain, palpitations and leg swelling  Gastrointestinal: Negative for abdominal pain  Genitourinary: Negative for difficulty urinating  Musculoskeletal: Negative for arthralgias and myalgias  Skin: Negative for rash  Allergic/Immunologic: Negative for immunocompromised state  Neurological: Negative for dizziness, syncope, weakness, light-headedness and headaches  Hematological: Negative for adenopathy  Does not bruise/bleed easily  Psychiatric/Behavioral: Negative for dysphoric mood  The patient is not nervous/anxious  Objective:  Vitals:    08/13/20 1318   BP: 126/70   BP Location: Left arm   Patient Position: Sitting   Cuff Size: Standard   Pulse: 96   Resp: 16   Temp: 98 7 °F (37 1 °C)   TempSrc: Tympanic   SpO2: 98%   Weight: 48 5 kg (107 lb)   Height: 5' 2" (1 575 m)     Body mass index is 19 57 kg/m²  Physical Exam  Vitals signs and nursing note reviewed  Constitutional:       General: She is not in acute distress  Appearance: She is well-developed  HENT:      Head: Normocephalic and atraumatic  Eyes:      Pupils: Pupils are equal, round, and reactive to light     Neck: Vascular: No carotid bruit or JVD  Cardiovascular:      Rate and Rhythm: Normal rate and regular rhythm  Heart sounds: Normal heart sounds  Pulmonary:      Effort: Pulmonary effort is normal  No respiratory distress  Breath sounds: Normal breath sounds  Musculoskeletal:         General: No swelling  Right lower leg: No edema  Left lower leg: No edema  Lymphadenopathy:      Cervical: No cervical adenopathy  Skin:     General: Skin is warm and dry  Findings: No rash  Neurological:      General: No focal deficit present  Mental Status: She is alert  Mental status is at baseline  Motor: No weakness     Psychiatric:         Mood and Affect: Mood normal          Behavior: Behavior normal            RESULTS:    Recent Results (from the past 1008 hour(s))   T4, free    Collection Time: 07/24/20  7:50 AM   Result Value Ref Range    Free T4 0 98 0 76 - 1 46 ng/dL   TSH, 3rd generation    Collection Time: 07/24/20  7:50 AM   Result Value Ref Range    TSH 3RD GENERATON 3 521 0 358 - 3 740 uIU/mL   CBC and differential    Collection Time: 07/24/20  7:50 AM   Result Value Ref Range    WBC 7 17 4 31 - 10 16 Thousand/uL    RBC 4 10 3 81 - 5 12 Million/uL    Hemoglobin 12 1 11 5 - 15 4 g/dL    Hematocrit 38 6 34 8 - 46 1 %    MCV 94 82 - 98 fL    MCH 29 5 26 8 - 34 3 pg    MCHC 31 3 (L) 31 4 - 37 4 g/dL    RDW 13 2 11 6 - 15 1 %    MPV 11 7 8 9 - 12 7 fL    Platelets 826 917 - 207 Thousands/uL    nRBC 0 /100 WBCs    Neutrophils Relative 59 43 - 75 %    Immat GRANS % 0 0 - 2 %    Lymphocytes Relative 31 14 - 44 %    Monocytes Relative 7 4 - 12 %    Eosinophils Relative 2 0 - 6 %    Basophils Relative 1 0 - 1 %    Neutrophils Absolute 4 31 1 85 - 7 62 Thousands/µL    Immature Grans Absolute 0 01 0 00 - 0 20 Thousand/uL    Lymphocytes Absolute 2 19 0 60 - 4 47 Thousands/µL    Monocytes Absolute 0 47 0 17 - 1 22 Thousand/µL    Eosinophils Absolute 0 13 0 00 - 0 61 Thousand/µL    Basophils Absolute 0 06 0 00 - 0 10 Thousands/µL   Comprehensive metabolic panel    Collection Time: 07/24/20  7:50 AM   Result Value Ref Range    Sodium 139 136 - 145 mmol/L    Potassium 4 4 3 5 - 5 3 mmol/L    Chloride 103 100 - 108 mmol/L    CO2 30 21 - 32 mmol/L    ANION GAP 6 4 - 13 mmol/L    BUN 16 5 - 25 mg/dL    Creatinine 0 71 0 60 - 1 30 mg/dL    Glucose, Fasting 94 65 - 99 mg/dL    Calcium 9 0 8 3 - 10 1 mg/dL    AST 17 5 - 45 U/L    ALT 12 12 - 78 U/L    Alkaline Phosphatase 78 46 - 116 U/L    Total Protein 7 4 6 4 - 8 2 g/dL    Albumin 3 8 3 5 - 5 0 g/dL    Total Bilirubin 0 40 0 20 - 1 00 mg/dL    eGFR 77 ml/min/1 73sq m   Lipid panel    Collection Time: 07/24/20  7:50 AM   Result Value Ref Range    Cholesterol 338 (H) 50 - 200 mg/dL    Triglycerides 79 <=150 mg/dL    HDL, Direct 85 >=40 mg/dL    LDL Calculated 237 (H) 0 - 100 mg/dL    Non-HDL-Chol (CHOL-HDL) 253 mg/dl   Lyme Antibody Profile with reflex to WB    Collection Time: 07/24/20  7:50 AM   Result Value Ref Range    Lyme IgG/IgM Ab <0 91 0 00 - 0 90 ISR   RPR    Collection Time: 07/24/20  7:50 AM   Result Value Ref Range    RPR Non-Reactive Non-Reactive   Vitamin B12    Collection Time: 07/24/20  7:50 AM   Result Value Ref Range    Vitamin B-12 768 100 - 900 pg/mL   Folate    Collection Time: 07/24/20  7:50 AM   Result Value Ref Range    Folate 10 0 3 1 - 17 5 ng/mL       This note was created with voice recognition software  Phonic, grammatical and spelling errors may be present within the note as a result

## 2020-08-13 NOTE — PROGRESS NOTES
Assessment and Plan:     Problem List Items Addressed This Visit        Cardiovascular and Mediastinum    Essential hypertension       Other    Hyperlipidemia      Other Visit Diagnoses     Encounter for Medicare annual wellness exam    -  Primary           Preventive health issues were discussed with patient, and age appropriate screening tests were ordered as noted in patient's After Visit Summary  Personalized health advice and appropriate referrals for health education or preventive services given if needed, as noted in patient's After Visit Summary  History of Present Illness:     Patient presents for Medicare annual wellness visit  Questionnaire was reviewed, clarified, and updated with the patient today  Patient's  also present  Patient Care Team:  Abhay Carr MD as PCP - General (Internal Medicine)  Chucky Parham MD     Review of Systems:     Review of Systems   Constitutional: Negative for activity change, chills, fatigue and fever  HENT: Negative for congestion  Eyes: Negative for discharge  Respiratory: Negative for cough, chest tightness and shortness of breath  Cardiovascular: Negative for chest pain, palpitations and leg swelling  Gastrointestinal: Negative for abdominal pain, blood in stool, constipation, diarrhea, nausea and vomiting  Endocrine: Negative for polydipsia, polyphagia and polyuria  Genitourinary: Negative for difficulty urinating  Musculoskeletal: Negative for arthralgias and myalgias  Skin: Negative for rash  Allergic/Immunologic: Negative for immunocompromised state  Neurological: Negative for dizziness, syncope, weakness, light-headedness and headaches  Hematological: Negative for adenopathy  Does not bruise/bleed easily  Psychiatric/Behavioral: Negative for dysphoric mood and sleep disturbance  The patient is not nervous/anxious         Problem List:     Patient Active Problem List   Diagnosis    Allergic rhinitis    Chronic insomnia  Hypothyroidism, adult    TIA (transient ischemic attack)    Left carotid bruit    Hyperlipidemia    Dry skin dermatitis    Osteoporosis    Essential hypertension    Memory loss      Past Medical and Surgical History:     History reviewed  No pertinent past medical history    Past Surgical History:   Procedure Laterality Date    FOOT SURGERY  2007      Family History:     Family History   Problem Relation Age of Onset   [de-identified] Parkinsonism Mother     Diabetes Father     Hyperlipidemia Father     Hypertension Father     Other Father         cardiac Disorder      Social History:     E-Cigarette/Vaping    E-Cigarette Use Never User      E-Cigarette/Vaping Substances    Nicotine No     THC No     CBD No     Flavoring No     Other No     Unknown No      Social History     Socioeconomic History    Marital status: /Civil Union     Spouse name: None    Number of children: 2    Years of education: None    Highest education level: None   Occupational History    Occupation: Retired   Social Needs    Financial resource strain: None    Food insecurity     Worry: None     Inability: None    Transportation needs     Medical: None     Non-medical: None   Tobacco Use    Smoking status: Former Smoker    Smokeless tobacco: Never Used    Tobacco comment: Quit smoking at 21years of age   Substance and Sexual Activity    Alcohol use: Yes     Comment: occasional    Drug use: No    Sexual activity: Never     Partners: Male   Lifestyle    Physical activity     Days per week: None     Minutes per session: None    Stress: None   Relationships    Social connections     Talks on phone: None     Gets together: None     Attends Buddhism service: None     Active member of club or organization: None     Attends meetings of clubs or organizations: None     Relationship status: None    Intimate partner violence     Fear of current or ex partner: None     Emotionally abused: None     Physically abused: None Forced sexual activity: None   Other Topics Concern    None   Social History Narrative    Caffeine use    Graduated from high school    Lives with adult children    Lives with     Denied: History of High risk sexual behavior      Medications and Allergies:     Current Outpatient Medications   Medication Sig Dispense Refill    losartan (COZAAR) 25 mg tablet Take 1 tablet (25 mg total) by mouth 2 (two) times a day (Patient taking differently: Take by mouth 2 (two) times a day 50 mg in the morning and 25 mg at night) 180 tablet 1     No current facility-administered medications for this visit  Allergies   Allergen Reactions    Latex     Molds & Smuts     Penicillins       Immunizations:     Immunization History   Administered Date(s) Administered    INFLUENZA 10/02/2014, 10/02/2014, 10/27/2016, 10/27/2016, 11/19/2017    Influenza Split High Dose Preservative Free IM 11/19/2017    Influenza, high dose seasonal 0 5 mL 10/11/2018, 10/22/2019    Pneumococcal Conjugate 13-Valent 12/01/2016, 12/01/2016, 12/19/2017    Pneumococcal Polysaccharide PPV23 08/05/2019      Health Maintenance: There are no preventive care reminders to display for this patient  Topic Date Due    DTaP,Tdap,and Td Vaccines (1 - Tdap) 02/05/1955    Influenza Vaccine  07/01/2020      Medicare Screening Tests and Risk Assessments:     Pat Smith is here for her Subsequent Wellness visit  Health Risk Assessment:   Patient rates overall health as fair  Patient feels that their physical health rating is slightly worse  Eyesight was rated as same  Hearing was rated as same  Patient feels that their emotional and mental health rating is slightly worse  Pain experienced in the last 7 days has been none  Patient states that she has experienced no weight loss or gain in last 6 months  Stress over husbands health    Depression Screening:   PHQ-2 Score: 2      Fall Risk Screening:    In the past year, patient has experienced: no history of falling in past year      Urinary Incontinence Screening:   Patient has not leaked urine accidently in the last six months  Home Safety:  Patient does not have trouble with stairs inside or outside of their home  Patient has working smoke alarms and has no working carbon monoxide detector  Home safety hazards include: none  Nutrition:   Current diet is Regular and No Added Salt  Medications:   Patient is not currently taking any over-the-counter supplements  Patient is able to manage medications  Activities of Daily Living (ADLs)/Instrumental Activities of Daily Living (IADLs):   Walk and transfer into and out of bed and chair?: Yes  Dress and groom yourself?: Yes    Bathe or shower yourself?: Yes    Feed yourself? Yes  Do your laundry/housekeeping?: Yes  Manage your money, pay your bills and track your expenses?: Yes  Make your own meals?: Yes    Do your own shopping?: Yes    Previous Hospitalizations:   Any hospitalizations or ED visits within the last 12 months?: No      Advance Care Planning:   Living will: Yes    Durable POA for healthcare:  Yes    Advanced directive: Yes      Comments: Pt will bring copy into office    Cognitive Screening:   Provider or family/friend/caregiver concerned regarding cognition?: No    PREVENTIVE SCREENINGS      Cardiovascular Screening:    General: Screening Not Indicated and History Lipid Disorder      Diabetes Screening:     General: Screening Current      Colorectal Cancer Screening:     General: Screening Not Indicated      Breast Cancer Screening:     General: Screening Not Indicated      Cervical Cancer Screening:    General: Screening Not Indicated      Osteoporosis Screening:    General: Screening Not Indicated and History Osteoporosis      Abdominal Aortic Aneurysm (AAA) Screening:        General: Screening Not Indicated      Lung Cancer Screening:     General: Screening Not Indicated      Hepatitis C Screening:    General: Screening Not Indicated    No exam data present     Physical Exam:     /70 (BP Location: Left arm, Patient Position: Sitting, Cuff Size: Standard)   Pulse 96   Temp 98 7 °F (37 1 °C) (Tympanic)   Resp 16   Ht 5' 2" (1 575 m)   Wt 48 5 kg (107 lb)   SpO2 98%   BMI 19 57 kg/m²     Physical Exam  Vitals signs and nursing note reviewed  Constitutional:       General: She is not in acute distress  HENT:      Head: Normocephalic  Eyes:      Pupils: Pupils are equal, round, and reactive to light  Neck:      Musculoskeletal: Neck supple  Vascular: No carotid bruit  Cardiovascular:      Rate and Rhythm: Normal rate and regular rhythm  Heart sounds: Normal heart sounds  No murmur  Pulmonary:      Effort: Pulmonary effort is normal  No respiratory distress  Breath sounds: Normal breath sounds  Musculoskeletal:         General: No swelling  Right lower leg: No edema  Left lower leg: No edema  Lymphadenopathy:      Cervical: No cervical adenopathy  Skin:     General: Skin is warm and dry  Findings: No rash  Neurological:      General: No focal deficit present  Mental Status: She is alert  Mental status is at baseline  Motor: No weakness        Coordination: Coordination normal       Gait: Gait normal    Psychiatric:         Mood and Affect: Mood normal          Behavior: Behavior normal           George Michel PA-C

## 2020-08-17 ENCOUNTER — TELEPHONE (OUTPATIENT)
Dept: INTERNAL MEDICINE CLINIC | Facility: CLINIC | Age: 85
End: 2020-08-17

## 2020-08-17 NOTE — TELEPHONE ENCOUNTER
Patient's son lives in [place] and is planning on coming in for a visit the end of the week  She would like to know due to her husbands condition and her age if they should be concerned with a visit? Please advise        Call back #[phone]

## 2020-09-11 ENCOUNTER — OFFICE VISIT (OUTPATIENT)
Dept: INTERNAL MEDICINE CLINIC | Facility: CLINIC | Age: 85
End: 2020-09-11
Payer: COMMERCIAL

## 2020-09-11 VITALS
WEIGHT: 107 LBS | OXYGEN SATURATION: 99 % | SYSTOLIC BLOOD PRESSURE: 122 MMHG | HEIGHT: 62 IN | BODY MASS INDEX: 19.69 KG/M2 | HEART RATE: 79 BPM | TEMPERATURE: 98 F | DIASTOLIC BLOOD PRESSURE: 72 MMHG

## 2020-09-11 DIAGNOSIS — I10 ESSENTIAL HYPERTENSION: Primary | ICD-10-CM

## 2020-09-11 DIAGNOSIS — G45.9 TIA (TRANSIENT ISCHEMIC ATTACK): ICD-10-CM

## 2020-09-11 DIAGNOSIS — Z23 NEED FOR INFLUENZA VACCINATION: ICD-10-CM

## 2020-09-11 PROCEDURE — G0008 ADMIN INFLUENZA VIRUS VAC: HCPCS | Performed by: PHYSICIAN ASSISTANT

## 2020-09-11 PROCEDURE — 90662 IIV NO PRSV INCREASED AG IM: CPT | Performed by: PHYSICIAN ASSISTANT

## 2020-09-11 PROCEDURE — 99213 OFFICE O/P EST LOW 20 MIN: CPT | Performed by: PHYSICIAN ASSISTANT

## 2020-09-11 RX ORDER — ASPIRIN 81 MG/1
81 TABLET ORAL DAILY
Qty: 30 TABLET | Refills: 1
Start: 2020-09-11

## 2020-09-11 NOTE — PATIENT INSTRUCTIONS
No change in current medications  Will schedule follow-up in December with repeat blood work prior to that visit  Sooner as needed

## 2020-09-11 NOTE — PROGRESS NOTES
Assessment/Plan:   Patient Instructions   No change in current medications  Will schedule follow-up in December with repeat blood work prior to that visit  Sooner as needed  Quality Measures:       Return in about 14 weeks (around 12/18/2020) for Next scheduled follow up  Diagnoses and all orders for this visit:    Essential hypertension  -     CBC and differential; Future  -     Comprehensive metabolic panel; Future    TIA (transient ischemic attack)  -     aspirin (ECOTRIN LOW STRENGTH) 81 mg EC tablet; Take 1 tablet (81 mg total) by mouth daily    Need for influenza vaccination  -     influenza vaccine, high-dose, PF 0 7 mL (FLUZONE HIGH-DOSE)          Subjective:      Patient ID: Hernesto Boothe is a 80 y o  female  Follow-up    Patient brought back today to reassess her blood pressure  She is currently on losartan 25 mg, 2 tablets in the morning and 1 tablet in the evening  Blood pressure today shows good control  No complaint use of chest pain, palpitations, shortness of breath, lightheadedness, edema  Previous TIA:  CT a of head neck does show evidence of cerebral stenotic disease  Patient is on a baby aspirin daily  Patient has refused lipid lowering agents in the past, and again today when trying to discuss she is refusing  No other focal issues        ALLERGIES:  Allergies   Allergen Reactions    Latex     Molds & Smuts     Penicillins        CURRENT MEDICATIONS:    Current Outpatient Medications:     aspirin (ECOTRIN LOW STRENGTH) 81 mg EC tablet, Take 1 tablet (81 mg total) by mouth daily, Disp: 30 tablet, Rfl: 1    losartan (COZAAR) 25 mg tablet, Take 1 tablet (25 mg total) by mouth 2 (two) times a day (Patient taking differently: Take by mouth 2 (two) times a day 50 mg in the morning and 25 mg at night), Disp: 180 tablet, Rfl: 1    ACTIVE PROBLEM LIST:  Patient Active Problem List   Diagnosis    Allergic rhinitis    Chronic insomnia    Hypothyroidism, adult    TIA (transient ischemic attack)    Left carotid bruit    Hyperlipidemia    Dry skin dermatitis    Osteoporosis    Essential hypertension    Memory loss       PAST MEDICAL HISTORY:  History reviewed  No pertinent past medical history      PAST SURGICAL HISTORY:  Past Surgical History:   Procedure Laterality Date    FOOT SURGERY  2007       FAMILY HISTORY:  Family History   Problem Relation Age of Onset   [place] Parkinsonism Mother     Diabetes Father     Hyperlipidemia Father     Hypertension Father     Other Father         cardiac Disorder       SOCIAL HISTORY:  Social History     Socioeconomic History    Marital status: /Civil Union     Spouse name: Not on file    Number of children: 2    Years of education: Not on file    Highest education level: Not on file   Occupational History    Occupation: Retired   Social Needs    Financial resource strain: Not on file    Food insecurity     Worry: Not on file     Inability: Not on file   [employer] needs     Medical: Not on file     Non-medical: Not on file   Tobacco Use    Smoking status: Former Smoker    Smokeless tobacco: Never Used    Tobacco comment: Quit smoking at 21years of age   Substance and Sexual Activity    Alcohol use: Yes     Comment: occasional    Drug use: No    Sexual activity: Never     Partners: Male   Lifestyle    Physical activity     Days per week: Not on file     Minutes per session: Not on file    Stress: Not on file   Relationships    Social connections     Talks on phone: Not on file     Gets together: Not on file     Attends Evangelical service: Not on file     Active member of club or organization: Not on file     Attends meetings of clubs or organizations: Not on file     Relationship status: Not on file    Intimate partner violence     Fear of current or ex partner: Not on file     Emotionally abused: Not on file     Physically abused: Not on file     Forced sexual activity: Not on file   Other Topics Concern    Not on file   Social History Narrative    Caffeine use    Graduated from high school    Lives with adult children    Lives with     Denied: History of High risk sexual behavior       Review of Systems   Constitutional: Negative for activity change, chills, fatigue and fever  HENT: Negative for congestion  Eyes: Negative for discharge  Respiratory: Negative for cough, chest tightness and shortness of breath  Cardiovascular: Negative for chest pain, palpitations and leg swelling  Gastrointestinal: Negative for abdominal pain, blood in stool, constipation, diarrhea, nausea and vomiting  Endocrine: Negative for polydipsia, polyphagia and polyuria  Genitourinary: Negative for difficulty urinating  Musculoskeletal: Negative for arthralgias and myalgias  Skin: Negative for rash  Allergic/Immunologic: Negative for immunocompromised state  Neurological: Negative for dizziness, syncope, weakness, light-headedness and headaches  Hematological: Negative for adenopathy  Does not bruise/bleed easily  Psychiatric/Behavioral: Negative for dysphoric mood, sleep disturbance and suicidal ideas  The patient is not nervous/anxious  Objective:  Vitals:    09/11/20 0908   BP: 122/72   BP Location: Left arm   Patient Position: Sitting   Cuff Size: Adult   Pulse: 79   Temp: 98 °F (36 7 °C)   TempSrc: Temporal   SpO2: 99%   Weight: 48 5 kg (107 lb)   Height: 5' 2" (1 575 m)     Body mass index is 19 57 kg/m²  Physical Exam  Vitals signs and nursing note reviewed  Constitutional:       General: She is not in acute distress  Appearance: She is well-developed  HENT:      Head: Normocephalic and atraumatic  Eyes:      Pupils: Pupils are equal, round, and reactive to light  Neck:      Vascular: No carotid bruit or JVD  Cardiovascular:      Rate and Rhythm: Normal rate and regular rhythm  Heart sounds: Normal heart sounds     Pulmonary:      Effort: Pulmonary effort is normal  No respiratory distress  Breath sounds: Normal breath sounds  Musculoskeletal:      Right lower leg: No edema  Left lower leg: No edema  Lymphadenopathy:      Cervical: No cervical adenopathy  Skin:     General: Skin is warm and dry  Findings: No rash  Neurological:      General: No focal deficit present  Mental Status: She is alert and oriented to person, place, and time  Mental status is at baseline  Psychiatric:         Mood and Affect: Mood normal          Behavior: Behavior normal            RESULTS:    No results found for this or any previous visit (from the past 1008 hour(s))  This note was created with voice recognition software  Phonic, grammatical and spelling errors may be present within the note as a result

## 2020-09-18 ENCOUNTER — APPOINTMENT (OUTPATIENT)
Dept: LAB | Facility: HOSPITAL | Age: 85
End: 2020-09-18
Payer: COMMERCIAL

## 2020-09-18 DIAGNOSIS — I10 ESSENTIAL HYPERTENSION: ICD-10-CM

## 2020-09-18 LAB
ALBUMIN SERPL BCP-MCNC: 3.6 G/DL (ref 3.5–5)
ALP SERPL-CCNC: 97 U/L (ref 46–116)
ALT SERPL W P-5'-P-CCNC: 17 U/L (ref 12–78)
ANION GAP SERPL CALCULATED.3IONS-SCNC: 5 MMOL/L (ref 4–13)
AST SERPL W P-5'-P-CCNC: 16 U/L (ref 5–45)
BASOPHILS # BLD AUTO: 0.05 THOUSANDS/ΜL (ref 0–0.1)
BASOPHILS NFR BLD AUTO: 1 % (ref 0–1)
BILIRUB SERPL-MCNC: 0.2 MG/DL (ref 0.2–1)
BUN SERPL-MCNC: 20 MG/DL (ref 5–25)
CALCIUM SERPL-MCNC: 9.1 MG/DL (ref 8.3–10.1)
CHLORIDE SERPL-SCNC: 106 MMOL/L (ref 100–108)
CO2 SERPL-SCNC: 32 MMOL/L (ref 21–32)
CREAT SERPL-MCNC: 0.76 MG/DL (ref 0.6–1.3)
EOSINOPHIL # BLD AUTO: 0.19 THOUSAND/ΜL (ref 0–0.61)
EOSINOPHIL NFR BLD AUTO: 2 % (ref 0–6)
ERYTHROCYTE [DISTWIDTH] IN BLOOD BY AUTOMATED COUNT: 13.2 % (ref 11.6–15.1)
GFR SERPL CREATININE-BSD FRML MDRD: 71 ML/MIN/1.73SQ M
GLUCOSE P FAST SERPL-MCNC: 102 MG/DL (ref 65–99)
HCT VFR BLD AUTO: 36.9 % (ref 34.8–46.1)
HGB BLD-MCNC: 11.6 G/DL (ref 11.5–15.4)
IMM GRANULOCYTES # BLD AUTO: 0.03 THOUSAND/UL (ref 0–0.2)
IMM GRANULOCYTES NFR BLD AUTO: 0 % (ref 0–2)
LYMPHOCYTES # BLD AUTO: 2.69 THOUSANDS/ΜL (ref 0.6–4.47)
LYMPHOCYTES NFR BLD AUTO: 32 % (ref 14–44)
MCH RBC QN AUTO: 29.8 PG (ref 26.8–34.3)
MCHC RBC AUTO-ENTMCNC: 31.4 G/DL (ref 31.4–37.4)
MCV RBC AUTO: 95 FL (ref 82–98)
MONOCYTES # BLD AUTO: 0.69 THOUSAND/ΜL (ref 0.17–1.22)
MONOCYTES NFR BLD AUTO: 8 % (ref 4–12)
NEUTROPHILS # BLD AUTO: 4.86 THOUSANDS/ΜL (ref 1.85–7.62)
NEUTS SEG NFR BLD AUTO: 57 % (ref 43–75)
NRBC BLD AUTO-RTO: 0 /100 WBCS
PLATELET # BLD AUTO: 220 THOUSANDS/UL (ref 149–390)
PMV BLD AUTO: 11.4 FL (ref 8.9–12.7)
POTASSIUM SERPL-SCNC: 4.7 MMOL/L (ref 3.5–5.3)
PROT SERPL-MCNC: 7.3 G/DL (ref 6.4–8.2)
RBC # BLD AUTO: 3.89 MILLION/UL (ref 3.81–5.12)
SODIUM SERPL-SCNC: 143 MMOL/L (ref 136–145)
WBC # BLD AUTO: 8.51 THOUSAND/UL (ref 4.31–10.16)

## 2020-09-18 PROCEDURE — 85025 COMPLETE CBC W/AUTO DIFF WBC: CPT

## 2020-09-18 PROCEDURE — 36415 COLL VENOUS BLD VENIPUNCTURE: CPT

## 2020-09-18 PROCEDURE — 80053 COMPREHEN METABOLIC PANEL: CPT

## 2020-10-02 DIAGNOSIS — I10 ESSENTIAL HYPERTENSION: ICD-10-CM

## 2020-10-02 RX ORDER — LOSARTAN POTASSIUM 25 MG/1
25 TABLET ORAL 2 TIMES DAILY
Qty: 180 TABLET | Refills: 1 | Status: SHIPPED | OUTPATIENT
Start: 2020-10-02 | End: 2021-04-07

## 2020-11-30 ENCOUNTER — TELEPHONE (OUTPATIENT)
Dept: INTERNAL MEDICINE CLINIC | Facility: CLINIC | Age: 85
End: 2020-11-30

## 2020-12-07 ENCOUNTER — TELEPHONE (OUTPATIENT)
Dept: INTERNAL MEDICINE CLINIC | Facility: CLINIC | Age: 85
End: 2020-12-07

## 2021-01-08 ENCOUNTER — OFFICE VISIT (OUTPATIENT)
Dept: INTERNAL MEDICINE CLINIC | Facility: CLINIC | Age: 86
End: 2021-01-08
Payer: COMMERCIAL

## 2021-01-08 VITALS
TEMPERATURE: 97.6 F | WEIGHT: 109 LBS | HEART RATE: 92 BPM | OXYGEN SATURATION: 99 % | SYSTOLIC BLOOD PRESSURE: 120 MMHG | BODY MASS INDEX: 19.94 KG/M2 | RESPIRATION RATE: 16 BRPM | DIASTOLIC BLOOD PRESSURE: 70 MMHG

## 2021-01-08 DIAGNOSIS — E78.2 MIXED HYPERLIPIDEMIA: ICD-10-CM

## 2021-01-08 DIAGNOSIS — I10 ESSENTIAL HYPERTENSION: Primary | ICD-10-CM

## 2021-01-08 DIAGNOSIS — E03.9 HYPOTHYROIDISM, ADULT: ICD-10-CM

## 2021-01-08 PROCEDURE — 99214 OFFICE O/P EST MOD 30 MIN: CPT | Performed by: INTERNAL MEDICINE

## 2021-01-08 NOTE — PROGRESS NOTES
Assessment/Plan:       Chronic problems appear stable at this point  Will continue current medications  Ordered labs  Will have them follow-up in 6 months since they will not be traveling to [place] this year  Sooner if any lab abnormalities  Quality Measures:       No follow-ups on file  No problem-specific Assessment & Plan notes found for this encounter  Diagnoses and all orders for this visit:    Essential hypertension  -     CBC and differential; Future  -     Comprehensive metabolic panel; Future    Mixed hyperlipidemia  -     Lipid panel; Future    Hypothyroidism, adult  -     T4, free; Future  -     TSH, 3rd generation; Future          Subjective:      Patient ID: Marialuisa Sutherland is a 80 y o  female  Patient comes in today for routine follow-up  She states she is doing okay  She was more concerned about her  and his medical problems  He just had another procedure done last month  Her blood pressure is controlled so she appears to be taking her medicine  She is due for some blood work for her cholesterol and make sure thyroid levels are still controlled without medicine  She reports no new complaints today  No further additions to her history        ALLERGIES:  Allergies   Allergen Reactions    Latex     Molds & Smuts     Penicillins        CURRENT MEDICATIONS:    Current Outpatient Medications:     aspirin (ECOTRIN LOW STRENGTH) 81 mg EC tablet, Take 1 tablet (81 mg total) by mouth daily, Disp: 30 tablet, Rfl: 1    losartan (COZAAR) 25 mg tablet, Take 1 tablet (25 mg total) by mouth 2 (two) times a day, Disp: 180 tablet, Rfl: 1    ACTIVE PROBLEM LIST:  Patient Active Problem List   Diagnosis    Allergic rhinitis    Chronic insomnia    Hypothyroidism, adult    TIA (transient ischemic attack)    Left carotid bruit    Hyperlipidemia    Dry skin dermatitis    Osteoporosis    Essential hypertension    Memory loss       PAST MEDICAL HISTORY:  No past medical history on file      PAST SURGICAL HISTORY:  Past Surgical History:   Procedure Laterality Date    FOOT SURGERY  2007       FAMILY HISTORY:  Family History   Problem Relation Age of Onset   [facility] Parkinsonism Mother     Diabetes Father     Hyperlipidemia Father     Hypertension Father     Other Father         cardiac Disorder       SOCIAL HISTORY:  Social History     Socioeconomic History    Marital status: /Civil Union     Spouse name: Not on file    Number of children: 2    Years of education: Not on file    Highest education level: Not on file   Occupational History    Occupation: Retired   Social Needs    Financial resource strain: Not on file    Food insecurity     Worry: Not on file     Inability: Not on file   [employer] needs     Medical: Not on file     Non-medical: Not on file   Tobacco Use    Smoking status: Former Smoker    Smokeless tobacco: Never Used    Tobacco comment: Quit smoking at 21years of age   Substance and Sexual Activity    Alcohol use: Yes     Comment: occasional    Drug use: No    Sexual activity: Never     Partners: Male   Lifestyle    Physical activity     Days per week: Not on file     Minutes per session: Not on file    Stress: Not on file   Relationships    Social connections     Talks on phone: Not on file     Gets together: Not on file     Attends Nondenominational service: Not on file     Active member of club or organization: Not on file     Attends meetings of clubs or organizations: Not on file     Relationship status: Not on file    Intimate partner violence     Fear of current or ex partner: Not on file     Emotionally abused: Not on file     Physically abused: Not on file     Forced sexual activity: Not on file   Other Topics Concern    Not on file   Social History Narrative    Caffeine use    Graduated from high school    Lives with adult children    Lives with     Denied: History of High risk sexual behavior       Review of Systems Respiratory: Negative for shortness of breath  Cardiovascular: Negative for chest pain  Gastrointestinal: Negative for abdominal pain  Objective:  Vitals:    01/08/21 1133   BP: 120/70   BP Location: Left arm   Pulse: 92   Resp: 16   Temp: 97 6 °F (36 4 °C)   SpO2: 99%   Weight: 49 4 kg (109 lb)     Body mass index is 19 94 kg/m²  Physical Exam  Vitals signs and nursing note reviewed  Constitutional:       Appearance: She is well-developed  Cardiovascular:      Rate and Rhythm: Normal rate and regular rhythm  Heart sounds: Normal heart sounds  Pulmonary:      Effort: Pulmonary effort is normal       Breath sounds: Normal breath sounds  Abdominal:      Palpations: Abdomen is soft  Tenderness: There is no abdominal tenderness  Neurological:      Mental Status: She is alert and oriented to person, place, and time  RESULTS:    No results found for this or any previous visit (from the past 1008 hour(s))  This note was created with voice recognition software  Phonic, grammatical and spelling errors may be present within the note as a result

## 2021-01-12 ENCOUNTER — LAB (OUTPATIENT)
Dept: LAB | Facility: HOSPITAL | Age: 86
End: 2021-01-12
Attending: INTERNAL MEDICINE
Payer: COMMERCIAL

## 2021-01-12 DIAGNOSIS — E03.9 HYPOTHYROIDISM, ADULT: ICD-10-CM

## 2021-01-12 DIAGNOSIS — E78.2 MIXED HYPERLIPIDEMIA: ICD-10-CM

## 2021-01-12 DIAGNOSIS — I10 ESSENTIAL HYPERTENSION: ICD-10-CM

## 2021-01-12 LAB
ALBUMIN SERPL BCP-MCNC: 3.7 G/DL (ref 3.5–5)
ALP SERPL-CCNC: 74 U/L (ref 46–116)
ALT SERPL W P-5'-P-CCNC: 17 U/L (ref 12–78)
ANION GAP SERPL CALCULATED.3IONS-SCNC: 6 MMOL/L (ref 4–13)
AST SERPL W P-5'-P-CCNC: 16 U/L (ref 5–45)
BASOPHILS # BLD AUTO: 0.06 THOUSANDS/ΜL (ref 0–0.1)
BASOPHILS NFR BLD AUTO: 1 % (ref 0–1)
BILIRUB SERPL-MCNC: 0.5 MG/DL (ref 0.2–1)
BUN SERPL-MCNC: 21 MG/DL (ref 5–25)
CALCIUM SERPL-MCNC: 9.1 MG/DL (ref 8.3–10.1)
CHLORIDE SERPL-SCNC: 102 MMOL/L (ref 100–108)
CHOLEST SERPL-MCNC: 359 MG/DL (ref 50–200)
CO2 SERPL-SCNC: 30 MMOL/L (ref 21–32)
CREAT SERPL-MCNC: 0.76 MG/DL (ref 0.6–1.3)
EOSINOPHIL # BLD AUTO: 0.12 THOUSAND/ΜL (ref 0–0.61)
EOSINOPHIL NFR BLD AUTO: 2 % (ref 0–6)
ERYTHROCYTE [DISTWIDTH] IN BLOOD BY AUTOMATED COUNT: 13.1 % (ref 11.6–15.1)
GFR SERPL CREATININE-BSD FRML MDRD: 71 ML/MIN/1.73SQ M
GLUCOSE P FAST SERPL-MCNC: 94 MG/DL (ref 65–99)
HCT VFR BLD AUTO: 37.7 % (ref 34.8–46.1)
HDLC SERPL-MCNC: 97 MG/DL
HGB BLD-MCNC: 12 G/DL (ref 11.5–15.4)
IMM GRANULOCYTES # BLD AUTO: 0.01 THOUSAND/UL (ref 0–0.2)
IMM GRANULOCYTES NFR BLD AUTO: 0 % (ref 0–2)
LDLC SERPL CALC-MCNC: 248 MG/DL (ref 0–100)
LYMPHOCYTES # BLD AUTO: 2.45 THOUSANDS/ΜL (ref 0.6–4.47)
LYMPHOCYTES NFR BLD AUTO: 36 % (ref 14–44)
MCH RBC QN AUTO: 29.9 PG (ref 26.8–34.3)
MCHC RBC AUTO-ENTMCNC: 31.8 G/DL (ref 31.4–37.4)
MCV RBC AUTO: 94 FL (ref 82–98)
MONOCYTES # BLD AUTO: 0.53 THOUSAND/ΜL (ref 0.17–1.22)
MONOCYTES NFR BLD AUTO: 8 % (ref 4–12)
NEUTROPHILS # BLD AUTO: 3.67 THOUSANDS/ΜL (ref 1.85–7.62)
NEUTS SEG NFR BLD AUTO: 53 % (ref 43–75)
NONHDLC SERPL-MCNC: 262 MG/DL
NRBC BLD AUTO-RTO: 0 /100 WBCS
PLATELET # BLD AUTO: 216 THOUSANDS/UL (ref 149–390)
PMV BLD AUTO: 11.4 FL (ref 8.9–12.7)
POTASSIUM SERPL-SCNC: 4.3 MMOL/L (ref 3.5–5.3)
PROT SERPL-MCNC: 7.5 G/DL (ref 6.4–8.2)
RBC # BLD AUTO: 4.02 MILLION/UL (ref 3.81–5.12)
SODIUM SERPL-SCNC: 138 MMOL/L (ref 136–145)
T4 FREE SERPL-MCNC: 0.98 NG/DL (ref 0.76–1.46)
TRIGL SERPL-MCNC: 70 MG/DL
TSH SERPL DL<=0.05 MIU/L-ACNC: 3.25 UIU/ML (ref 0.36–3.74)
WBC # BLD AUTO: 6.84 THOUSAND/UL (ref 4.31–10.16)

## 2021-01-12 PROCEDURE — 84443 ASSAY THYROID STIM HORMONE: CPT

## 2021-01-12 PROCEDURE — 84439 ASSAY OF FREE THYROXINE: CPT

## 2021-01-12 PROCEDURE — 80053 COMPREHEN METABOLIC PANEL: CPT

## 2021-01-12 PROCEDURE — 85025 COMPLETE CBC W/AUTO DIFF WBC: CPT

## 2021-01-12 PROCEDURE — 80061 LIPID PANEL: CPT

## 2021-01-12 PROCEDURE — 36415 COLL VENOUS BLD VENIPUNCTURE: CPT

## 2021-01-26 ENCOUNTER — IMMUNIZATIONS (OUTPATIENT)
Dept: FAMILY MEDICINE CLINIC | Facility: HOSPITAL | Age: 86
End: 2021-01-26

## 2021-01-26 DIAGNOSIS — Z23 ENCOUNTER FOR IMMUNIZATION: Primary | ICD-10-CM

## 2021-01-26 PROCEDURE — 91301 SARS-COV-2 / COVID-19 MRNA VACCINE (MODERNA) 100 MCG: CPT

## 2021-01-26 PROCEDURE — 0011A SARS-COV-2 / COVID-19 MRNA VACCINE (MODERNA) 100 MCG: CPT

## 2021-02-22 ENCOUNTER — IMMUNIZATIONS (OUTPATIENT)
Dept: FAMILY MEDICINE CLINIC | Facility: HOSPITAL | Age: 86
End: 2021-02-22

## 2021-02-22 DIAGNOSIS — Z23 ENCOUNTER FOR IMMUNIZATION: Primary | ICD-10-CM

## 2021-02-22 PROCEDURE — 0012A SARS-COV-2 / COVID-19 MRNA VACCINE (MODERNA) 100 MCG: CPT

## 2021-02-22 PROCEDURE — 91301 SARS-COV-2 / COVID-19 MRNA VACCINE (MODERNA) 100 MCG: CPT

## 2021-03-31 ENCOUNTER — TELEPHONE (OUTPATIENT)
Dept: INTERNAL MEDICINE CLINIC | Facility: CLINIC | Age: 86
End: 2021-03-31

## 2021-03-31 NOTE — TELEPHONE ENCOUNTER
T c from patient  She feels her BP has returned to normal and wants to d/c her meds because it makes her tired  She is on losartan   Current BP is 145/82  Please advise and call pt back at [phone]

## 2021-03-31 NOTE — TELEPHONE ENCOUNTER
Patient would like to know if she could cut back to 1 pill a day instead of taking 2 pills a day  They are making her very tired  Please advise       Call back #[phone]

## 2021-04-01 NOTE — TELEPHONE ENCOUNTER
Left a detailed message letting bladimir know again not to change her medication to keep it the was it is

## 2021-04-07 DIAGNOSIS — I10 ESSENTIAL HYPERTENSION: ICD-10-CM

## 2021-04-07 RX ORDER — LOSARTAN POTASSIUM 25 MG/1
TABLET ORAL
Qty: 90 TABLET | Refills: 1 | Status: SHIPPED | OUTPATIENT
Start: 2021-04-07 | End: 2021-07-12

## 2021-04-12 ENCOUNTER — TELEPHONE (OUTPATIENT)
Dept: INTERNAL MEDICINE CLINIC | Facility: CLINIC | Age: 86
End: 2021-04-12

## 2021-04-12 DIAGNOSIS — R41.3 MEMORY LOSS: Primary | ICD-10-CM

## 2021-04-12 NOTE — TELEPHONE ENCOUNTER
Patient needs a doctor to doctor order to see a neurologist  She has some memory loss  [address] [facility], Dr Donte Reid  Please call patient when in so she can make an appointment      Call back #[phone]

## 2021-07-10 DIAGNOSIS — I10 ESSENTIAL HYPERTENSION: ICD-10-CM

## 2021-07-12 RX ORDER — LOSARTAN POTASSIUM 25 MG/1
TABLET ORAL
Qty: 180 TABLET | Refills: 1 | Status: SHIPPED | OUTPATIENT
Start: 2021-07-12 | End: 2021-08-09 | Stop reason: SDUPTHER

## 2021-07-22 ENCOUNTER — TELEPHONE (OUTPATIENT)
Dept: NEUROLOGY | Facility: CLINIC | Age: 86
End: 2021-07-22

## 2021-07-22 NOTE — TELEPHONE ENCOUNTER
Patient called and stated that she received a NO SHOW LETTER for 7/13/21  Pt  States that she did call in prior to appointment date and time and CANCELED appointment  She would never not show up and she is very conciencious of her appointments  We apologized and told her we would notify provider and add note in the chart

## 2021-07-22 NOTE — TELEPHONE ENCOUNTER
I spoke w/my practice administrator  She noted that this was not a NO SHOW  She also sent this information to Dr Lobo Estevez as well, so the provider is aware this was not a no show  I called back patient and advised her of all of what I did  Patient was very pleased

## 2021-07-28 ENCOUNTER — RA CDI HCC (OUTPATIENT)
Dept: OTHER | Facility: HOSPITAL | Age: 86
End: 2021-07-28

## 2021-07-28 NOTE — PROGRESS NOTES
[facility]  coding opportunities          Chart reviewed, no opportunity found: CHART REVIEWED, NO OPPORTUNITY FOUND                     Patients insurance company: [employer]

## 2021-08-02 ENCOUNTER — OFFICE VISIT (OUTPATIENT)
Dept: INTERNAL MEDICINE CLINIC | Facility: CLINIC | Age: 86
End: 2021-08-02
Payer: COMMERCIAL

## 2021-08-02 VITALS
SYSTOLIC BLOOD PRESSURE: 134 MMHG | WEIGHT: 110 LBS | OXYGEN SATURATION: 99 % | HEIGHT: 61 IN | DIASTOLIC BLOOD PRESSURE: 80 MMHG | RESPIRATION RATE: 16 BRPM | HEART RATE: 84 BPM | BODY MASS INDEX: 20.77 KG/M2

## 2021-08-02 DIAGNOSIS — E03.9 HYPOTHYROIDISM, ADULT: ICD-10-CM

## 2021-08-02 DIAGNOSIS — I10 ESSENTIAL HYPERTENSION: Primary | ICD-10-CM

## 2021-08-02 DIAGNOSIS — E78.2 MIXED HYPERLIPIDEMIA: ICD-10-CM

## 2021-08-02 PROCEDURE — 99214 OFFICE O/P EST MOD 30 MIN: CPT | Performed by: INTERNAL MEDICINE

## 2021-08-02 NOTE — PROGRESS NOTES
Assessment/Plan:       Appears stable  Continue current medications  Continue follow-up with Neurology  Ordered blood work  Quality Measures:       Return in about 4 months (around 12/2/2021)  No problem-specific Assessment & Plan notes found for this encounter  Diagnoses and all orders for this visit:    Essential hypertension  -     CBC and differential; Future  -     Comprehensive metabolic panel; Future    Hypothyroidism, adult  -     T4, free; Future  -     TSH, 3rd generation; Future    Mixed hyperlipidemia  -     Lipid panel; Future          Subjective:      Patient ID: Lin Kanner is a 80 y o  female  Patient comes in today for follow-up  She states she is doing well  Her blood pressure is controlled  Thyroid levels have been controlled but she is due for blood work  Trying to watch her diet for the cholesterol  No further additions to her history  ALLERGIES:  Allergies   Allergen Reactions    Latex     Molds & Smuts     Penicillins        CURRENT MEDICATIONS:    Current Outpatient Medications:     aspirin (ECOTRIN LOW STRENGTH) 81 mg EC tablet, Take 1 tablet (81 mg total) by mouth daily, Disp: 30 tablet, Rfl: 1    losartan (COZAAR) 25 mg tablet, TAKE 1 TABLET BY MOUTH TWICE A DAY, Disp: 180 tablet, Rfl: 1    ACTIVE PROBLEM LIST:  Patient Active Problem List   Diagnosis    Allergic rhinitis    Chronic insomnia    Hypothyroidism, adult    TIA (transient ischemic attack)    Left carotid bruit    Hyperlipidemia    Dry skin dermatitis    Osteoporosis    Essential hypertension    Memory loss       PAST MEDICAL HISTORY:  History reviewed  No pertinent past medical history      PAST SURGICAL HISTORY:  Past Surgical History:   Procedure Laterality Date    FOOT SURGERY  2007       FAMILY HISTORY:  Family History   Problem Relation Age of Onset   Caroline Nolasco Parkinsonism Mother     Diabetes Father     Hyperlipidemia Father     Hypertension Father    Caroline Nolasco Other Father cardiac Disorder       SOCIAL HISTORY:  Social History     Socioeconomic History    Marital status: /Civil Union     Spouse name: Not on file    Number of children: 2    Years of education: Not on file    Highest education level: Not on file   Occupational History    Occupation: Retired   Tobacco Use    Smoking status: Former Smoker    Smokeless tobacco: Never Used    Tobacco comment: Quit smoking at 21years of age   [de-identified] Use    Vaping Use: Never used   Substance and Sexual Activity    Alcohol use: Yes     Comment: occasional    Drug use: No    Sexual activity: Never     Partners: Male   Other Topics Concern    Not on file   Social History Narrative    Caffeine use    Graduated from high school    Lives with adult children    Lives with     Denied: History of High risk sexual behavior     Social Determinants of Health     Financial Resource Strain:     Difficulty of Paying Living Expenses:    Food Insecurity:     Worried About Running Out of Food in the Last Year:     [address] in the Last Year:    Transportation Needs:     Lack of Transportation (Medical):  Lack of Transportation (Non-Medical):    Physical Activity:     Days of Exercise per Week:     Minutes of Exercise per Session:    Stress:     Feeling of Stress :    Social Connections:     Frequency of Communication with Friends and Family:     Frequency of Social Gatherings with Friends and Family:     Attends [employer] Services:     Active Member of Clubs or Organizations:     Attends Club or Organization Meetings:     Marital Status:    Intimate Partner Violence:     Fear of Current or Ex-Partner:     Emotionally Abused:     Physically Abused:     Sexually Abused:        Review of Systems   Respiratory: Negative for shortness of breath  Cardiovascular: Negative for chest pain  Gastrointestinal: Negative for abdominal pain           Objective:  Vitals:    08/02/21 1539   BP: 134/80   BP Location: Left arm   Patient Position: Sitting   Cuff Size: Standard   Pulse: 84   Resp: 16   SpO2: 99%   Weight: 49 9 kg (110 lb)   Height: 5' 1" (1 549 m)     Body mass index is 20 78 kg/m²  Physical Exam  Vitals and nursing note reviewed  Constitutional:       Appearance: She is well-developed  Cardiovascular:      Rate and Rhythm: Normal rate and regular rhythm  Heart sounds: Normal heart sounds  Pulmonary:      Effort: Pulmonary effort is normal       Breath sounds: Normal breath sounds  Abdominal:      Palpations: Abdomen is soft  Tenderness: There is no abdominal tenderness  Neurological:      Mental Status: She is alert and oriented to person, place, and time  RESULTS:    No results found for this or any previous visit (from the past 1008 hour(s))  This note was created with voice recognition software  Phonic, grammatical and spelling errors may be present within the note as a result

## 2021-08-06 ENCOUNTER — APPOINTMENT (OUTPATIENT)
Dept: LAB | Facility: HOSPITAL | Age: 86
End: 2021-08-06
Attending: INTERNAL MEDICINE
Payer: COMMERCIAL

## 2021-08-06 DIAGNOSIS — I10 ESSENTIAL HYPERTENSION: ICD-10-CM

## 2021-08-06 DIAGNOSIS — E78.2 MIXED HYPERLIPIDEMIA: ICD-10-CM

## 2021-08-06 DIAGNOSIS — E03.9 HYPOTHYROIDISM, ADULT: ICD-10-CM

## 2021-08-06 LAB
ALBUMIN SERPL BCP-MCNC: 3.6 G/DL (ref 3.5–5)
ALP SERPL-CCNC: 71 U/L (ref 46–116)
ALT SERPL W P-5'-P-CCNC: 16 U/L (ref 12–78)
ANION GAP SERPL CALCULATED.3IONS-SCNC: 8 MMOL/L (ref 4–13)
AST SERPL W P-5'-P-CCNC: 19 U/L (ref 5–45)
BASOPHILS # BLD AUTO: 0.05 THOUSANDS/ΜL (ref 0–0.1)
BASOPHILS NFR BLD AUTO: 1 % (ref 0–1)
BILIRUB SERPL-MCNC: 0.41 MG/DL (ref 0.2–1)
BUN SERPL-MCNC: 16 MG/DL (ref 5–25)
CALCIUM SERPL-MCNC: 8.8 MG/DL (ref 8.3–10.1)
CHLORIDE SERPL-SCNC: 106 MMOL/L (ref 100–108)
CHOLEST SERPL-MCNC: 368 MG/DL (ref 50–200)
CO2 SERPL-SCNC: 29 MMOL/L (ref 21–32)
CREAT SERPL-MCNC: 0.74 MG/DL (ref 0.6–1.3)
EOSINOPHIL # BLD AUTO: 0.13 THOUSAND/ΜL (ref 0–0.61)
EOSINOPHIL NFR BLD AUTO: 2 % (ref 0–6)
ERYTHROCYTE [DISTWIDTH] IN BLOOD BY AUTOMATED COUNT: 13.2 % (ref 11.6–15.1)
GFR SERPL CREATININE-BSD FRML MDRD: 73 ML/MIN/1.73SQ M
GLUCOSE P FAST SERPL-MCNC: 93 MG/DL (ref 65–99)
HCT VFR BLD AUTO: 39.5 % (ref 34.8–46.1)
HDLC SERPL-MCNC: 98 MG/DL
HGB BLD-MCNC: 12.6 G/DL (ref 11.5–15.4)
IMM GRANULOCYTES # BLD AUTO: 0.01 THOUSAND/UL (ref 0–0.2)
IMM GRANULOCYTES NFR BLD AUTO: 0 % (ref 0–2)
LDLC SERPL CALC-MCNC: 260 MG/DL (ref 0–100)
LYMPHOCYTES # BLD AUTO: 2.21 THOUSANDS/ΜL (ref 0.6–4.47)
LYMPHOCYTES NFR BLD AUTO: 34 % (ref 14–44)
MCH RBC QN AUTO: 29.6 PG (ref 26.8–34.3)
MCHC RBC AUTO-ENTMCNC: 31.9 G/DL (ref 31.4–37.4)
MCV RBC AUTO: 93 FL (ref 82–98)
MONOCYTES # BLD AUTO: 0.48 THOUSAND/ΜL (ref 0.17–1.22)
MONOCYTES NFR BLD AUTO: 7 % (ref 4–12)
NEUTROPHILS # BLD AUTO: 3.71 THOUSANDS/ΜL (ref 1.85–7.62)
NEUTS SEG NFR BLD AUTO: 56 % (ref 43–75)
NONHDLC SERPL-MCNC: 270 MG/DL
NRBC BLD AUTO-RTO: 0 /100 WBCS
PLATELET # BLD AUTO: 222 THOUSANDS/UL (ref 149–390)
PMV BLD AUTO: 11.1 FL (ref 8.9–12.7)
POTASSIUM SERPL-SCNC: 4.4 MMOL/L (ref 3.5–5.3)
PROT SERPL-MCNC: 7.3 G/DL (ref 6.4–8.2)
RBC # BLD AUTO: 4.25 MILLION/UL (ref 3.81–5.12)
SODIUM SERPL-SCNC: 143 MMOL/L (ref 136–145)
T4 FREE SERPL-MCNC: 0.85 NG/DL (ref 0.76–1.46)
TRIGL SERPL-MCNC: 50 MG/DL
TSH SERPL DL<=0.05 MIU/L-ACNC: 2.71 UIU/ML (ref 0.36–3.74)
WBC # BLD AUTO: 6.59 THOUSAND/UL (ref 4.31–10.16)

## 2021-08-06 PROCEDURE — 84439 ASSAY OF FREE THYROXINE: CPT

## 2021-08-06 PROCEDURE — 85025 COMPLETE CBC W/AUTO DIFF WBC: CPT

## 2021-08-06 PROCEDURE — 84443 ASSAY THYROID STIM HORMONE: CPT

## 2021-08-06 PROCEDURE — 80061 LIPID PANEL: CPT

## 2021-08-06 PROCEDURE — 36415 COLL VENOUS BLD VENIPUNCTURE: CPT

## 2021-08-06 PROCEDURE — 80053 COMPREHEN METABOLIC PANEL: CPT

## 2021-08-08 ENCOUNTER — HOSPITAL ENCOUNTER (EMERGENCY)
Facility: HOSPITAL | Age: 86
Discharge: HOME | End: 2021-08-08
Attending: EMERGENCY MEDICINE | Admitting: EMERGENCY MEDICINE
Payer: COMMERCIAL

## 2021-08-08 ENCOUNTER — APPOINTMENT (EMERGENCY)
Dept: CT IMAGING | Facility: HOSPITAL | Age: 86
End: 2021-08-08
Payer: COMMERCIAL

## 2021-08-08 VITALS
OXYGEN SATURATION: 99 % | DIASTOLIC BLOOD PRESSURE: 70 MMHG | RESPIRATION RATE: 18 BRPM | HEART RATE: 71 BPM | TEMPERATURE: 97.6 F | SYSTOLIC BLOOD PRESSURE: 157 MMHG

## 2021-08-08 DIAGNOSIS — I10 HYPERTENSION: Primary | ICD-10-CM

## 2021-08-08 LAB
ALBUMIN SERPL BCP-MCNC: 3.7 G/DL (ref 3.5–5)
ALP SERPL-CCNC: 82 U/L (ref 46–116)
ALT SERPL W P-5'-P-CCNC: 15 U/L (ref 12–78)
ANION GAP SERPL CALCULATED.3IONS-SCNC: 10 MMOL/L (ref 4–13)
AST SERPL W P-5'-P-CCNC: 17 U/L (ref 5–45)
BASOPHILS # BLD AUTO: 0.06 THOUSANDS/ΜL (ref 0–0.1)
BASOPHILS NFR BLD AUTO: 1 % (ref 0–1)
BILIRUB SERPL-MCNC: 0.28 MG/DL (ref 0.2–1)
BUN SERPL-MCNC: 14 MG/DL (ref 5–25)
CALCIUM SERPL-MCNC: 8.6 MG/DL (ref 8.3–10.1)
CHLORIDE SERPL-SCNC: 105 MMOL/L (ref 100–108)
CO2 SERPL-SCNC: 27 MMOL/L (ref 21–32)
CREAT SERPL-MCNC: 0.83 MG/DL (ref 0.6–1.3)
EOSINOPHIL # BLD AUTO: 0.11 THOUSAND/ΜL (ref 0–0.61)
EOSINOPHIL NFR BLD AUTO: 1 % (ref 0–6)
ERYTHROCYTE [DISTWIDTH] IN BLOOD BY AUTOMATED COUNT: 13.2 % (ref 11.6–15.1)
GFR SERPL CREATININE-BSD FRML MDRD: 64 ML/MIN/1.73SQ M
GLUCOSE SERPL-MCNC: 140 MG/DL (ref 65–140)
HCT VFR BLD AUTO: 38.6 % (ref 34.8–46.1)
HGB BLD-MCNC: 12.2 G/DL (ref 11.5–15.4)
IMM GRANULOCYTES # BLD AUTO: 0.01 THOUSAND/UL (ref 0–0.2)
IMM GRANULOCYTES NFR BLD AUTO: 0 % (ref 0–2)
LYMPHOCYTES # BLD AUTO: 2.43 THOUSANDS/ΜL (ref 0.6–4.47)
LYMPHOCYTES NFR BLD AUTO: 31 % (ref 14–44)
MCH RBC QN AUTO: 29.1 PG (ref 26.8–34.3)
MCHC RBC AUTO-ENTMCNC: 31.6 G/DL (ref 31.4–37.4)
MCV RBC AUTO: 92 FL (ref 82–98)
MONOCYTES # BLD AUTO: 0.59 THOUSAND/ΜL (ref 0.17–1.22)
MONOCYTES NFR BLD AUTO: 8 % (ref 4–12)
NEUTROPHILS # BLD AUTO: 4.57 THOUSANDS/ΜL (ref 1.85–7.62)
NEUTS SEG NFR BLD AUTO: 59 % (ref 43–75)
NRBC BLD AUTO-RTO: 0 /100 WBCS
PLATELET # BLD AUTO: 216 THOUSANDS/UL (ref 149–390)
PMV BLD AUTO: 11.3 FL (ref 8.9–12.7)
POTASSIUM SERPL-SCNC: 3.9 MMOL/L (ref 3.5–5.3)
PROT SERPL-MCNC: 7.3 G/DL (ref 6.4–8.2)
RBC # BLD AUTO: 4.19 MILLION/UL (ref 3.81–5.12)
SODIUM SERPL-SCNC: 142 MMOL/L (ref 136–145)
TROPONIN I SERPL-MCNC: <0.02 NG/ML
WBC # BLD AUTO: 7.77 THOUSAND/UL (ref 4.31–10.16)

## 2021-08-08 PROCEDURE — 93005 ELECTROCARDIOGRAM TRACING: CPT

## 2021-08-08 PROCEDURE — 99284 EMERGENCY DEPT VISIT MOD MDM: CPT

## 2021-08-08 PROCEDURE — 99285 EMERGENCY DEPT VISIT HI MDM: CPT | Performed by: NURSE PRACTITIONER

## 2021-08-08 PROCEDURE — 84484 ASSAY OF TROPONIN QUANT: CPT | Performed by: EMERGENCY MEDICINE

## 2021-08-08 PROCEDURE — 36415 COLL VENOUS BLD VENIPUNCTURE: CPT

## 2021-08-08 PROCEDURE — 85025 COMPLETE CBC W/AUTO DIFF WBC: CPT | Performed by: EMERGENCY MEDICINE

## 2021-08-08 PROCEDURE — 80053 COMPREHEN METABOLIC PANEL: CPT | Performed by: EMERGENCY MEDICINE

## 2021-08-08 PROCEDURE — 70450 CT HEAD/BRAIN W/O DYE: CPT

## 2021-08-09 ENCOUNTER — TELEPHONE (OUTPATIENT)
Dept: INTERNAL MEDICINE CLINIC | Facility: CLINIC | Age: 86
End: 2021-08-09

## 2021-08-09 ENCOUNTER — OFFICE VISIT (OUTPATIENT)
Dept: INTERNAL MEDICINE CLINIC | Facility: CLINIC | Age: 86
End: 2021-08-09
Payer: COMMERCIAL

## 2021-08-09 VITALS
SYSTOLIC BLOOD PRESSURE: 160 MMHG | OXYGEN SATURATION: 96 % | BODY MASS INDEX: 20.77 KG/M2 | TEMPERATURE: 98 F | HEIGHT: 61 IN | WEIGHT: 110 LBS | HEART RATE: 80 BPM | DIASTOLIC BLOOD PRESSURE: 80 MMHG

## 2021-08-09 DIAGNOSIS — I10 ESSENTIAL HYPERTENSION: Primary | ICD-10-CM

## 2021-08-09 DIAGNOSIS — R51.9 ACUTE NONINTRACTABLE HEADACHE, UNSPECIFIED HEADACHE TYPE: ICD-10-CM

## 2021-08-09 PROCEDURE — 99214 OFFICE O/P EST MOD 30 MIN: CPT | Performed by: INTERNAL MEDICINE

## 2021-08-09 RX ORDER — LOSARTAN POTASSIUM 50 MG/1
50 TABLET ORAL 2 TIMES DAILY
Qty: 60 TABLET | Refills: 3 | Status: SHIPPED | OUTPATIENT
Start: 2021-08-09 | End: 2021-12-14

## 2021-08-09 NOTE — PROGRESS NOTES
Assessment/Plan:       Will increase her medicine for now and see how she does  If the headaches in the back of her head continue, also consider her neck may be the ultimate cause  Quality Measures:       Return in about 2 weeks (around 8/23/2021) for Recheck  No problem-specific Assessment & Plan notes found for this encounter  Diagnoses and all orders for this visit:    Essential hypertension  -     losartan (COZAAR) 50 mg tablet; Take 1 tablet (50 mg total) by mouth 2 (two) times a day          Subjective:      Patient ID: Betsy Osuna is a 80 y o  female  Patient comes in today for ER follow-up  She had gone to the ER because of elevated blood pressure and the back of her head hurting  Workup in the ER was negative and she had already on her own taken another losartan  She did not do that today in the pressure is slightly high again  But her neck is not bothering her presently  No headache        ALLERGIES:  Allergies   Allergen Reactions    Latex     Molds & Smuts     Penicillins        CURRENT MEDICATIONS:    Current Outpatient Medications:     aspirin (ECOTRIN LOW STRENGTH) 81 mg EC tablet, Take 1 tablet (81 mg total) by mouth daily, Disp: 30 tablet, Rfl: 1    losartan (COZAAR) 50 mg tablet, Take 1 tablet (50 mg total) by mouth 2 (two) times a day, Disp: 60 tablet, Rfl: 3    ACTIVE PROBLEM LIST:  Patient Active Problem List   Diagnosis    Allergic rhinitis    Chronic insomnia    Hypothyroidism, adult    TIA (transient ischemic attack)    Left carotid bruit    Hyperlipidemia    Dry skin dermatitis    Osteoporosis    Essential hypertension    Memory loss       PAST MEDICAL HISTORY:  Past Medical History:   Diagnosis Date    TIA (transient ischemic attack)        PAST SURGICAL HISTORY:  Past Surgical History:   Procedure Laterality Date    FOOT SURGERY  2007       FAMILY HISTORY:  Family History   Problem Relation Age of Onset    Parkinsonism Mother     Diabetes Father     Hyperlipidemia Father     Hypertension Father     Other Father         cardiac Disorder       SOCIAL HISTORY:  Social History     Socioeconomic History    Marital status: /Civil Union     Spouse name: Not on file    Number of children: 2    Years of education: Not on file    Highest education level: Not on file   Occupational History    Occupation: Retired   Tobacco Use    Smoking status: Former Smoker    Smokeless tobacco: Never Used    Tobacco comment: Quit smoking at 21years of age   [de-identified] Use    Vaping Use: Never used   Substance and Sexual Activity    Alcohol use: Yes     Comment: occasional    Drug use: No    Sexual activity: Never     Partners: Male   Other Topics Concern    Not on file   Social History Narrative    Caffeine use    Graduated from high school    Lives with adult children    Lives with     Denied: History of High risk sexual behavior     Social Determinants of Health     Financial Resource Strain:     Difficulty of Paying Living Expenses:    Food Insecurity:     Worried About Running Out of Food in the Last Year:     [address] in the Last Year:    Transportation Needs:     Lack of Transportation (Medical):  Lack of Transportation (Non-Medical):    Physical Activity:     Days of Exercise per Week:     Minutes of Exercise per Session:    Stress:     Feeling of Stress :    Social Connections:     Frequency of Communication with Friends and Family:     Frequency of Social Gatherings with Friends and Family:     Attends Temple Services:     Active Member of Clubs or Organizations:     Attends Club or Organization Meetings:     Marital Status:    Intimate Partner Violence:     Fear of Current or Ex-Partner:     Emotionally Abused:     Physically Abused:     Sexually Abused:        Review of Systems   Respiratory: Negative for shortness of breath  Cardiovascular: Negative for chest pain     Gastrointestinal: Negative for abdominal pain  Objective:  Vitals:    08/09/21 1344   BP: 160/80   BP Location: Left arm   Patient Position: Sitting   Cuff Size: Large   Pulse: 80   Temp: 98 °F (36 7 °C)   TempSrc: Tympanic   SpO2: 96%   Weight: 49 9 kg (110 lb)   Height: 5' 1" (1 549 m)     Body mass index is 20 78 kg/m²  Physical Exam  Vitals and nursing note reviewed  Constitutional:       Appearance: She is well-developed  Cardiovascular:      Rate and Rhythm: Normal rate and regular rhythm  Heart sounds: Normal heart sounds  Pulmonary:      Effort: Pulmonary effort is normal       Breath sounds: Normal breath sounds  Abdominal:      Palpations: Abdomen is soft  Tenderness: There is no abdominal tenderness  Neurological:      General: No focal deficit present  Mental Status: She is alert  Mental status is at baseline             RESULTS:    Recent Results (from the past 1008 hour(s))   CBC and differential    Collection Time: 08/06/21  8:24 AM   Result Value Ref Range    WBC 6 59 4 31 - 10 16 Thousand/uL    RBC 4 25 3 81 - 5 12 Million/uL    Hemoglobin 12 6 11 5 - 15 4 g/dL    Hematocrit 39 5 34 8 - 46 1 %    MCV 93 82 - 98 fL    MCH 29 6 26 8 - 34 3 pg    MCHC 31 9 31 4 - 37 4 g/dL    RDW 13 2 11 6 - 15 1 %    MPV 11 1 8 9 - 12 7 fL    Platelets 004 131 - 555 Thousands/uL    nRBC 0 /100 WBCs    Neutrophils Relative 56 43 - 75 %    Immat GRANS % 0 0 - 2 %    Lymphocytes Relative 34 14 - 44 %    Monocytes Relative 7 4 - 12 %    Eosinophils Relative 2 0 - 6 %    Basophils Relative 1 0 - 1 %    Neutrophils Absolute 3 71 1 85 - 7 62 Thousands/µL    Immature Grans Absolute 0 01 0 00 - 0 20 Thousand/uL    Lymphocytes Absolute 2 21 0 60 - 4 47 Thousands/µL    Monocytes Absolute 0 48 0 17 - 1 22 Thousand/µL    Eosinophils Absolute 0 13 0 00 - 0 61 Thousand/µL    Basophils Absolute 0 05 0 00 - 0 10 Thousands/µL   Comprehensive metabolic panel    Collection Time: 08/06/21  8:24 AM   Result Value Ref Range    Sodium 143 136 - 145 mmol/L    Potassium 4 4 3 5 - 5 3 mmol/L    Chloride 106 100 - 108 mmol/L    CO2 29 21 - 32 mmol/L    ANION GAP 8 4 - 13 mmol/L    BUN 16 5 - 25 mg/dL    Creatinine 0 74 0 60 - 1 30 mg/dL    Glucose, Fasting 93 65 - 99 mg/dL    Calcium 8 8 8 3 - 10 1 mg/dL    AST 19 5 - 45 U/L    ALT 16 12 - 78 U/L    Alkaline Phosphatase 71 46 - 116 U/L    Total Protein 7 3 6 4 - 8 2 g/dL    Albumin 3 6 3 5 - 5 0 g/dL    Total Bilirubin 0 41 0 20 - 1 00 mg/dL    eGFR 73 ml/min/1 73sq m   Lipid panel    Collection Time: 08/06/21  8:24 AM   Result Value Ref Range    Cholesterol 368 (H) 50 - 200 mg/dL    Triglycerides 50 <=150 mg/dL    HDL, Direct 98 >=40 mg/dL    LDL Calculated 260 (H) 0 - 100 mg/dL    Non-HDL-Chol (CHOL-HDL) 270 mg/dl   T4, free    Collection Time: 08/06/21  8:24 AM   Result Value Ref Range    Free T4 0 85 0 76 - 1 46 ng/dL   TSH, 3rd generation    Collection Time: 08/06/21  8:24 AM   Result Value Ref Range    TSH 3RD GENERATON 2 713 0 358 - 3 740 uIU/mL   CBC and differential    Collection Time: 08/08/21  6:05 PM   Result Value Ref Range    WBC 7 77 4 31 - 10 16 Thousand/uL    RBC 4 19 3 81 - 5 12 Million/uL    Hemoglobin 12 2 11 5 - 15 4 g/dL    Hematocrit 38 6 34 8 - 46 1 %    MCV 92 82 - 98 fL    MCH 29 1 26 8 - 34 3 pg    MCHC 31 6 31 4 - 37 4 g/dL    RDW 13 2 11 6 - 15 1 %    MPV 11 3 8 9 - 12 7 fL    Platelets 084 878 - 007 Thousands/uL    nRBC 0 /100 WBCs    Neutrophils Relative 59 43 - 75 %    Immat GRANS % 0 0 - 2 %    Lymphocytes Relative 31 14 - 44 %    Monocytes Relative 8 4 - 12 %    Eosinophils Relative 1 0 - 6 %    Basophils Relative 1 0 - 1 %    Neutrophils Absolute 4 57 1 85 - 7 62 Thousands/µL    Immature Grans Absolute 0 01 0 00 - 0 20 Thousand/uL    Lymphocytes Absolute 2 43 0 60 - 4 47 Thousands/µL    Monocytes Absolute 0 59 0 17 - 1 22 Thousand/µL    Eosinophils Absolute 0 11 0 00 - 0 61 Thousand/µL    Basophils Absolute 0 06 0 00 - 0 10 Thousands/µL   Comprehensive metabolic panel    Collection Time: 08/08/21  7:38 PM   Result Value Ref Range    Sodium 142 136 - 145 mmol/L    Potassium 3 9 3 5 - 5 3 mmol/L    Chloride 105 100 - 108 mmol/L    CO2 27 21 - 32 mmol/L    ANION GAP 10 4 - 13 mmol/L    BUN 14 5 - 25 mg/dL    Creatinine 0 83 0 60 - 1 30 mg/dL    Glucose 140 65 - 140 mg/dL    Calcium 8 6 8 3 - 10 1 mg/dL    AST 17 5 - 45 U/L    ALT 15 12 - 78 U/L    Alkaline Phosphatase 82 46 - 116 U/L    Total Protein 7 3 6 4 - 8 2 g/dL    Albumin 3 7 3 5 - 5 0 g/dL    Total Bilirubin 0 28 0 20 - 1 00 mg/dL    eGFR 64 ml/min/1 73sq m   Troponin I    Collection Time: 08/08/21  7:39 PM   Result Value Ref Range    Troponin I <0 02 <=0 04 ng/mL       This note was created with voice recognition software  Phonic, grammatical and spelling errors may be present within the note as a result

## 2021-08-09 NOTE — ED PROVIDER NOTES
History  Chief Complaint   Patient presents with    Hypertension     Patient reports hypertension, headache and dizziness starting around 3pm     43-year-old female presents here with a chief complaint of headache and high blood pressure  She reports that around mid afternoon she noted these symptoms and took additional Cozaar is feeling much better at this time  No shortness of breath no chest pain  Hypertension  Associated symptoms: headaches    Associated symptoms: no abdominal pain, no anxiety, no chest pain, no confusion, no dizziness, no ear pain, no epistaxis, no fatigue, no fever, no palpitations, no shortness of breath and not vomiting        Prior to Admission Medications   Prescriptions Last Dose Informant Patient Reported? Taking?   aspirin (ECOTRIN LOW STRENGTH) 81 mg EC tablet   No No   Sig: Take 1 tablet (81 mg total) by mouth daily   losartan (COZAAR) 25 mg tablet   No No   Sig: TAKE 1 TABLET BY MOUTH TWICE A DAY      Facility-Administered Medications: None       Past Medical History:   Diagnosis Date    TIA (transient ischemic attack)        Past Surgical History:   Procedure Laterality Date    FOOT SURGERY  2007       Family History   Problem Relation Age of Onset    Parkinsonism Mother     Diabetes Father     Hyperlipidemia Father     Hypertension Father     Other Father         cardiac Disorder     I have reviewed and agree with the history as documented      E-Cigarette/Vaping    E-Cigarette Use Never User      E-Cigarette/Vaping Substances    Nicotine No     THC No     CBD No     Flavoring No     Other No     Unknown No      Social History     Tobacco Use    Smoking status: Former Smoker    Smokeless tobacco: Never Used    Tobacco comment: Quit smoking at 21years of age   [de-identified] Use    Vaping Use: Never used   Substance Use Topics    Alcohol use: Yes     Comment: occasional    Drug use: No       Review of Systems   Constitutional: Negative for diaphoresis, fatigue and fever  HENT: Negative for congestion, ear pain, nosebleeds and sore throat  Eyes: Negative for photophobia, pain, discharge and visual disturbance  Respiratory: Negative for cough, choking, chest tightness, shortness of breath and wheezing  Cardiovascular: Negative for chest pain and palpitations  Gastrointestinal: Negative for abdominal distention, abdominal pain, diarrhea and vomiting  Genitourinary: Negative for dysuria, flank pain and frequency  Musculoskeletal: Negative for back pain, gait problem and joint swelling  Skin: Negative for color change and rash  Neurological: Positive for headaches  Negative for dizziness and syncope  Psychiatric/Behavioral: Negative for behavioral problems and confusion  The patient is not nervous/anxious  All other systems reviewed and are negative  Physical Exam  Physical Exam  Vitals and nursing note reviewed  Constitutional:       General: She is not in acute distress  Appearance: She is well-developed  She is not ill-appearing or toxic-appearing  HENT:      Head: Normocephalic and atraumatic  Mouth/Throat:      Dentition: Normal dentition  Eyes:      General:         Right eye: No discharge  Left eye: No discharge  Cardiovascular:      Rate and Rhythm: Normal rate and regular rhythm  Pulmonary:      Effort: Pulmonary effort is normal  No accessory muscle usage or respiratory distress  Abdominal:      General: There is no distension  Tenderness: There is no guarding  Musculoskeletal:         General: Normal range of motion  Cervical back: Normal range of motion and neck supple  Skin:     General: Skin is warm and dry  Neurological:      Mental Status: She is alert and oriented to person, place, and time  Coordination: Coordination normal    Psychiatric:         Behavior: Behavior is cooperative           Vital Signs  ED Triage Vitals [08/08/21 1753]   Temperature Pulse Respirations Blood Pressure SpO2   97 6 °F (36 4 °C) 90 20 (!) 236/114 98 %      Temp Source Heart Rate Source Patient Position - Orthostatic VS BP Location FiO2 (%)   Temporal Monitor Sitting Left arm --      Pain Score       --           Vitals:    08/08/21 1753 08/08/21 1929 08/08/21 2045   BP: (!) 236/114 (!) 214/97 (!) 178/84   Pulse: 90 92 75   Patient Position - Orthostatic VS: Sitting Lying          Visual Acuity      ED Medications  Medications - No data to display    Diagnostic Studies  Results Reviewed     Procedure Component Value Units Date/Time    Troponin I [[id number]]  (Normal) Collected: 08/08/21 1939    Lab Status: Final result Specimen: Blood from Arm, Left Updated: 08/08/21 2007     Troponin I <0 02 ng/mL     Comprehensive metabolic panel [[id number]] Collected: 08/08/21 1938    Lab Status: Final result Specimen: Blood from Arm, Left Updated: 08/08/21 2004     Sodium 142 mmol/L      Potassium 3 9 mmol/L      Chloride 105 mmol/L      CO2 27 mmol/L      ANION GAP 10 mmol/L      BUN 14 mg/dL      Creatinine 0 83 mg/dL      Glucose 140 mg/dL      Calcium 8 6 mg/dL      AST 17 U/L      ALT 15 U/L      Alkaline Phosphatase 82 U/L      Total Protein 7 3 g/dL      Albumin 3 7 g/dL      Total Bilirubin 0 28 mg/dL      eGFR 64 ml/min/1 73sq m     Narrative:      Jodi guidelines for Chronic Kidney Disease (CKD):     Stage 1 with normal or high GFR (GFR > 90 mL/min/1 73 square meters)    Stage 2 Mild CKD (GFR = 60-89 mL/min/1 73 square meters)    Stage 3A Moderate CKD (GFR = 45-59 mL/min/1 73 square meters)    Stage 3B Moderate CKD (GFR = 30-44 mL/min/1 73 square meters)    Stage 4 Severe CKD (GFR = 15-29 mL/min/1 73 square meters)    Stage 5 End Stage CKD (GFR <15 mL/min/1 73 square meters)  Note: GFR calculation is accurate only with a steady state creatinine    CBC and differential [[id number]] Collected: 08/08/21 1805    Lab Status: Final result Specimen: Blood from Arm, Left Updated: 08/08/21 1816     WBC 7 77 Thousand/uL      RBC 4 19 Million/uL      Hemoglobin 12 2 g/dL      Hematocrit 38 6 %      MCV 92 fL      MCH 29 1 pg      MCHC 31 6 g/dL      RDW 13 2 %      MPV 11 3 fL      Platelets 869 Thousands/uL      nRBC 0 /100 WBCs      Neutrophils Relative 59 %      Immat GRANS % 0 %      Lymphocytes Relative 31 %      Monocytes Relative 8 %      Eosinophils Relative 1 %      Basophils Relative 1 %      Neutrophils Absolute 4 57 Thousands/µL      Immature Grans Absolute 0 01 Thousand/uL      Lymphocytes Absolute 2 43 Thousands/µL      Monocytes Absolute 0 59 Thousand/µL      Eosinophils Absolute 0 11 Thousand/µL      Basophils Absolute 0 06 Thousands/µL                  CT head without contrast   Final Result by Jeannette Clement DO (08/08 2138)      No acute intracranial abnormality  Microangiopathic changes                    Workstation performed: [id number]                    Procedures  ECG 12 Lead Documentation Only    Date/Time: 8/8/2021 9:43 PM  Performed by: PAXTON Dixon  Authorized by: PAXTON Dixon     Indications / Diagnosis:  Hypertension  ECG reviewed by me, the ED Provider: yes    Patient location:  ED  Previous ECG:     Previous ECG:  Compared to current    Comparison ECG info:  December 2019    Similarity:  No change  Interpretation:     Interpretation: normal    Rate:     ECG rate:  80    ECG rate assessment: normal    Rhythm:     Rhythm: sinus rhythm    Ectopy:     Ectopy: none    QRS:     QRS axis:  Normal  Conduction:     Conduction: normal    ST segments:     ST segments:  Normal  T waves:     T waves: normal               ED Course             HEART Risk Score      Most Recent Value   Heart Score Risk Calculator   History  0 Filed at: 08/08/2021 2145   ECG  0 Filed at: 08/08/2021 2145   Age  2 Filed at: 08/08/2021 2145   Risk Factors  1 Filed at: 08/08/2021 2145   Troponin  0 Filed at: 08/08/2021 2145   HEART Score  3 Filed at: 08/08/2021 2145 MDM  Number of Diagnoses or Management Options     Amount and/or Complexity of Data Reviewed  Clinical lab tests: ordered and reviewed  Tests in the radiology section of CPT®: reviewed and ordered  Tests in the medicine section of CPT®: reviewed and ordered  Independent visualization of images, tracings, or specimens: yes        Disposition  Final diagnoses:   Hypertension     Time reflects when diagnosis was documented in both MDM as applicable and the Disposition within this note     Time User Action Codes Description Comment    8/8/2021  9:45 PM Keren Springer [facility] Hypertension       ED Disposition     ED Disposition Condition Date/Time Comment    Discharge Stable Sun Aug 8, 2021  9:45 PM Oswaldo Patrick discharge to home/self care  Follow-up Information     Follow up With Specialties Details Why Contact Info    Mary Armstrong MD Internal Medicine Call   [place] [phone]            Patient's Medications   Discharge Prescriptions    No medications on file     No discharge procedures on file      PDMP Review     None          ED Provider  Electronically Signed by           Gerhardt Cree, CRNP  08/08/21 3107

## 2021-08-13 ENCOUNTER — TELEPHONE (OUTPATIENT)
Dept: INTERNAL MEDICINE CLINIC | Facility: CLINIC | Age: 86
End: 2021-08-13

## 2021-08-13 NOTE — TELEPHONE ENCOUNTER
Patient is taking losartan 50 mg in the AM and 50 MG in the PM right before bed  Since starting medication she has been up urinating frequently  Believes it is from medication  This morning her BP was  161/80  She does drink a lot of water  She would like to know what she continue with medication, change how she takes it, be on something different? Please advise        Call back #[phone]

## 2021-08-15 LAB
ATRIAL RATE: 80 BPM
P AXIS: 59 DEGREES
PR INTERVAL: 168 MS
QRS AXIS: 43 DEGREES
QRSD INTERVAL: 90 MS
QT INTERVAL: 388 MS
QTC INTERVAL: 447 MS
T WAVE AXIS: 40 DEGREES
VENTRICULAR RATE: 80 BPM

## 2021-08-15 PROCEDURE — 93010 ELECTROCARDIOGRAM REPORT: CPT | Performed by: INTERNAL MEDICINE

## 2021-08-23 ENCOUNTER — OFFICE VISIT (OUTPATIENT)
Dept: INTERNAL MEDICINE CLINIC | Facility: CLINIC | Age: 86
End: 2021-08-23
Payer: COMMERCIAL

## 2021-08-23 VITALS
OXYGEN SATURATION: 98 % | HEART RATE: 86 BPM | SYSTOLIC BLOOD PRESSURE: 128 MMHG | HEIGHT: 61 IN | WEIGHT: 109 LBS | DIASTOLIC BLOOD PRESSURE: 76 MMHG | BODY MASS INDEX: 20.58 KG/M2

## 2021-08-23 DIAGNOSIS — I10 ESSENTIAL HYPERTENSION: Primary | ICD-10-CM

## 2021-08-23 PROCEDURE — 99213 OFFICE O/P EST LOW 20 MIN: CPT | Performed by: INTERNAL MEDICINE

## 2021-08-23 NOTE — PROGRESS NOTES
Assessment/Plan:       Appears to be back on track  Her machine at home looks like it runs a little high  Encouraged them to bring it with them for her next visit  Continue current medication and call with any problems  Quality Measures:       Return for Next scheduled follow up  No problem-specific Assessment & Plan notes found for this encounter  Diagnoses and all orders for this visit:    Essential hypertension          Subjective:      Patient ID: Consuelo Zuniga is a 80 y o  female  Patient comes in today for follow-up of her pressure  Her pressure is doing better now  She feels better  Interestingly, urinating last night since the medicine was adjusted  Her machine at home running high but lower than before  No side effects on the medicine  No new complaints today        ALLERGIES:  Allergies   Allergen Reactions    Latex     Molds & Smuts     Penicillins        CURRENT MEDICATIONS:    Current Outpatient Medications:     aspirin (ECOTRIN LOW STRENGTH) 81 mg EC tablet, Take 1 tablet (81 mg total) by mouth daily, Disp: 30 tablet, Rfl: 1    losartan (COZAAR) 50 mg tablet, Take 1 tablet (50 mg total) by mouth 2 (two) times a day, Disp: 60 tablet, Rfl: 3    ACTIVE PROBLEM LIST:  Patient Active Problem List   Diagnosis    Allergic rhinitis    Chronic insomnia    Hypothyroidism, adult    TIA (transient ischemic attack)    Left carotid bruit    Hyperlipidemia    Dry skin dermatitis    Osteoporosis    Essential hypertension    Memory loss       PAST MEDICAL HISTORY:  Past Medical History:   Diagnosis Date    TIA (transient ischemic attack)        PAST SURGICAL HISTORY:  Past Surgical History:   Procedure Laterality Date    FOOT SURGERY  2007       FAMILY HISTORY:  Family History   Problem Relation Age of Onset    Parkinsonism Mother     Diabetes Father     Hyperlipidemia Father     Hypertension Father     Other Father         cardiac Disorder       SOCIAL HISTORY:  Social History     Socioeconomic History    Marital status: /Civil Union     Spouse name: Not on file    Number of children: 2    Years of education: Not on file    Highest education level: Not on file   Occupational History    Occupation: Retired   Tobacco Use    Smoking status: Former Smoker    Smokeless tobacco: Never Used    Tobacco comment: Quit smoking at 21years of age   [de-identified] Use    Vaping Use: Never used   Substance and Sexual Activity    Alcohol use: Yes     Comment: occasional    Drug use: No    Sexual activity: Never     Partners: Male   Other Topics Concern    Not on file   Social History Narrative    Caffeine use    Graduated from high school    Lives with adult children    Lives with     Denied: History of High risk sexual behavior     Social Determinants of Health     Financial Resource Strain:     Difficulty of Paying Living Expenses:    Food Insecurity:     Worried About Running Out of Food in the Last Year:     [address] in the Last Year:    Transportation Needs:     Lack of Transportation (Medical):  Lack of Transportation (Non-Medical):    Physical Activity:     Days of Exercise per Week:     Minutes of Exercise per Session:    Stress:     Feeling of Stress :    Social Connections:     Frequency of Communication with Friends and Family:     Frequency of Social Gatherings with Friends and Family:     Attends Worship Services:     Active Member of Clubs or Organizations:     Attends Club or Organization Meetings:     Marital Status:    Intimate Partner Violence:     Fear of Current or Ex-Partner:     Emotionally Abused:     Physically Abused:     Sexually Abused:        Review of Systems   Respiratory: Negative for shortness of breath  Cardiovascular: Negative for chest pain  Gastrointestinal: Negative for abdominal pain           Objective:  Vitals:    08/23/21 1025   BP: 128/76   BP Location: Left arm   Patient Position: Sitting   Cuff Size: Adult   Pulse: 86   SpO2: 98%   Weight: 49 4 kg (109 lb)   Height: 5' 1" (1 549 m)     Body mass index is 20 6 kg/m²  Physical Exam  Vitals and nursing note reviewed  Constitutional:       Appearance: She is well-developed  Cardiovascular:      Rate and Rhythm: Normal rate and regular rhythm  Heart sounds: Normal heart sounds  Pulmonary:      Effort: Pulmonary effort is normal       Breath sounds: Normal breath sounds  Abdominal:      Palpations: Abdomen is soft  Tenderness: There is no abdominal tenderness  Neurological:      Mental Status: She is alert and oriented to person, place, and time             RESULTS:    Recent Results (from the past 1008 hour(s))   CBC and differential    Collection Time: 08/06/21  8:24 AM   Result Value Ref Range    WBC 6 59 4 31 - 10 16 Thousand/uL    RBC 4 25 3 81 - 5 12 Million/uL    Hemoglobin 12 6 11 5 - 15 4 g/dL    Hematocrit 39 5 34 8 - 46 1 %    MCV 93 82 - 98 fL    MCH 29 6 26 8 - 34 3 pg    MCHC 31 9 31 4 - 37 4 g/dL    RDW 13 2 11 6 - 15 1 %    MPV 11 1 8 9 - 12 7 fL    Platelets 840 966 - 720 Thousands/uL    nRBC 0 /100 WBCs    Neutrophils Relative 56 43 - 75 %    Immat GRANS % 0 0 - 2 %    Lymphocytes Relative 34 14 - 44 %    Monocytes Relative 7 4 - 12 %    Eosinophils Relative 2 0 - 6 %    Basophils Relative 1 0 - 1 %    Neutrophils Absolute 3 71 1 85 - 7 62 Thousands/µL    Immature Grans Absolute 0 01 0 00 - 0 20 Thousand/uL    Lymphocytes Absolute 2 21 0 60 - 4 47 Thousands/µL    Monocytes Absolute 0 48 0 17 - 1 22 Thousand/µL    Eosinophils Absolute 0 13 0 00 - 0 61 Thousand/µL    Basophils Absolute 0 05 0 00 - 0 10 Thousands/µL   Comprehensive metabolic panel    Collection Time: 08/06/21  8:24 AM   Result Value Ref Range    Sodium 143 136 - 145 mmol/L    Potassium 4 4 3 5 - 5 3 mmol/L    Chloride 106 100 - 108 mmol/L    CO2 29 21 - 32 mmol/L    ANION GAP 8 4 - 13 mmol/L    BUN 16 5 - 25 mg/dL    Creatinine 0 74 0 60 - 1 30 mg/dL    Glucose, Fasting 93 65 - 99 mg/dL    Calcium 8 8 8 3 - 10 1 mg/dL    AST 19 5 - 45 U/L    ALT 16 12 - 78 U/L    Alkaline Phosphatase 71 46 - 116 U/L    Total Protein 7 3 6 4 - 8 2 g/dL    Albumin 3 6 3 5 - 5 0 g/dL    Total Bilirubin 0 41 0 20 - 1 00 mg/dL    eGFR 73 ml/min/1 73sq m   Lipid panel    Collection Time: 08/06/21  8:24 AM   Result Value Ref Range    Cholesterol 368 (H) 50 - 200 mg/dL    Triglycerides 50 <=150 mg/dL    HDL, Direct 98 >=40 mg/dL    LDL Calculated 260 (H) 0 - 100 mg/dL    Non-HDL-Chol (CHOL-HDL) 270 mg/dl   T4, free    Collection Time: 08/06/21  8:24 AM   Result Value Ref Range    Free T4 0 85 0 76 - 1 46 ng/dL   TSH, 3rd generation    Collection Time: 08/06/21  8:24 AM   Result Value Ref Range    TSH 3RD GENERATON 2 713 0 358 - 3 740 uIU/mL   ECG 12 lead    Collection Time: 08/08/21  5:59 PM   Result Value Ref Range    Ventricular Rate 80 BPM    Atrial Rate 80 BPM    MT Interval 168 ms    QRSD Interval 90 ms    QT Interval 388 ms    QTC Interval 447 ms    P [place] 59 degrees    QRS Axis 43 degrees    T Wave Axis 40 degrees   CBC and differential    Collection Time: 08/08/21  6:05 PM   Result Value Ref Range    WBC 7 77 4 31 - 10 16 Thousand/uL    RBC 4 19 3 81 - 5 12 Million/uL    Hemoglobin 12 2 11 5 - 15 4 g/dL    Hematocrit 38 6 34 8 - 46 1 %    MCV 92 82 - 98 fL    MCH 29 1 26 8 - 34 3 pg    MCHC 31 6 31 4 - 37 4 g/dL    RDW 13 2 11 6 - 15 1 %    MPV 11 3 8 9 - 12 7 fL    Platelets 916 089 - 624 Thousands/uL    nRBC 0 /100 WBCs    Neutrophils Relative 59 43 - 75 %    Immat GRANS % 0 0 - 2 %    Lymphocytes Relative 31 14 - 44 %    Monocytes Relative 8 4 - 12 %    Eosinophils Relative 1 0 - 6 %    Basophils Relative 1 0 - 1 %    Neutrophils Absolute 4 57 1 85 - 7 62 Thousands/µL    Immature Grans Absolute 0 01 0 00 - 0 20 Thousand/uL    Lymphocytes Absolute 2 43 0 60 - 4 47 Thousands/µL    Monocytes Absolute 0 59 0 17 - 1 22 Thousand/µL    Eosinophils Absolute 0 11 0 00 - 0 61 Thousand/µL    Basophils Absolute 0 06 0 00 - 0 10 Thousands/µL   Comprehensive metabolic panel    Collection Time: 08/08/21  7:38 PM   Result Value Ref Range    Sodium 142 136 - 145 mmol/L    Potassium 3 9 3 5 - 5 3 mmol/L    Chloride 105 100 - 108 mmol/L    CO2 27 21 - 32 mmol/L    ANION GAP 10 4 - 13 mmol/L    BUN 14 5 - 25 mg/dL    Creatinine 0 83 0 60 - 1 30 mg/dL    Glucose 140 65 - 140 mg/dL    Calcium 8 6 8 3 - 10 1 mg/dL    AST 17 5 - 45 U/L    ALT 15 12 - 78 U/L    Alkaline Phosphatase 82 46 - 116 U/L    Total Protein 7 3 6 4 - 8 2 g/dL    Albumin 3 7 3 5 - 5 0 g/dL    Total Bilirubin 0 28 0 20 - 1 00 mg/dL    eGFR 64 ml/min/1 73sq m   Troponin I    Collection Time: 08/08/21  7:39 PM   Result Value Ref Range    Troponin I <0 02 <=0 04 ng/mL       This note was created with voice recognition software  Phonic, grammatical and spelling errors may be present within the note as a result

## 2021-09-27 ENCOUNTER — CLINICAL SUPPORT (OUTPATIENT)
Dept: INTERNAL MEDICINE CLINIC | Facility: CLINIC | Age: 86
End: 2021-09-27
Payer: COMMERCIAL

## 2021-09-27 DIAGNOSIS — Z23 NEED FOR VACCINATION: Primary | ICD-10-CM

## 2021-09-27 PROCEDURE — 90662 IIV NO PRSV INCREASED AG IM: CPT

## 2021-09-27 PROCEDURE — G0008 ADMIN INFLUENZA VIRUS VAC: HCPCS

## 2021-10-27 ENCOUNTER — IMMUNIZATIONS (OUTPATIENT)
Dept: FAMILY MEDICINE CLINIC | Facility: HOSPITAL | Age: 86
End: 2021-10-27

## 2021-10-27 DIAGNOSIS — Z23 ENCOUNTER FOR IMMUNIZATION: Primary | ICD-10-CM

## 2021-12-06 ENCOUNTER — OFFICE VISIT (OUTPATIENT)
Dept: INTERNAL MEDICINE CLINIC | Facility: CLINIC | Age: 86
End: 2021-12-06
Payer: COMMERCIAL

## 2021-12-06 VITALS
HEART RATE: 83 BPM | TEMPERATURE: 98 F | SYSTOLIC BLOOD PRESSURE: 148 MMHG | BODY MASS INDEX: 20.97 KG/M2 | DIASTOLIC BLOOD PRESSURE: 82 MMHG | WEIGHT: 111 LBS | OXYGEN SATURATION: 99 %

## 2021-12-06 DIAGNOSIS — I10 ESSENTIAL HYPERTENSION: Primary | ICD-10-CM

## 2021-12-06 DIAGNOSIS — G45.9 TIA (TRANSIENT ISCHEMIC ATTACK): ICD-10-CM

## 2021-12-06 PROCEDURE — 1101F PT FALLS ASSESS-DOCD LE1/YR: CPT | Performed by: INTERNAL MEDICINE

## 2021-12-06 PROCEDURE — 3288F FALL RISK ASSESSMENT DOCD: CPT | Performed by: INTERNAL MEDICINE

## 2021-12-06 PROCEDURE — 3725F SCREEN DEPRESSION PERFORMED: CPT | Performed by: INTERNAL MEDICINE

## 2021-12-06 PROCEDURE — 99214 OFFICE O/P EST MOD 30 MIN: CPT | Performed by: INTERNAL MEDICINE

## 2021-12-13 ENCOUNTER — TELEPHONE (OUTPATIENT)
Dept: INTERNAL MEDICINE CLINIC | Facility: CLINIC | Age: 86
End: 2021-12-13

## 2021-12-14 ENCOUNTER — OFFICE VISIT (OUTPATIENT)
Dept: INTERNAL MEDICINE CLINIC | Facility: CLINIC | Age: 86
End: 2021-12-14
Payer: COMMERCIAL

## 2021-12-14 ENCOUNTER — TELEPHONE (OUTPATIENT)
Dept: HEMATOLOGY ONCOLOGY | Facility: CLINIC | Age: 86
End: 2021-12-14

## 2021-12-14 VITALS
WEIGHT: 111 LBS | OXYGEN SATURATION: 96 % | TEMPERATURE: 98.2 F | SYSTOLIC BLOOD PRESSURE: 142 MMHG | DIASTOLIC BLOOD PRESSURE: 84 MMHG | HEART RATE: 86 BPM | BODY MASS INDEX: 20.97 KG/M2

## 2021-12-14 DIAGNOSIS — N64.9 ABNORMAL NIPPLE: Primary | ICD-10-CM

## 2021-12-14 PROCEDURE — 99214 OFFICE O/P EST MOD 30 MIN: CPT | Performed by: INTERNAL MEDICINE

## 2021-12-20 PROBLEM — N64.4 BREAST PAIN, RIGHT: Status: ACTIVE | Noted: 2021-12-20

## 2021-12-22 ENCOUNTER — TELEPHONE (OUTPATIENT)
Dept: HEMATOLOGY ONCOLOGY | Facility: CLINIC | Age: 86
End: 2021-12-22

## 2021-12-22 ENCOUNTER — CONSULT (OUTPATIENT)
Dept: SURGICAL ONCOLOGY | Facility: CLINIC | Age: 86
End: 2021-12-22
Payer: COMMERCIAL

## 2021-12-22 VITALS
BODY MASS INDEX: 20.96 KG/M2 | WEIGHT: 111 LBS | DIASTOLIC BLOOD PRESSURE: 86 MMHG | OXYGEN SATURATION: 100 % | HEART RATE: 52 BPM | SYSTOLIC BLOOD PRESSURE: 152 MMHG | RESPIRATION RATE: 14 BRPM | TEMPERATURE: 95.8 F | HEIGHT: 61 IN

## 2021-12-22 DIAGNOSIS — N64.4 BREAST PAIN, RIGHT: Primary | ICD-10-CM

## 2021-12-22 DIAGNOSIS — N64.9 ABNORMAL NIPPLE: ICD-10-CM

## 2021-12-22 PROCEDURE — 1160F RVW MEDS BY RX/DR IN RCRD: CPT | Performed by: SURGERY

## 2021-12-22 PROCEDURE — 99203 OFFICE O/P NEW LOW 30 MIN: CPT | Performed by: SURGERY

## 2022-01-06 ENCOUNTER — HOSPITAL ENCOUNTER (OUTPATIENT)
Dept: ULTRASOUND IMAGING | Facility: CLINIC | Age: 87
Discharge: HOME | End: 2022-01-06
Payer: COMMERCIAL

## 2022-01-06 ENCOUNTER — HOSPITAL ENCOUNTER (OUTPATIENT)
Dept: MAMMOGRAPHY | Facility: CLINIC | Age: 87
Discharge: HOME | End: 2022-01-06
Payer: COMMERCIAL

## 2022-01-06 VITALS — WEIGHT: 111 LBS | HEIGHT: 61 IN | BODY MASS INDEX: 20.96 KG/M2

## 2022-01-06 DIAGNOSIS — N64.4 BREAST PAIN, RIGHT: ICD-10-CM

## 2022-01-06 DIAGNOSIS — N64.9 ABNORMAL NIPPLE: ICD-10-CM

## 2022-01-06 PROCEDURE — 76642 ULTRASOUND BREAST LIMITED: CPT

## 2022-01-06 PROCEDURE — 77066 DX MAMMO INCL CAD BI: CPT

## 2022-01-06 PROCEDURE — G0279 TOMOSYNTHESIS, MAMMO: HCPCS

## 2022-01-07 ENCOUNTER — TELEPHONE (OUTPATIENT)
Dept: OTHER | Facility: OTHER | Age: 87
End: 2022-01-07

## 2022-01-07 NOTE — TELEPHONE ENCOUNTER
Patient called and cancel her appointment because she is unable to make the appointment and will call back the office to reschedule her appointment

## 2022-05-06 ENCOUNTER — OFFICE VISIT (OUTPATIENT)
Dept: INTERNAL MEDICINE CLINIC | Facility: CLINIC | Age: 87
End: 2022-05-06
Payer: COMMERCIAL

## 2022-05-06 VITALS
DIASTOLIC BLOOD PRESSURE: 72 MMHG | HEIGHT: 61 IN | WEIGHT: 106.6 LBS | SYSTOLIC BLOOD PRESSURE: 154 MMHG | TEMPERATURE: 98.3 F | HEART RATE: 90 BPM | BODY MASS INDEX: 20.12 KG/M2 | OXYGEN SATURATION: 97 % | RESPIRATION RATE: 14 BRPM

## 2022-05-06 DIAGNOSIS — Z00.00 ENCOUNTER FOR MEDICARE ANNUAL WELLNESS EXAM: Primary | ICD-10-CM

## 2022-05-06 DIAGNOSIS — I10 ESSENTIAL HYPERTENSION: ICD-10-CM

## 2022-05-06 DIAGNOSIS — R41.3 MEMORY LOSS: ICD-10-CM

## 2022-05-06 DIAGNOSIS — E78.2 MIXED HYPERLIPIDEMIA: ICD-10-CM

## 2022-05-06 DIAGNOSIS — E03.9 HYPOTHYROIDISM, ADULT: ICD-10-CM

## 2022-05-06 PROCEDURE — 3725F SCREEN DEPRESSION PERFORMED: CPT | Performed by: PHYSICIAN ASSISTANT

## 2022-05-06 PROCEDURE — 1170F FXNL STATUS ASSESSED: CPT | Performed by: PHYSICIAN ASSISTANT

## 2022-05-06 PROCEDURE — 3288F FALL RISK ASSESSMENT DOCD: CPT | Performed by: PHYSICIAN ASSISTANT

## 2022-05-06 PROCEDURE — G0439 PPPS, SUBSEQ VISIT: HCPCS | Performed by: PHYSICIAN ASSISTANT

## 2022-05-06 PROCEDURE — 99214 OFFICE O/P EST MOD 30 MIN: CPT | Performed by: PHYSICIAN ASSISTANT

## 2022-05-06 PROCEDURE — 1125F AMNT PAIN NOTED PAIN PRSNT: CPT | Performed by: PHYSICIAN ASSISTANT

## 2022-05-06 RX ORDER — ATORVASTATIN CALCIUM 80 MG/1
80 TABLET, FILM COATED ORAL DAILY
COMMUNITY
Start: 2022-03-19 | End: 2022-07-19 | Stop reason: SDUPTHER

## 2022-05-06 RX ORDER — LOSARTAN POTASSIUM 50 MG/1
50 TABLET ORAL 2 TIMES DAILY
Qty: 60 TABLET | Refills: 3 | Status: SHIPPED | OUTPATIENT
Start: 2022-05-06

## 2022-05-06 NOTE — PROGRESS NOTES
Assessment/Plan:   Continue  Current medication  Have labs done and we will review the results with you when available  Follow-up with specialist as you are doing  We will refer you back to Neurology for follow-up  Schedule follow-up here in 4 months, sooner as needed  Quality Measures:   Depression Screening and Follow-up Plan: Patient was screened for depression during today's encounter  They screened negative with a PHQ-2 score of 0  Return in about 4 months (around 9/6/2022) for Next scheduled follow up-Dr Crhis Silver  Diagnoses and all orders for this visit:    Encounter for Medicare annual wellness exam    Hypothyroidism, adult  -     T4, free; Future  -     TSH, 3rd generation; Future    Essential hypertension  -     CBC and differential; Future  -     Comprehensive metabolic panel; Future  -     losartan (COZAAR) 50 mg tablet; Take 1 tablet (50 mg total) by mouth 2 (two) times a day    Mixed hyperlipidemia  -     Lipid panel; Future    Memory loss  -     Ambulatory referral to Neurology; Future    Other orders  -     atorvastatin (LIPITOR) 80 mg tablet; Take 80 mg by mouth daily (Patient not taking: Reported on 5/6/2022 )          Subjective:      Patient ID: Connie Dickson is a 80 y o  female  Follow-up, patient present with her , patient acknowledges that her memory is poor  Patient confirms but states this is not causing any difficulty at home   informs me that patient had 2 syncopal episodes while they were in [place] for the winter  He is not aware of any specific findings on hospital workup  When they returned they have seen their cardiologist, and the cardiologist currently has a Holter monitor in place on the patient  At this time she is not complaining of any headache, dizziness, palpitations, chest pain  She does note some lightheaded sensation on positional change  History of hypothyroidism:  Will need to check labs      Hyperlipidemia:  I have learned through  that patient was taking 2 of her atorvastatin 80 mg tablets daily  She had gotten her medications confused as she was to be taking the losartan 50 mg twice daily  This high dose of atorvastatin could have been making her legs week, contributing to her dementia, and this contributing to her to recent falls  Hypertension:  Better blood pressure for me today however patient admits she did not take her morning losartan dose  No complaint of chest pain or palpitations or shortness of breath, no leg edema  Memory loss: This is been an issue for some time  In fact patient had been seen by Neurology, Exelon patches had been prescribed but patient did not use them  Patient and  at this time admit that a repeat neurology visit would be a good idea  Patient uses many different methods she can to try to fool you to believe she does not have any dementia        ALLERGIES:  Allergies   Allergen Reactions    Latex     Molds & Smuts     Penicillins        CURRENT MEDICATIONS:    Current Outpatient Medications:     aspirin (ECOTRIN LOW STRENGTH) 81 mg EC tablet, Take 1 tablet (81 mg total) by mouth daily, Disp: 30 tablet, Rfl: 1    losartan (COZAAR) 50 mg tablet, Take 1 tablet (50 mg total) by mouth 2 (two) times a day, Disp: 60 tablet, Rfl: 3    atorvastatin (LIPITOR) 80 mg tablet, Take 80 mg by mouth daily (Patient not taking: Reported on 5/6/2022 ), Disp: , Rfl:     ACTIVE PROBLEM LIST:  Patient Active Problem List   Diagnosis    Allergic rhinitis    Chronic insomnia    Hypothyroidism, adult    TIA (transient ischemic attack)    Left carotid bruit    Hyperlipidemia    Dry skin dermatitis    Osteoporosis    Essential hypertension    Memory loss    Breast pain, right       PAST MEDICAL HISTORY:  Past Medical History:   Diagnosis Date    TIA (transient ischemic attack)        PAST SURGICAL HISTORY:  Past Surgical History:   Procedure Laterality Date    FOOT SURGERY  2007 FAMILY HISTORY:  Family History   Problem Relation Age of Onset   [facility] Parkinsonism Mother     Diabetes Father     Hyperlipidemia Father     Hypertension Father     Other Father         cardiac Disorder    No Known Problems Maternal Grandmother     No Known Problems Maternal Grandfather     No Known Problems Paternal Grandmother     No Known Problems Paternal Grandfather     No Known Problems Sister     Cancer Maternal Aunt     Breast cancer Maternal Aunt         age unknown    No Known Problems Daughter     No Known Problems Son        SOCIAL HISTORY:  Social History     Socioeconomic History    Marital status: /Civil Union     Spouse name: Not on file    Number of children: 2    Years of education: Not on file    Highest education level: Not on file   Occupational History    Occupation: Retired   Tobacco Use    Smoking status: Former Smoker     Types: Cigarettes     Start date:      Quit date:      Years since quittin 3    Smokeless tobacco: Never Used    Tobacco comment: Quit smoking at 21years of age   [de-identified] Use    Vaping Use: Never used   Substance and Sexual Activity    Alcohol use: Yes     Comment: occasional    Drug use: No    Sexual activity: Not Currently     Partners: Male   Other Topics Concern    Not on file   Social History Narrative    Caffeine use    Graduated from high school    Lives with adult children    Lives with     Denied: History of High risk sexual behavior     Social Determinants of Health     Financial Resource Strain: Not on file   Food Insecurity: Not on file   Transportation Needs: Not on file   Physical Activity: Not on file   Stress: Not on file   Social Connections: Not on file   Intimate Partner Violence: Not on file   Housing Stability: Not on file       Review of Systems   Constitutional: Positive for fatigue  Negative for activity change, chills and fever  HENT: Negative for congestion      Eyes: Negative for discharge  Respiratory: Negative for cough, chest tightness and shortness of breath  Cardiovascular: Negative for chest pain, palpitations and leg swelling  Gastrointestinal: Negative for abdominal pain, blood in stool, constipation, diarrhea, nausea and vomiting  Endocrine: Negative for polydipsia, polyphagia and polyuria  Genitourinary: Negative for difficulty urinating  Musculoskeletal: Negative for arthralgias and myalgias  Skin: Negative for rash  Allergic/Immunologic: Negative for immunocompromised state  Neurological: Positive for light-headedness  Negative for dizziness, syncope, weakness and headaches  Hematological: Negative for adenopathy  Does not bruise/bleed easily  Psychiatric/Behavioral: Positive for confusion  Negative for dysphoric mood, sleep disturbance and suicidal ideas  The patient is not nervous/anxious  Objective:  Vitals:    05/06/22 1037 05/06/22 1123   BP: (!) 186/90 154/72   BP Location: Left arm Left arm   Patient Position: Sitting Sitting   Cuff Size: Adult    Pulse: 90    Resp: 14    Temp: 98 3 °F (36 8 °C)    TempSrc: Tympanic    SpO2: 97%    Weight: 48 4 kg (106 lb 9 6 oz)    Height: 5' 1" (1 549 m)      Body mass index is 20 14 kg/m²  Physical Exam  Vitals and nursing note reviewed  Constitutional:       General: She is not in acute distress  Appearance: She is well-developed  She is not ill-appearing  Comments: Thin, elderly female   HENT:      Head: Normocephalic and atraumatic  Eyes:      Extraocular Movements: Extraocular movements intact  Pupils: Pupils are equal, round, and reactive to light  Neck:      Thyroid: No thyromegaly  Vascular: No carotid bruit or JVD  Cardiovascular:      Rate and Rhythm: Normal rate and regular rhythm  Heart sounds: Normal heart sounds  Pulmonary:      Effort: Pulmonary effort is normal  No respiratory distress  Breath sounds: Normal breath sounds     Musculoskeletal: Cervical back: Neck supple  Right lower leg: No edema  Left lower leg: No edema  Lymphadenopathy:      Cervical: No cervical adenopathy  Skin:     General: Skin is warm and dry  Findings: No rash  Neurological:      General: No focal deficit present  Mental Status: She is alert  Mental status is at baseline  Cranial Nerves: No cranial nerve deficit  Motor: No weakness  Coordination: Coordination normal       Gait: Gait normal    Psychiatric:         Mood and Affect: Mood normal          Behavior: Behavior normal            RESULTS:    No results found for this or any previous visit (from the past 1008 hour(s))  This note was created with voice recognition software  Phonic, grammatical and spelling errors may be present within the note as a result

## 2022-05-06 NOTE — PROGRESS NOTES
Assessment and Plan:   Continue  Current medication  Have labs done and we will review the results with you when available  Follow-up with specialist as you are doing  We will refer you back to Neurology for follow-up  Schedule follow-up here in 4 months, sooner as needed  Problem List Items Addressed This Visit        Endocrine    Hypothyroidism, adult    Relevant Orders    T4, free    TSH, 3rd generation       Cardiovascular and Mediastinum    Essential hypertension    Relevant Medications    losartan (COZAAR) 50 mg tablet    Other Relevant Orders    CBC and differential    Comprehensive metabolic panel       Other    Hyperlipidemia    Relevant Medications    atorvastatin (LIPITOR) 80 mg tablet    Other Relevant Orders    Lipid panel    Memory loss    Relevant Orders    Ambulatory referral to Neurology      Other Visit Diagnoses     Encounter for Medicare annual wellness exam    -  Primary          Depression Screening and Follow-up Plan: Patient was screened for depression during today's encounter  They screened negative with a PHQ-2 score of 0  Preventive health issues were discussed with patient, and age appropriate screening tests were ordered as noted in patient's After Visit Summary  Personalized health advice and appropriate referrals for health education or preventive services given if needed, as noted in patient's After Visit Summary  History of Present Illness:     Patient presents for Medicare Annual Wellness visit  Questionnaire has been reviewed, clarified, and updated with the patient today      Patient Care Team:  Jim Parks MD as PCP - General (Internal Medicine)  MD Mikey Gould MD as Surgeon (Surgical Oncology)     Problem List:     Patient Active Problem List   Diagnosis    Allergic rhinitis    Chronic insomnia    Hypothyroidism, adult    TIA (transient ischemic attack)    Left carotid bruit    Hyperlipidemia    Dry skin dermatitis    Osteoporosis    Essential hypertension    Memory loss    Breast pain, right      Past Medical and Surgical History:     Past Medical History:   Diagnosis Date    TIA (transient ischemic attack)      Past Surgical History:   Procedure Laterality Date    FOOT SURGERY        Family History:     Family History   Problem Relation Age of Onset   Julieann Frankel Parkinsonism Mother     Diabetes Father     Hyperlipidemia Father     Hypertension Father     Other Father         cardiac Disorder    No Known Problems Maternal Grandmother     No Known Problems Maternal Grandfather     No Known Problems Paternal Grandmother     No Known Problems Paternal Grandfather     No Known Problems Sister     Cancer Maternal Aunt     Breast cancer Maternal Aunt         age unknown    No Known Problems Daughter     No Known Problems Son       Social History:     Social History     Socioeconomic History    Marital status: /Civil Union     Spouse name: None    Number of children: 2    Years of education: None    Highest education level: None   Occupational History    Occupation: Retired   Tobacco Use    Smoking status: Former Smoker     Types: Cigarettes     Start date:      Quit date:      Years since quittin 3    Smokeless tobacco: Never Used    Tobacco comment: Quit smoking at 21years of age   [de-identified] Use    Vaping Use: Never used   Substance and Sexual Activity    Alcohol use: Yes     Comment: occasional    Drug use: No    Sexual activity: Not Currently     Partners: Male   Other Topics Concern    None   Social History Narrative    Caffeine use    Graduated from high school    Lives with adult children    Lives with     Denied: History of High risk sexual behavior     Social Determinants of Health     Financial Resource Strain: Not on file   Food Insecurity: Not on file   Transportation Needs: Not on file   Physical Activity: Not on file   Stress: Not on file   Social Connections: Not on file   Intimate Partner Violence: Not on file   Housing Stability: Not on file      Medications and Allergies:     Current Outpatient Medications   Medication Sig Dispense Refill    aspirin (ECOTRIN LOW STRENGTH) 81 mg EC tablet Take 1 tablet (81 mg total) by mouth daily 30 tablet 1    losartan (COZAAR) 50 mg tablet Take 1 tablet (50 mg total) by mouth 2 (two) times a day 60 tablet 3    atorvastatin (LIPITOR) 80 mg tablet Take 80 mg by mouth daily (Patient not taking: Reported on 5/6/2022 )       No current facility-administered medications for this visit  Allergies   Allergen Reactions    Latex     Molds & Smuts     Penicillins       Immunizations:     Immunization History   Administered Date(s) Administered    COVID-19 MODERNA VACC 0 25 ML IM BOOSTER 10/27/2021    COVID-19 MODERNA VACC 0 5 ML IM 01/26/2021, 02/22/2021    INFLUENZA 10/02/2014, 10/02/2014, 10/27/2016, 10/27/2016, 11/19/2017    Influenza Split High Dose Preservative Free IM 11/19/2017    Influenza, high dose seasonal 0 7 mL 10/11/2018, 10/22/2019, 09/11/2020, 09/27/2021    Pneumococcal Conjugate 13-Valent 12/01/2016, 12/01/2016, 12/19/2017    Pneumococcal Polysaccharide PPV23 08/05/2019      Health Maintenance: There are no preventive care reminders to display for this patient  Topic Date Due    DTaP,Tdap,and Td Vaccines (1 - Tdap) Never done      Medicare Health Risk Assessment:     /72 (BP Location: Left arm, Patient Position: Sitting)   Pulse 90   Temp 98 3 °F (36 8 °C) (Tympanic)   Resp 14   Ht 5' 1" (1 549 m)   Wt 48 4 kg (106 lb 9 6 oz)   SpO2 97%   BMI 20 14 kg/m²      Sina Aldridge is here for her Subsequent Wellness visit  Last Medicare Wellness visit information reviewed, patient interviewed and updates made to the record today  Health Risk Assessment:   Patient rates overall health as very good  Patient feels that their physical health rating is slightly worse  Patient is satisfied with their life  Eyesight was rated as same  Hearing was rated as same  Patient feels that their emotional and mental health rating is same  Patients states they are never, rarely angry  Patient states they are often unusually tired/fatigued  Pain experienced in the last 7 days has been none  Patient states that she has experienced no weight loss or gain in last 6 months  2 episodes of fainting while in [place] this winter  Being seen by cardiology  Depression Screening:   PHQ-2 Score: 0      Fall Risk Screening: In the past year, patient has experienced: history of falling in past year    Number of falls: 1  Injured during fall?: No    Feels unsteady when standing or walking?: Yes    Worried about falling?: Yes      Urinary Incontinence Screening:   Patient has not leaked urine accidently in the last six months  Home Safety:  Patient does not have trouble with stairs inside or outside of their home  Patient has working smoke alarms and has working carbon monoxide detector  Home safety hazards include: none  Nutrition:   Current diet is Regular  Medications:   Patient is not currently taking any over-the-counter supplements  Patient is able to manage medications  Activities of Daily Living (ADLs)/Instrumental Activities of Daily Living (IADLs):   Walk and transfer into and out of bed and chair?: Yes  Dress and groom yourself?: Yes    Bathe or shower yourself?: Yes    Feed yourself? Yes  Do your laundry/housekeeping?: Yes  Manage your money, pay your bills and track your expenses?: Yes  Make your own meals?: Yes    Do your own shopping?: Yes    Previous Hospitalizations:   Any hospitalizations or ED visits within the last 12 months?: Yes    How many hospitalizations have you had in the last year?: 1-2    Hospitalization Comments: In [place] for syncopal episodes    Advance Care Planning:   Living will: Yes    Durable POA for healthcare:  Yes    Advanced directive: Yes    End of Life Decisions reviewed with patient: Yes      Comments: Will bring to office    Cognitive Screening:   Provider or family/friend/caregiver concerned regarding cognition?: Yes    Cognition Comments: Will refer to neurology for evaluation, has seen in past   Not taking medication prescribed  PREVENTIVE SCREENINGS      Cardiovascular Screening:    General: Screening Not Indicated and History Lipid Disorder    Due for: Lipid Panel      Diabetes Screening:     General: Screening Current    Due for: Blood Glucose      Colorectal Cancer Screening:     General: Screening Not Indicated      Breast Cancer Screening:     General: Screening Current      Cervical Cancer Screening:    General: Screening Not Indicated      Osteoporosis Screening:    General: Screening Not Indicated and History Osteoporosis      Abdominal Aortic Aneurysm (AAA) Screening:        General: Screening Not Indicated      Lung Cancer Screening:     General: Screening Not Indicated      Hepatitis C Screening:    General: Screening Not Indicated    Screening, Brief Intervention, and Referral to Treatment (SBIRT)    Screening  Typical number of drinks in a day: 0  Typical number of drinks in a week: 0  Interpretation: Low risk drinking behavior  Brief Intervention  Alcohol & drug use screenings were reviewed  No concerns regarding substance use disorder identified         Haileymeagan Kennedy PA-C

## 2022-05-06 NOTE — PATIENT INSTRUCTIONS
Continue  Current medication  Have labs done and we will review the results with you when available  Follow-up with specialist as you are doing  We will refer you back to Neurology for follow-up  Schedule follow-up here in 4 months, sooner as needed  Medicare Preventive Visit Patient Instructions  Thank you for completing your Welcome to Medicare Visit or Medicare Annual Wellness Visit today  Your next wellness visit will be due in one year (5/7/2023)  The screening/preventive services that you may require over the next 5-10 years are detailed below  Some tests may not apply to you based off risk factors and/or age  Screening tests ordered at today's visit but not completed yet may show as past due  Also, please note that scanned in results may not display below  Preventive Screenings:  Service Recommendations Previous Testing/Comments   Colorectal Cancer Screening  * Colonoscopy    * Fecal Occult Blood Test (FOBT)/Fecal Immunochemical Test (FIT)  * Fecal DNA/Cologuard Test  * Flexible Sigmoidoscopy Age: 54-65 years old   Colonoscopy: every 10 years (may be performed more frequently if at higher risk)  OR  FOBT/FIT: every 1 year  OR  Cologuard: every 3 years  OR  Sigmoidoscopy: every 5 years  Screening may be recommended earlier than age 48 if at higher risk for colorectal cancer  Also, an individualized decision between you and your healthcare provider will decide whether screening between the ages of 74-80 would be appropriate  Colonoscopy: Not on file  FOBT/FIT: Not on file  Cologuard: Not on file  Sigmoidoscopy: Not on file    Screening Not Indicated     Breast Cancer Screening Age: 36 years old  Frequency: every 1-2 years  Not required if history of left and right mastectomy Mammogram: 01/06/2022    Screening Current   Cervical Cancer Screening Between the ages of 21-29, pap smear recommended once every 3 years  Between the ages of 33-67, can perform pap smear with HPV co-testing every 5 years  Recommendations may differ for women with a history of total hysterectomy, cervical cancer, or abnormal pap smears in past  Pap Smear: Not on file    Screening Not Indicated   Hepatitis C Screening Once for adults born between 1945 and 1965  More frequently in patients at high risk for Hepatitis C Hep C Antibody: Not on file        Diabetes Screening 1-2 times per year if you're at risk for diabetes or have pre-diabetes Fasting glucose: 93 mg/dL   A1C: No results in last 5 years    Screening Current   Cholesterol Screening Once every 5 years if you don't have a lipid disorder  May order more often based on risk factors  Lipid panel: 08/06/2021    Screening Not Indicated  History Lipid Disorder     Other Preventive Screenings Covered by Medicare:  1  Abdominal Aortic Aneurysm (AAA) Screening: covered once if your at risk  You're considered to be at risk if you have a family history of AAA  2  Lung Cancer Screening: covers low dose CT scan once per year if you meet all of the following conditions: (1) Age 50-69; (2) No signs or symptoms of lung cancer; (3) Current smoker or have quit smoking within the last 15 years; (4) You have a tobacco smoking history of at least 30 pack years (packs per day multiplied by number of years you smoked); (5) You get a written order from a healthcare provider  3  Glaucoma Screening: covered annually if you're considered high risk: (1) You have diabetes OR (2) Family history of glaucoma OR (3)  aged 48 and older OR (3)  American aged 72 and older  3  Osteoporosis Screening: covered every 2 years if you meet one of the following conditions: (1) You're estrogen deficient and at risk for osteoporosis based off medical history and other findings; (2) Have a vertebral abnormality; (3) On glucocorticoid therapy for more than 3 months; (4) Have primary hyperparathyroidism; (5) On osteoporosis medications and need to assess response to drug therapy     · Last bone density test (DXA Scan): 09/21/2017  5  HIV Screening: covered annually if you're between the age of 12-76  Also covered annually if you are younger than 13 and older than 72 with risk factors for HIV infection  For pregnant patients, it is covered up to 3 times per pregnancy  Immunizations:  Immunization Recommendations   Influenza Vaccine Annual influenza vaccination during flu season is recommended for all persons aged >= 6 months who do not have contraindications   Pneumococcal Vaccine (Prevnar and Pneumovax)  * Prevnar = PCV13  * Pneumovax = PPSV23   Adults 25-60 years old: 1-3 doses may be recommended based on certain risk factors  Adults 72 years old: Prevnar (PCV13) vaccine recommended followed by Pneumovax (PPSV23) vaccine  If already received PPSV23 since turning 65, then PCV13 recommended at least one year after PPSV23 dose  Hepatitis B Vaccine 3 dose series if at intermediate or high risk (ex: diabetes, end stage renal disease, liver disease)   Tetanus (Td) Vaccine - COST NOT COVERED BY MEDICARE PART B Following completion of primary series, a booster dose should be given every 10 years to maintain immunity against tetanus  Td may also be given as tetanus wound prophylaxis  Tdap Vaccine - COST NOT COVERED BY MEDICARE PART B Recommended at least once for all adults  For pregnant patients, recommended with each pregnancy  Shingles Vaccine (Shingrix) - COST NOT COVERED BY MEDICARE PART B  2 shot series recommended in those aged 48 and above     Health Maintenance Due:  There are no preventive care reminders to display for this patient  Immunizations Due:      Topic Date Due    DTaP,Tdap,and Td Vaccines (1 - Tdap) Never done     Advance Directives   What are advance directives? Advance directives are legal documents that state your wishes and plans for medical care  These plans are made ahead of time in case you lose your ability to make decisions for yourself   Advance directives can apply to any medical decision, such as the treatments you want, and if you want to donate organs  What are the types of advance directives? There are many types of advance directives, and each state has rules about how to use them  You may choose a combination of any of the following:  · Living will: This is a written record of the treatment you want  You can also choose which treatments you do not want, which to limit, and which to stop at a certain time  This includes surgery, medicine, IV fluid, and tube feedings  · Durable power of  for healthcare [facility]): This is a written record that states who you want to make healthcare choices for you when you are unable to make them for yourself  This person, called a proxy, is usually a family member or a friend  You may choose more than 1 proxy  · Do not resuscitate (DNR) order:  A DNR order is used in case your heart stops beating or you stop breathing  It is a request not to have certain forms of treatment, such as CPR  A DNR order may be included in other types of advance directives  · Medical directive: This covers the care that you want if you are in a coma, near death, or unable to make decisions for yourself  You can list the treatments you want for each condition  Treatment may include pain medicine, surgery, blood transfusions, dialysis, IV or tube feedings, and a ventilator (breathing machine)  · Values history: This document has questions about your views, beliefs, and how you feel and think about life  This information can help others choose the care that you would choose  Why are advance directives important? An advance directive helps you control your care  Although spoken wishes may be used, it is better to have your wishes written down  Spoken wishes can be misunderstood, or not followed  Treatments may be given even if you do not want them  An advance directive may make it easier for your family to make difficult choices about your care  Fall Prevention    Fall prevention  includes ways to make your home and other areas safer  It also includes ways you can move more carefully to prevent a fall  Health conditions that cause changes in your blood pressure, vision, or muscle strength and coordination may increase your risk for falls  Medicines may also increase your risk for falls if they make you dizzy, weak, or sleepy  Fall prevention tips:   · Stand or sit up slowly  · Use assistive devices as directed  · Wear shoes that fit well and have soles that   · Wear a personal alarm  · Stay active  · Manage your medical conditions  Home Safety Tips:  · Add items to prevent falls in the bathroom  · Keep paths clear  · Install bright lights in your home  · Keep items you use often on shelves within reach  · Paint or place reflective tape on the edges of your stairs  © Copyright [employer] 2018 Information is for End User's use only and may not be sold, redistributed or otherwise used for commercial purposes   All illustrations and images included in CareNotes® are the copyrighted property of [employer]  or [address] [place] [address] [employer]

## 2022-05-10 ENCOUNTER — APPOINTMENT (OUTPATIENT)
Dept: LAB | Facility: HOSPITAL | Age: 87
End: 2022-05-10
Payer: COMMERCIAL

## 2022-05-10 DIAGNOSIS — E03.9 HYPOTHYROIDISM, ADULT: ICD-10-CM

## 2022-05-10 DIAGNOSIS — I10 ESSENTIAL HYPERTENSION: ICD-10-CM

## 2022-05-10 DIAGNOSIS — E78.2 MIXED HYPERLIPIDEMIA: ICD-10-CM

## 2022-05-10 LAB
ALBUMIN SERPL BCP-MCNC: 3.9 G/DL (ref 3.5–5)
ALP SERPL-CCNC: 129 U/L (ref 46–116)
ALT SERPL W P-5'-P-CCNC: 35 U/L (ref 12–78)
ANION GAP SERPL CALCULATED.3IONS-SCNC: 9 MMOL/L (ref 4–13)
AST SERPL W P-5'-P-CCNC: 28 U/L (ref 5–45)
BASOPHILS # BLD AUTO: 0.09 THOUSANDS/ΜL (ref 0–0.1)
BASOPHILS NFR BLD AUTO: 1 % (ref 0–1)
BILIRUB SERPL-MCNC: 0.56 MG/DL (ref 0.2–1)
BUN SERPL-MCNC: 20 MG/DL (ref 5–25)
CALCIUM SERPL-MCNC: 9.3 MG/DL (ref 8.3–10.1)
CHLORIDE SERPL-SCNC: 102 MMOL/L (ref 100–108)
CHOLEST SERPL-MCNC: 238 MG/DL
CO2 SERPL-SCNC: 28 MMOL/L (ref 21–32)
CREAT SERPL-MCNC: 0.78 MG/DL (ref 0.6–1.3)
EOSINOPHIL # BLD AUTO: 0.19 THOUSAND/ΜL (ref 0–0.61)
EOSINOPHIL NFR BLD AUTO: 2 % (ref 0–6)
ERYTHROCYTE [DISTWIDTH] IN BLOOD BY AUTOMATED COUNT: 13.7 % (ref 11.6–15.1)
GFR SERPL CREATININE-BSD FRML MDRD: 68 ML/MIN/1.73SQ M
GLUCOSE P FAST SERPL-MCNC: 94 MG/DL (ref 65–99)
HCT VFR BLD AUTO: 38.5 % (ref 34.8–46.1)
HDLC SERPL-MCNC: 84 MG/DL
HGB BLD-MCNC: 12 G/DL (ref 11.5–15.4)
IMM GRANULOCYTES # BLD AUTO: 0.04 THOUSAND/UL (ref 0–0.2)
IMM GRANULOCYTES NFR BLD AUTO: 0 % (ref 0–2)
LDLC SERPL CALC-MCNC: 138 MG/DL (ref 0–100)
LYMPHOCYTES # BLD AUTO: 1.68 THOUSANDS/ΜL (ref 0.6–4.47)
LYMPHOCYTES NFR BLD AUTO: 17 % (ref 14–44)
MCH RBC QN AUTO: 29.3 PG (ref 26.8–34.3)
MCHC RBC AUTO-ENTMCNC: 31.2 G/DL (ref 31.4–37.4)
MCV RBC AUTO: 94 FL (ref 82–98)
MONOCYTES # BLD AUTO: 0.67 THOUSAND/ΜL (ref 0.17–1.22)
MONOCYTES NFR BLD AUTO: 7 % (ref 4–12)
NEUTROPHILS # BLD AUTO: 7.07 THOUSANDS/ΜL (ref 1.85–7.62)
NEUTS SEG NFR BLD AUTO: 73 % (ref 43–75)
NONHDLC SERPL-MCNC: 154 MG/DL
NRBC BLD AUTO-RTO: 0 /100 WBCS
PLATELET # BLD AUTO: 229 THOUSANDS/UL (ref 149–390)
PMV BLD AUTO: 11.4 FL (ref 8.9–12.7)
POTASSIUM SERPL-SCNC: 4.1 MMOL/L (ref 3.5–5.3)
PROT SERPL-MCNC: 7.7 G/DL (ref 6.4–8.2)
RBC # BLD AUTO: 4.1 MILLION/UL (ref 3.81–5.12)
SODIUM SERPL-SCNC: 139 MMOL/L (ref 136–145)
T4 FREE SERPL-MCNC: 0.99 NG/DL (ref 0.76–1.46)
TRIGL SERPL-MCNC: 80 MG/DL
TSH SERPL DL<=0.05 MIU/L-ACNC: 2.2 UIU/ML (ref 0.45–4.5)
WBC # BLD AUTO: 9.74 THOUSAND/UL (ref 4.31–10.16)

## 2022-05-10 PROCEDURE — 84443 ASSAY THYROID STIM HORMONE: CPT

## 2022-05-10 PROCEDURE — 36415 COLL VENOUS BLD VENIPUNCTURE: CPT

## 2022-05-10 PROCEDURE — 80053 COMPREHEN METABOLIC PANEL: CPT

## 2022-05-10 PROCEDURE — 80061 LIPID PANEL: CPT

## 2022-05-10 PROCEDURE — 85025 COMPLETE CBC W/AUTO DIFF WBC: CPT

## 2022-05-10 PROCEDURE — 84439 ASSAY OF FREE THYROXINE: CPT

## 2022-05-16 ENCOUNTER — CONSULT (OUTPATIENT)
Dept: NEUROLOGY | Facility: CLINIC | Age: 87
End: 2022-05-16
Payer: COMMERCIAL

## 2022-05-16 VITALS
DIASTOLIC BLOOD PRESSURE: 70 MMHG | HEART RATE: 81 BPM | TEMPERATURE: 97.8 F | SYSTOLIC BLOOD PRESSURE: 130 MMHG | HEIGHT: 61 IN | OXYGEN SATURATION: 98 % | BODY MASS INDEX: 20.14 KG/M2

## 2022-05-16 DIAGNOSIS — Z86.73 HISTORY OF TIA (TRANSIENT ISCHEMIC ATTACK): ICD-10-CM

## 2022-05-16 DIAGNOSIS — R41.3 MEMORY DIFFICULTY: ICD-10-CM

## 2022-05-16 DIAGNOSIS — I10 ESSENTIAL HYPERTENSION: ICD-10-CM

## 2022-05-16 DIAGNOSIS — E78.2 MIXED HYPERLIPIDEMIA: ICD-10-CM

## 2022-05-16 PROCEDURE — 99215 OFFICE O/P EST HI 40 MIN: CPT | Performed by: PSYCHIATRY & NEUROLOGY

## 2022-05-16 PROCEDURE — 1160F RVW MEDS BY RX/DR IN RCRD: CPT | Performed by: PSYCHIATRY & NEUROLOGY

## 2022-05-16 RX ORDER — RIVASTIGMINE 9.5 MG/24H
1 PATCH, EXTENDED RELEASE TRANSDERMAL DAILY
Qty: 30 PATCH | Refills: 5 | Status: SHIPPED | OUTPATIENT
Start: 2022-05-16 | End: 2022-06-16 | Stop reason: SDUPTHER

## 2022-05-16 RX ORDER — RIVASTIGMINE 4.6 MG/24H
1 PATCH, EXTENDED RELEASE TRANSDERMAL DAILY
Qty: 30 PATCH | Refills: 0 | Status: SHIPPED | OUTPATIENT
Start: 2022-05-16 | End: 2022-06-16 | Stop reason: DRUGHIGH

## 2022-05-16 NOTE — PROGRESS NOTES
Hong Hunt is a 80 y o  female  Chief Complaint   Patient presents with    Memory difficulty       Assessment:  1  Memory difficulty    2  Essential hypertension    3  Mixed hyperlipidemia    4  History of TIA (transient ischemic attack)        Plan:  MRI of the brain with neuro quant  Exelon patch 4 6 mg per 24 hour for 1 month followed by 9 5 mg  Patient to discuss with cardiology and family physician prior to getting the Exelon patch  Mentally stimulating exercises  Follow-up in 4 months    Discussion:  Differential diagnosis discussed with the patient has most likely mild cognitive impairment her [place] is 21/30, she has been advised to have an MRI of the brain with neuro quant, patient to call me after the test to discuss the results, was advised to discuss with her cardiologist and if agreeable to go on Exelon patch 4 6 mg per 24 hour for 1 month followed by 9 5 mg side effects discussed with them in detail  She was also advised mentally stimulating exercises and to be under constant supervision she does not drive she was advised to keep a blood pressure cholesterol sugar under control fall and safety precautions to go to the hospital if has any worsening symptoms and call me otherwise to see me back in 4 months and follow up with her other physicians      Subjective:    HPI   Patient is here accompanied with her  in follow-up for her memory difficulty, she had seen me in 2020 and was advised at that time to have an MRI of the brain with neuro quant and go on Exelon patch patient did not do that and tells me that she has been having some increasing short-term memory difficulty she does not drive she lives with her  she is able to cook without any issues her sleep is good she recently was taking twice the dose of her cholesterol medication and had an episode of passing out and according to the  she has a cardiac monitor and is in follow up with the cardiologist but there was no witnessed seizure activity, she denies having any headaches dizziness no vision or speech difficulty no motor or sensory symptoms in upper or lower extremities the  takes care of the finances her sleep is good she gets about 5 hours of sleep night no hallucinations no bowel and bladder incontinence her appetite is good weight has been stable no other complaints  Vitals:    05/16/22 1221   BP: 130/70   BP Location: Right arm   Patient Position: Sitting   Cuff Size: Adult   Pulse: 81   Temp: 97 8 °F (36 6 °C)   TempSrc: Temporal   SpO2: 98%   Height: 5' 1" (1 549 m)       Current Medications    Current Outpatient Medications:     aspirin (ECOTRIN LOW STRENGTH) 81 mg EC tablet, Take 1 tablet (81 mg total) by mouth daily, Disp: 30 tablet, Rfl: 1    losartan (COZAAR) 50 mg tablet, Take 1 tablet (50 mg total) by mouth 2 (two) times a day, Disp: 60 tablet, Rfl: 3    atorvastatin (LIPITOR) 80 mg tablet, Take 80 mg by mouth daily (Patient not taking: No sig reported), Disp: , Rfl:       Allergies  Latex, Molds & smuts, and Penicillins    Past Medical History  Past Medical History:   Diagnosis Date    TIA (transient ischemic attack)          Past Surgical History:  Past Surgical History:   Procedure Laterality Date    FOOT SURGERY  2007         Family History:  Family History   Problem Relation Age of Onset    Parkinsonism Mother     Diabetes Father     Hyperlipidemia Father     Hypertension Father     Other Father         cardiac Disorder    No Known Problems Maternal Grandmother     No Known Problems Maternal Grandfather     No Known Problems Paternal Grandmother     No Known Problems Paternal Grandfather     No Known Problems Sister     Cancer Maternal Aunt     Breast cancer Maternal Aunt         age unknown    No Known Problems Daughter     No Known Problems Son        Social History:   reports that she quit smoking about 65 years ago  Her smoking use included cigarettes   She started smoking about 70 years ago  She has never used smokeless tobacco  She reports current alcohol use  She reports that she does not use drugs  I have reviewed the past medical history, surgical history, social and family history, current medications, allergies vitals, review of systems, and updated this information as appropriate today  Objective:    Physical Exam    Neurological Exam    GENERAL:  Cooperative in no acute distress  Well-developed and well-nourished    HEAD and NECK   Head is atraumatic normocephalic with no lesions or masses  Neck is supple with full range of motion    CARDIOVASCULAR  Carotid Arteries-no carotid bruits  NEUROLOGIC:  Mental Status-the patient is awake alert and oriented without aphasia or apraxia, [place] is 21/30  Cranial Nerves: Visual fields are full to confrontation  Extraocular movements are full without nystagmus  Pupils are 2-1/2 mm and reactive  Face is symmetrical to light touch  Movements of facial expression move symmetrically  Hearing is normal to finger rub bilaterally  Soft palate lifts symmetrically  Shoulder shrug is symmetrical  Tongue is midline without atrophy  Motor: No drift is noted on arm extension  Strength is full in the upper and lower extremities with normal bulk and tone  Sensory: Intact to temperature and vibratory sensation in the upper and lower extremities bilaterally  Cortical function is intact  Coordination: Finger to nose testing is performed accurately  Romberg is negative  Gait reveals a normal base with symmetrical arm swing  Tandem walk is normal   Reflexes:   2+ and symmetrical    Toes are downgoing          ROS:  Review of Systems   Constitutional: Negative  Negative for appetite change and fever  HENT: Negative  Negative for hearing loss, tinnitus, trouble swallowing and voice change  Eyes: Negative  Negative for photophobia and pain  Respiratory: Negative  Negative for shortness of breath  Cardiovascular: Negative    Negative for palpitations  Gastrointestinal: Negative  Negative for nausea and vomiting  Endocrine: Negative  Negative for cold intolerance  Genitourinary: Positive for frequency  Negative for dysuria and urgency  Musculoskeletal: Negative  Negative for myalgias and neck pain  Skin: Negative  Negative for rash  Neurological: Positive for syncope  Negative for dizziness, tremors, seizures, facial asymmetry, speech difficulty, weakness, light-headedness, numbness and headaches  Patient states she fainted a month or two ago  Hematological: Bruises/bleeds easily  Psychiatric/Behavioral: Positive for confusion  Negative for hallucinations and sleep disturbance

## 2022-05-20 ENCOUNTER — APPOINTMENT (OUTPATIENT)
Dept: LAB | Facility: HOSPITAL | Age: 87
End: 2022-05-20
Payer: COMMERCIAL

## 2022-05-20 DIAGNOSIS — M62.81 MUSCLE WEAKNESS (GENERALIZED): ICD-10-CM

## 2022-05-20 LAB
ALBUMIN SERPL BCP-MCNC: 3.7 G/DL (ref 3.5–5)
ALP SERPL-CCNC: 170 U/L (ref 46–116)
ALT SERPL W P-5'-P-CCNC: 85 U/L (ref 12–78)
ANION GAP SERPL CALCULATED.3IONS-SCNC: 9 MMOL/L (ref 4–13)
AST SERPL W P-5'-P-CCNC: 105 U/L (ref 5–45)
BILIRUB SERPL-MCNC: 0.28 MG/DL (ref 0.2–1)
BUN SERPL-MCNC: 17 MG/DL (ref 5–25)
CALCIUM SERPL-MCNC: 9 MG/DL (ref 8.3–10.1)
CHLORIDE SERPL-SCNC: 104 MMOL/L (ref 100–108)
CK SERPL-CCNC: 59 U/L (ref 26–192)
CO2 SERPL-SCNC: 28 MMOL/L (ref 21–32)
CREAT SERPL-MCNC: 0.72 MG/DL (ref 0.6–1.3)
GFR SERPL CREATININE-BSD FRML MDRD: 75 ML/MIN/1.73SQ M
GLUCOSE P FAST SERPL-MCNC: 106 MG/DL (ref 65–99)
POTASSIUM SERPL-SCNC: 4.1 MMOL/L (ref 3.5–5.3)
PROT SERPL-MCNC: 7.4 G/DL (ref 6.4–8.2)
SODIUM SERPL-SCNC: 141 MMOL/L (ref 136–145)

## 2022-05-20 PROCEDURE — 82550 ASSAY OF CK (CPK): CPT

## 2022-05-20 PROCEDURE — 80053 COMPREHEN METABOLIC PANEL: CPT

## 2022-05-20 PROCEDURE — 36415 COLL VENOUS BLD VENIPUNCTURE: CPT

## 2022-05-24 ENCOUNTER — TELEPHONE (OUTPATIENT)
Dept: INTERNAL MEDICINE CLINIC | Facility: CLINIC | Age: 87
End: 2022-05-24

## 2022-05-24 NOTE — TELEPHONE ENCOUNTER
FYI: spoke to patients   He stated that radiology contacted him and notified of the shortage scheduling the test for July  They then called the cardiology office and the cardiologist was ok with this

## 2022-05-24 NOTE — TELEPHONE ENCOUNTER
Call patient's   [facility] radiology department called me to ask if your wife's CT scan could be postponed because of the [place] shortage of contrast   We did not order the test so I don't know what to say  Mimi Anne looks like a cardiologist did   I am not sure where this order originated  Linda Bowen is this cardiologist?

## 2022-06-11 ENCOUNTER — HOSPITAL ENCOUNTER (OUTPATIENT)
Dept: MRI IMAGING | Facility: HOSPITAL | Age: 87
Discharge: HOME | End: 2022-06-11
Attending: PSYCHIATRY & NEUROLOGY
Payer: COMMERCIAL

## 2022-06-11 DIAGNOSIS — R41.3 MEMORY DIFFICULTY: ICD-10-CM

## 2022-06-11 PROCEDURE — 70551 MRI BRAIN STEM W/O DYE: CPT

## 2022-06-11 PROCEDURE — G1004 CDSM NDSC: HCPCS

## 2022-06-15 ENCOUNTER — TELEPHONE (OUTPATIENT)
Dept: NEUROLOGY | Facility: CLINIC | Age: 87
End: 2022-06-15

## 2022-06-16 DIAGNOSIS — R41.3 MEMORY DIFFICULTY: ICD-10-CM

## 2022-06-16 RX ORDER — RIVASTIGMINE 9.5 MG/24H
1 PATCH, EXTENDED RELEASE TRANSDERMAL DAILY
Qty: 30 PATCH | Refills: 5 | Status: SHIPPED | OUTPATIENT
Start: 2022-06-16 | End: 2022-06-21 | Stop reason: SDUPTHER

## 2022-06-16 NOTE — PROGRESS NOTES
Discussed with patient MRI scan of the brain results, she is not keen to see a neurosurgeon, she is currently on Exelon patch 4 6 mg per 24 hour advised her to increase it to 9 5 mg she wanted me to send the prescription to [address] [place] [address] which was sent

## 2022-06-21 DIAGNOSIS — R41.3 MEMORY DIFFICULTY: ICD-10-CM

## 2022-06-21 RX ORDER — RIVASTIGMINE 9.5 MG/24H
1 PATCH, EXTENDED RELEASE TRANSDERMAL DAILY
Qty: 90 PATCH | Refills: 3 | Status: SHIPPED | OUTPATIENT
Start: 2022-06-21

## 2022-06-21 NOTE — TELEPHONE ENCOUNTER
[facility] requesting 90 day supply of rivastigmine patches 9 5 mg  Dr Fátima Corado - Rx entered  Please review and sign if in agreement

## 2022-06-24 DIAGNOSIS — R41.3 MEMORY DIFFICULTY: ICD-10-CM

## 2022-06-24 NOTE — TELEPHONE ENCOUNTER
Pt LVM on nursing line  Pt states that she was told to double her rivastigmine but she is not sure she can handle it because all she wants to do is lay down  Requesting cb [phone]    Spoke with pt and she states that she was started on rivastigmine patches 4 6 mg about one month ago  After about 1 week into taking, pt started to feel very very tired  All she wanted to do was lay down all afternoon and evening  Pt states she had no energy  Pt though maybe body would get used to it but symptoms remain  Pt says she ran out of patches 2 days ago  Afraid to start 9 6 mg so she hasn't picked up higher dose from pharmacy  Pt has been off rivastigmine and states so far no difference yet, still in her system  Pt taking for memory difficulty  Looking into alternatives  If there is something else she can take  Dr Jere Delgado - Please advise  Best cb [phone], ok to leave detailed message

## 2022-06-27 NOTE — TELEPHONE ENCOUNTER
Please let them know that if she is able to tolerate the Exelon 4 6 mg patch then we will continue with that and if not then we could change her to Aricept

## 2022-06-28 RX ORDER — RIVASTIGMINE 4.6 MG/24H
PATCH, EXTENDED RELEASE TRANSDERMAL
Qty: 90 PATCH | Refills: 3 | Status: SHIPPED | OUTPATIENT
Start: 2022-06-28

## 2022-06-28 NOTE — TELEPHONE ENCOUNTER
Pt called to check status of exelon 4 6 patch script  Requesting be sent to [facility] mail order  Rx entered   Pls review and sign off    thanks

## 2022-07-05 ENCOUNTER — APPOINTMENT (OUTPATIENT)
Dept: LAB | Facility: HOSPITAL | Age: 87
End: 2022-07-05
Payer: COMMERCIAL

## 2022-07-05 DIAGNOSIS — E78.5 HYPERLIPIDEMIA, UNSPECIFIED HYPERLIPIDEMIA TYPE: ICD-10-CM

## 2022-07-05 LAB
ANION GAP SERPL CALCULATED.3IONS-SCNC: 8 MMOL/L (ref 4–13)
BUN SERPL-MCNC: 24 MG/DL (ref 5–25)
CALCIUM SERPL-MCNC: 9.1 MG/DL (ref 8.3–10.1)
CHLORIDE SERPL-SCNC: 93 MMOL/L (ref 100–108)
CO2 SERPL-SCNC: 31 MMOL/L (ref 21–32)
CREAT SERPL-MCNC: 0.85 MG/DL (ref 0.6–1.3)
GFR SERPL CREATININE-BSD FRML MDRD: 61 ML/MIN/1.73SQ M
GLUCOSE SERPL-MCNC: 94 MG/DL (ref 65–140)
POTASSIUM SERPL-SCNC: 4.1 MMOL/L (ref 3.5–5.3)
SODIUM SERPL-SCNC: 132 MMOL/L (ref 136–145)

## 2022-07-05 PROCEDURE — 36415 COLL VENOUS BLD VENIPUNCTURE: CPT

## 2022-07-05 PROCEDURE — 80048 BASIC METABOLIC PNL TOTAL CA: CPT

## 2022-07-13 ENCOUNTER — HOSPITAL ENCOUNTER (OUTPATIENT)
Dept: CT IMAGING | Facility: HOSPITAL | Age: 87
Discharge: HOME | End: 2022-07-13

## 2022-07-13 DIAGNOSIS — R55 SYNCOPE, UNSPECIFIED SYNCOPE TYPE: ICD-10-CM

## 2022-07-13 NOTE — NURSING NOTE
Cardiac CTA Questionairre      Cardiac history     Reason for exam: Hyperlipidemia     Have you been diagnosed with heart disease? NO     Do you have a family history of heart disease? NO     Have you ever experienced chest pain? NO     Have you had a CABG, angioplasty, cardiac cath, stent placement? NO     Do you have a pacemaker or defibrillator? NO     Have you ever been diagnosed with an irregular heart beat? NO     Do you have a history of having COPD or asthma? NO     Is your asthma controlled? Do you have high blood pressure? YES     Do you have diabetes? NO     Do you have high cholesterol? YES     Do you smoke? NO    Did you ever smoke? YES, Quit 65 years ago     General Information     Do you take any male enhancement medications such as Viagra, Levitra, or Cialis (PDE5 inhibitors) ? NO       Due to nitrate given during test, this needs to be held for 3 days prior to testing and may be            resumed 24 hrs  Do you have allergies? YES Latex, PCN  Allergy to intravenous contrast or dye? NO, never had per  Duy Sleight you have kidney disease? NO                              Blood work done:   7/5/22             BUN: 24     CREATINE:  0 85       GFR: 61     Height:  5'1"                        Weight: 106     Call placed to patient to discuss upcoming appointment at [place] [address] radiology department and complete consultation and Questionnaire with patient's  Huang Ruiz via phone, patient is diagnosis with memory loss  Patient is having a Cardiac CTA  Reviewed patient's allergies, also reviewed medications  No Beta blocker ordered by Doctor  Test instructions given, patient aware to hold all caffeine products for 12 hours prior (0230)to the exam this includes coffee, decaf, tea, soda and chocolate  Also made aware to avoid solid food for 4 hours prior (1030) to exam and to increase fluids one day prior to exam unless contraindicated    Patient was instructed that they may take all medications as usual unless otherwise directed by physician  Discussed the pre procedure expectations, post procedure expectations, time entailed to perform the study and also the medications that may be used in exam (beta blocker if needed to  heart rate to under 65 beats per minute for optimal exam results and NTG given during scan for vasodilation)  Reminded patient's  of location and time expected for procedure, instructed to bring photo ID, insurance card and script  Informed the patient that the study will last approximately 30-45 minutes  Pt verbalizes understanding of education and instructions  My number was also supplied to patient to call if any further questions or concerns should arise pre or post procedure  Also sent email to verified e-mail [email or web address], see message below  Good Afternoon Mr  & Mrs Marylen Greenspan,      Per our conversation Mr Marylen Greenspan, on 22 @ 13:00  your wife is scheduled on 22  @ 2:30 pm  for a CTA Cardiac in the Radiology department of the [facility] is located at [address] [place] [address], building [address] 1st floor and present to Radiology 15 minutes prior to your/their appointment time  I   Please avoid all caffeine products for 12 hours prior to the exam this includes coffee, decaf, tea, soda, or chocolate  This will be in effect from at 22 2:30 am till her appointment time  She is also to stop solid food 4 hours prior to the exam at 10:30 am but she can continue to drink fluids prior to the exam, she can increase fluids starting the day prior  You can take all your regular prescribed medication as usual unless otherwise directed by your physician  During the study certain medication may be such as a beta blocker if needed to  heart rate to under 65 beats per minute for optimal exam results and sublingual tablet of nitroglycerin will be given during scan for vasodilation  If the heart rate is not able to achieve target (55-60 beats per minute) study may be cancelled after discussing with Radiologist  At a higher heart rate study may not be useful or viable  The study will last approximately 30-45 minutes  If you have any question or concerns please feel free to contact me at the number below,      Isabel Quinones RN  [facility] RN  [address] [place] [address]  [phone] (Office)  [phone] (Fax)  [email or web address]

## 2022-07-19 ENCOUNTER — HOSPITAL ENCOUNTER (OUTPATIENT)
Dept: RADIOLOGY | Facility: HOSPITAL | Age: 87
Discharge: HOME | End: 2022-07-19

## 2022-07-19 ENCOUNTER — TELEPHONE (OUTPATIENT)
Dept: INTERNAL MEDICINE CLINIC | Facility: CLINIC | Age: 87
End: 2022-07-19

## 2022-07-19 DIAGNOSIS — E78.2 MIXED HYPERLIPIDEMIA: Primary | ICD-10-CM

## 2022-07-19 RX ORDER — ATORVASTATIN CALCIUM 80 MG/1
80 TABLET, FILM COATED ORAL DAILY
Qty: 90 TABLET | Refills: 3 | Status: SHIPPED | OUTPATIENT
Start: 2022-07-19

## 2022-07-21 ENCOUNTER — TELEPHONE (OUTPATIENT)
Dept: NEUROLOGY | Facility: CLINIC | Age: 87
End: 2022-07-21

## 2022-07-21 NOTE — TELEPHONE ENCOUNTER
"Patient's  calling in. States patient just \"blacked out for a few seconds\" this morning. Last time this occurred was March of this year.    [facility] patient is currently laying down on the couch resting. She feels tired, weak, and felt unsteady after episode.     took patient's BP it is 124/65, doesn't feel like her heart is racing.    Pt is to have a Cardiac CTA but hasn't been done yet due to insurance issues.    Medications confirmed as Exelon 4.6 mg patch every 24 hrs.    Losartan 50 mg 1 tab BID    Hasn't started on Atorvastatin 80 mg ( awaiting mail delivery of medication)    I advised  to take patient to the ED or call 911.   said he will wait to hear back from Dr. Muñoz.     [phone]  Ronnie    "

## 2022-07-24 ENCOUNTER — APPOINTMENT (INPATIENT)
Dept: CT IMAGING | Facility: HOSPITAL | Age: 87
DRG: 536 | End: 2022-07-24
Payer: COMMERCIAL

## 2022-07-24 ENCOUNTER — APPOINTMENT (EMERGENCY)
Dept: RADIOLOGY | Facility: HOSPITAL | Age: 87
DRG: 536 | End: 2022-07-24
Payer: COMMERCIAL

## 2022-07-24 ENCOUNTER — HOSPITAL ENCOUNTER (INPATIENT)
Facility: HOSPITAL | Age: 87
LOS: 2 days | Discharge: ANOTHER HOSPITAL | DRG: 536 | End: 2022-07-26
Attending: EMERGENCY MEDICINE | Admitting: FAMILY MEDICINE
Payer: COMMERCIAL

## 2022-07-24 DIAGNOSIS — S72.002A CLOSED FRACTURE OF LEFT HIP, INITIAL ENCOUNTER (HCC): Primary | ICD-10-CM

## 2022-07-24 LAB
ALBUMIN SERPL BCP-MCNC: 3.5 G/DL (ref 3.5–5)
ALP SERPL-CCNC: 103 U/L (ref 46–116)
ALT SERPL W P-5'-P-CCNC: 23 U/L (ref 12–78)
ANION GAP SERPL CALCULATED.3IONS-SCNC: 9 MMOL/L (ref 4–13)
AST SERPL W P-5'-P-CCNC: 20 U/L (ref 5–45)
BASOPHILS # BLD AUTO: 0.04 THOUSANDS/ΜL (ref 0–0.1)
BASOPHILS NFR BLD AUTO: 1 % (ref 0–1)
BILIRUB SERPL-MCNC: 0.24 MG/DL (ref 0.2–1)
BUN SERPL-MCNC: 23 MG/DL (ref 5–25)
CALCIUM SERPL-MCNC: 8.9 MG/DL (ref 8.3–10.1)
CHLORIDE SERPL-SCNC: 91 MMOL/L (ref 96–108)
CO2 SERPL-SCNC: 28 MMOL/L (ref 21–32)
CREAT SERPL-MCNC: 0.88 MG/DL (ref 0.6–1.3)
EOSINOPHIL # BLD AUTO: 0.04 THOUSAND/ΜL (ref 0–0.61)
EOSINOPHIL NFR BLD AUTO: 1 % (ref 0–6)
ERYTHROCYTE [DISTWIDTH] IN BLOOD BY AUTOMATED COUNT: 13.1 % (ref 11.6–15.1)
GFR SERPL CREATININE-BSD FRML MDRD: 58 ML/MIN/1.73SQ M
GLUCOSE SERPL-MCNC: 125 MG/DL (ref 65–140)
HCT VFR BLD AUTO: 32.9 % (ref 34.8–46.1)
HGB BLD-MCNC: 11.1 G/DL (ref 11.5–15.4)
IMM GRANULOCYTES # BLD AUTO: 0.02 THOUSAND/UL (ref 0–0.2)
IMM GRANULOCYTES NFR BLD AUTO: 0 % (ref 0–2)
LYMPHOCYTES # BLD AUTO: 1.24 THOUSANDS/ΜL (ref 0.6–4.47)
LYMPHOCYTES NFR BLD AUTO: 17 % (ref 14–44)
MCH RBC QN AUTO: 29.4 PG (ref 26.8–34.3)
MCHC RBC AUTO-ENTMCNC: 33.7 G/DL (ref 31.4–37.4)
MCV RBC AUTO: 87 FL (ref 82–98)
MONOCYTES # BLD AUTO: 0.74 THOUSAND/ΜL (ref 0.17–1.22)
MONOCYTES NFR BLD AUTO: 10 % (ref 4–12)
NEUTROPHILS # BLD AUTO: 5.06 THOUSANDS/ΜL (ref 1.85–7.62)
NEUTS SEG NFR BLD AUTO: 71 % (ref 43–75)
NRBC BLD AUTO-RTO: 0 /100 WBCS
PLATELET # BLD AUTO: 228 THOUSANDS/UL (ref 149–390)
PLATELET # BLD AUTO: 235 THOUSANDS/UL (ref 149–390)
PMV BLD AUTO: 10.6 FL (ref 8.9–12.7)
PMV BLD AUTO: 11.3 FL (ref 8.9–12.7)
POTASSIUM SERPL-SCNC: 3.8 MMOL/L (ref 3.5–5.3)
PROT SERPL-MCNC: 7.1 G/DL (ref 6.4–8.4)
RBC # BLD AUTO: 3.77 MILLION/UL (ref 3.81–5.12)
SODIUM SERPL-SCNC: 128 MMOL/L (ref 135–147)
WBC # BLD AUTO: 7.14 THOUSAND/UL (ref 4.31–10.16)

## 2022-07-24 PROCEDURE — 85025 COMPLETE CBC W/AUTO DIFF WBC: CPT | Performed by: PHYSICIAN ASSISTANT

## 2022-07-24 PROCEDURE — 71045 X-RAY EXAM CHEST 1 VIEW: CPT

## 2022-07-24 PROCEDURE — 99223 1ST HOSP IP/OBS HIGH 75: CPT | Performed by: FAMILY MEDICINE

## 2022-07-24 PROCEDURE — 99285 EMERGENCY DEPT VISIT HI MDM: CPT

## 2022-07-24 PROCEDURE — 99285 EMERGENCY DEPT VISIT HI MDM: CPT | Performed by: PHYSICIAN ASSISTANT

## 2022-07-24 PROCEDURE — G1004 CDSM NDSC: HCPCS

## 2022-07-24 PROCEDURE — 85049 AUTOMATED PLATELET COUNT: CPT | Performed by: FAMILY MEDICINE

## 2022-07-24 PROCEDURE — 72192 CT PELVIS W/O DYE: CPT

## 2022-07-24 PROCEDURE — 36415 COLL VENOUS BLD VENIPUNCTURE: CPT | Performed by: PHYSICIAN ASSISTANT

## 2022-07-24 PROCEDURE — 73552 X-RAY EXAM OF FEMUR 2/>: CPT

## 2022-07-24 PROCEDURE — 73502 X-RAY EXAM HIP UNI 2-3 VIEWS: CPT

## 2022-07-24 PROCEDURE — 93005 ELECTROCARDIOGRAM TRACING: CPT

## 2022-07-24 PROCEDURE — 80053 COMPREHEN METABOLIC PANEL: CPT | Performed by: PHYSICIAN ASSISTANT

## 2022-07-24 RX ORDER — ACETAMINOPHEN 325 MG/1
650 TABLET ORAL EVERY 6 HOURS PRN
Status: DISCONTINUED | OUTPATIENT
Start: 2022-07-24 | End: 2022-07-26 | Stop reason: HOSPADM

## 2022-07-24 RX ORDER — ATORVASTATIN CALCIUM 40 MG/1
80 TABLET, FILM COATED ORAL DAILY
Status: DISCONTINUED | OUTPATIENT
Start: 2022-07-25 | End: 2022-07-26 | Stop reason: HOSPADM

## 2022-07-24 RX ORDER — LOSARTAN POTASSIUM 50 MG/1
50 TABLET ORAL 2 TIMES DAILY
Status: DISCONTINUED | OUTPATIENT
Start: 2022-07-24 | End: 2022-07-26 | Stop reason: HOSPADM

## 2022-07-24 RX ORDER — OXYCODONE HYDROCHLORIDE 5 MG/1
5 TABLET ORAL EVERY 6 HOURS PRN
Status: DISCONTINUED | OUTPATIENT
Start: 2022-07-24 | End: 2022-07-26 | Stop reason: HOSPADM

## 2022-07-24 RX ORDER — RIVASTIGMINE 4.6 MG/24H
1 PATCH, EXTENDED RELEASE TRANSDERMAL DAILY
Status: DISCONTINUED | OUTPATIENT
Start: 2022-07-25 | End: 2022-07-26 | Stop reason: HOSPADM

## 2022-07-24 RX ORDER — ASPIRIN 81 MG/1
81 TABLET ORAL DAILY
Status: DISCONTINUED | OUTPATIENT
Start: 2022-07-25 | End: 2022-07-26 | Stop reason: HOSPADM

## 2022-07-24 RX ORDER — LANOLIN ALCOHOL/MO/W.PET/CERES
6 CREAM (GRAM) TOPICAL
Status: DISCONTINUED | OUTPATIENT
Start: 2022-07-24 | End: 2022-07-26 | Stop reason: HOSPADM

## 2022-07-24 RX ORDER — ENOXAPARIN SODIUM 100 MG/ML
40 INJECTION SUBCUTANEOUS DAILY
Status: DISCONTINUED | OUTPATIENT
Start: 2022-07-25 | End: 2022-07-26 | Stop reason: HOSPADM

## 2022-07-24 RX ORDER — ONDANSETRON 2 MG/ML
4 INJECTION INTRAMUSCULAR; INTRAVENOUS EVERY 6 HOURS PRN
Status: DISCONTINUED | OUTPATIENT
Start: 2022-07-24 | End: 2022-07-26 | Stop reason: HOSPADM

## 2022-07-24 RX ORDER — RIVASTIGMINE 9.5 MG/24H
1 PATCH, EXTENDED RELEASE TRANSDERMAL DAILY
Status: DISCONTINUED | OUTPATIENT
Start: 2022-07-25 | End: 2022-07-26 | Stop reason: HOSPADM

## 2022-07-24 RX ADMIN — OXYCODONE HYDROCHLORIDE 5 MG: 5 TABLET ORAL at 22:20

## 2022-07-24 RX ADMIN — LOSARTAN POTASSIUM 50 MG: 50 TABLET, FILM COATED ORAL at 22:20

## 2022-07-24 RX ADMIN — Medication 6 MG: at 22:20

## 2022-07-24 NOTE — ED PROVIDER NOTES
History  Chief Complaint   Patient presents with    Hip Pain     Left Hip Pain, reports 8/10      81yo female with a history of hypertension, hyperlipidemia, and previous TIA presenting via EMS for evaluation after a fall less than an hour ago  Patient was sitting on a bar stool and attempted to stand up  The bar stool started to topple and she fell directly on her left hip  There was no head strike or LOC  Patient was unable to get up after the fall and EMS was called  She has been unable to bear weight since the fall  No paresthesias  She denies other injuries  History provided by:  Patient   used: No    Fall  Mechanism of injury: fall    Injury location:  Leg  Leg injury location:  L hip  Time since incident:  1 hour  Arrived directly from scene: yes    Fall:     Fall occurred: Sitting  Height of fall:  Ground level    Impact surface:  Hard floor    Point of impact: L hip  Prior to arrival data:     Patient ambulatory at scene: no      Loss of consciousness: no      Amnesic to event: no    Associated symptoms: no abdominal pain, no back pain, no chest pain, no difficulty breathing, no headaches, no loss of consciousness, no nausea, no neck pain, no seizures and no vomiting    Risk factors: no anticoagulation therapy        Prior to Admission Medications   Prescriptions Last Dose Informant Patient Reported?  Taking?   aspirin (ECOTRIN LOW STRENGTH) 81 mg EC tablet  Self No No   Sig: Take 1 tablet (81 mg total) by mouth daily   atorvastatin (LIPITOR) 80 mg tablet   No No   Sig: Take 1 tablet (80 mg total) by mouth daily   losartan (COZAAR) 50 mg tablet   No No   Sig: Take 1 tablet (50 mg total) by mouth 2 (two) times a day   rivastigmine (EXELON) 4 6 mg/24 hr TD 24 hr patch   No No   Sig: Place 1 patch on the skin in the morning   rivastigmine (EXELON) 9 5 mg/24 hr TD 24 hr patch   No No   Sig: Place 1 patch on the skin daily      Facility-Administered Medications: None       Past Medical History:   Diagnosis Date    TIA (transient ischemic attack)        Past Surgical History:   Procedure Laterality Date    FOOT SURGERY         Family History   Problem Relation Age of Onset   Suly Natarajan Parkinsonism Mother     Diabetes Father     Hyperlipidemia Father     Hypertension Father     Other Father         cardiac Disorder    No Known Problems Maternal Grandmother     No Known Problems Maternal Grandfather     No Known Problems Paternal Grandmother     No Known Problems Paternal Grandfather     No Known Problems Sister     Cancer Maternal Aunt     Breast cancer Maternal Aunt         age unknown    No Known Problems Daughter     No Known Problems Son      I have reviewed and agree with the history as documented  E-Cigarette/Vaping    E-Cigarette Use Never User      E-Cigarette/Vaping Substances    Nicotine No     THC No     CBD No     Flavoring No     Other No     Unknown No      Social History     Tobacco Use    Smoking status: Former Smoker     Types: Cigarettes     Start date:      Quit date:      Years since quittin 6    Smokeless tobacco: Never Used    Tobacco comment: Quit smoking at 21years of age   [de-identified] Use    Vaping Use: Never used   Substance Use Topics    Alcohol use: Yes     Comment: occasional    Drug use: No       Review of Systems   Constitutional: Negative for chills and fever  HENT: Negative for drooling and voice change  Eyes: Negative for discharge and redness  Respiratory: Negative for shortness of breath and stridor  Cardiovascular: Negative for chest pain and leg swelling  Gastrointestinal: Negative for abdominal pain, nausea and vomiting  Musculoskeletal: Positive for arthralgias and gait problem  Negative for back pain, neck pain and neck stiffness  Skin: Negative for color change and rash  Neurological: Negative for seizures, loss of consciousness, syncope and headaches     Psychiatric/Behavioral: Negative for confusion  The patient is not nervous/anxious  All other systems reviewed and are negative  Physical Exam  Physical Exam  Vitals and nursing note reviewed  Constitutional:       General: She is not in acute distress  Appearance: She is well-developed  She is not diaphoretic  HENT:      Head: Normocephalic and atraumatic  Comments: No external signs of head trauma     Right Ear: External ear normal       Left Ear: External ear normal       Nose: Nose normal    Eyes:      General: No scleral icterus  Right eye: No discharge  Left eye: No discharge  Conjunctiva/sclera: Conjunctivae normal    Neck:      Comments: No cervical spine tenderness  Cardiovascular:      Rate and Rhythm: Normal rate and regular rhythm  Heart sounds: Normal heart sounds  No murmur heard  Pulmonary:      Effort: Pulmonary effort is normal  No respiratory distress  Breath sounds: Normal breath sounds  No stridor  No wheezing or rales  Abdominal:      General: Bowel sounds are normal  There is no distension  Palpations: Abdomen is soft  Tenderness: There is no abdominal tenderness  There is no guarding  Musculoskeletal:         General: No deformity  Cervical back: Normal range of motion and neck supple  No tenderness or bony tenderness  Thoracic back: No tenderness or bony tenderness  Lumbar back: No tenderness or bony tenderness  Left hip: No deformity or lacerations  Decreased range of motion  Comments: Left hip: No tenderness to palpation  She refuses to move joint 2/2 pain  No obvious deformity  No lacerations  LLE is neurovascularly intact  Lymphadenopathy:      Cervical: No cervical adenopathy  Skin:     General: Skin is warm and dry  Neurological:      Mental Status: She is alert  She is not disoriented  GCS: GCS eye subscore is 4  GCS verbal subscore is 5  GCS motor subscore is 6     Psychiatric:         Behavior: Behavior normal  Vital Signs  ED Triage Vitals   Temperature Pulse Respirations Blood Pressure SpO2   07/24/22 1247 07/24/22 1245 07/24/22 1247 07/24/22 1245 07/24/22 1245   97 9 °F (36 6 °C) 79 18 146/76 99 %      Temp Source Heart Rate Source Patient Position - Orthostatic VS BP Location FiO2 (%)   07/24/22 1247 07/24/22 1247 07/24/22 1247 07/24/22 1247 --   Oral Monitor Lying Right arm       Pain Score       07/24/22 1247       8           Vitals:    07/24/22 1245 07/24/22 1247 07/24/22 1345 07/24/22 1400   BP: 146/76 146/76 157/70 160/71   Pulse: 79 77 75 72   Patient Position - Orthostatic VS:  Lying           Visual Acuity      ED Medications  Medications - No data to display    Diagnostic Studies  Results Reviewed     Procedure Component Value Units Date/Time    CBC and differential [[id number]]  (Abnormal) Collected: 07/24/22 1413    Lab Status: Final result Specimen: Blood from Arm, Left Updated: 07/24/22 1419     WBC 7 14 Thousand/uL      RBC 3 77 Million/uL      Hemoglobin 11 1 g/dL      Hematocrit 32 9 %      MCV 87 fL      MCH 29 4 pg      MCHC 33 7 g/dL      RDW 13 1 %      MPV 10 6 fL      Platelets 416 Thousands/uL      nRBC 0 /100 WBCs      Neutrophils Relative 71 %      Immat GRANS % 0 %      Lymphocytes Relative 17 %      Monocytes Relative 10 %      Eosinophils Relative 1 %      Basophils Relative 1 %      Neutrophils Absolute 5 06 Thousands/µL      Immature Grans Absolute 0 02 Thousand/uL      Lymphocytes Absolute 1 24 Thousands/µL      Monocytes Absolute 0 74 Thousand/µL      Eosinophils Absolute 0 04 Thousand/µL      Basophils Absolute 0 04 Thousands/µL     Comprehensive metabolic panel [[id number]] Collected: 07/24/22 1413    Lab Status:  In process Specimen: Blood from Arm, Left Updated: 07/24/22 1416                 XR hip/pelv 2-3 vws left   ED Interpretation by Deny Keller PA-C (07/24 1413)   Fracture      XR femur 2 vw left    (Results Pending)   XR chest portable    (Results Pending) Procedures  Procedures         ED Course                     MDM  Number of Diagnoses or Management Options  Closed fracture of left hip, initial encounter [facility]): new and requires workup  Diagnosis management comments: 79yo female presenting after a mechanical fall less than an hour ago  Ayla Alatorre off barstool onto left hip  C/o left hip pain  No other injuries  She refuses to move left hip 2/2 pain  No obvious deformity  LLE is neurovascularly intact  No other injuries seen on secondary survey  Initial ED plan: Check left hip x-rays  She declines analgesics  Final assessment: Hip x-rays confirm a femoral neck fracture  Femur x-rays added which show no other fractures  Case discussed with orthopedics and she was admitted for further management  Amount and/or Complexity of Data Reviewed  Clinical lab tests: ordered and reviewed  Tests in the radiology section of CPT®: ordered and reviewed  Review and summarize past medical records: yes  Discuss the patient with other providers: yes  Independent visualization of images, tracings, or specimens: yes    Risk of Complications, Morbidity, and/or Mortality  Presenting problems: moderate  Diagnostic procedures: moderate  Management options: moderate    Patient Progress  Patient progress: stable      Disposition  Final diagnoses:   Closed fracture of left hip, initial encounter [facility])     Time reflects when diagnosis was documented in both MDM as applicable and the Disposition within this note     Time User Action Codes Description Comment    7/24/2022  2:32  [facility] [address] [place] [address] [[id number]] Closed fracture of left hip, initial encounter [facility])       ED Disposition     ED Disposition   Admit    Condition   Stable    Date/Time   Sun Jul 24, 2022  2:32 PM    Comment   Case was discussed with Dr Peter Wesley and the patient's admission status was agreed to be Admission Status: inpatient status to the service of Dr Peter Wesley              Follow-up Information    None Patient's Medications   Discharge Prescriptions    No medications on file       No discharge procedures on file      PDMP Review     None          ED Provider  Electronically Signed by           Emily Pierce, CAITY  07/25/22 1000

## 2022-07-24 NOTE — ED TRIAGE NOTES
Patient BIB EMS due to a fall at her daughter's home  Pt reports "I was dismounting from a high stool when I slipped on my dress shoes and fell on my side"  Pt c/o 8/10 pain to L hip  No obvious deformities noted  +Pedal pulses  Denies numbness or tingling   Guarding to L leg     -Head strike, -LOC, -Blood thinners

## 2022-07-24 NOTE — H&P
[facility]  H&P- Andrae Blevins 1934, 80 y o  female MRN: [id number]  Unit/Bed#: ED 06 Encounter: [id number]  Primary Care Provider: Clare Spain MD   Date and time admitted to hospital: 7/24/2022 12:41 PM    * Closed left hip fracture [facility])  Assessment & Plan  Per ortho  NPO after midnight  Check an EKG but at this point given lack of cardiac risk factors I would put the patient on low to moderate risk given her age  She is a Yarsanism  Pain control  Nonweightbearing  Patient is hyponatremic  Get a BMP in the morning    Memory loss  Assessment & Plan  Exelon patch    Essential hypertension  Assessment & Plan  Continue Cozaar    Hyperlipidemia  Assessment & Plan  Continue statin      VTE Prophylaxis: Enoxaparin (Lovenox)  / sequential compression device   Code Status:  Full code  POLST: POLST is not applicable to this patient  Discussion with family:   at the bedside    Anticipated Length of Stay:  Patient will be admitted on an Inpatient basis with an anticipated length of stay of  greater than 2 midnights  Justification for Hospital Stay:  Patient is going to require greater than 2 midnights secondary to being elderly female patient with fall with hip fracture in need of operative management    Total Time for Visit, including Counseling / Coordination of Care: 60 minutes  Greater than 50% of this total time spent on direct patient counseling and coordination of care  Chief Complaint:   Left hip pain    History of Present Illness:    Andrae Blevins is a 80 y o  female who presents with left hip pain  This is a very pleasant 80-year-old female patient who came with left hip pain  The patient was getting up out of the chair and she was wearing some supper issues and she fell to the ground  She landed on her left hip  No head trauma or loss of consciousness  Currently the patient's pain is under control when she is lying flat and not moving    She denies any chest pain or shortness of breath  Denies any history of coronary disease  The patient has had a TIA years ago  Otherwise she is relatively healthy with underlying history of hypertension and hyperlipidemia which are both under good control  Review of Systems:    Review of Systems   Musculoskeletal: Positive for gait problem  Left hip pain   All other systems reviewed and are negative  Past Medical and Surgical History:     Past Medical History:   Diagnosis Date    Cognitive decline     Hyperlipidemia     Hypertension     TIA (transient ischemic attack)        Past Surgical History:   Procedure Laterality Date    FOOT SURGERY  2007       Meds/Allergies:    Prior to Admission medications    Medication Sig Start Date End Date Taking? Authorizing Provider   aspirin (ECOTRIN LOW STRENGTH) 81 mg EC tablet Take 1 tablet (81 mg total) by mouth daily 9/11/20   Riri Rojas PA-C   atorvastatin (LIPITOR) 80 mg tablet Take 1 tablet (80 mg total) by mouth daily 7/19/22   Lloyd Akbar MD   losartan (COZAAR) 50 mg tablet Take 1 tablet (50 mg total) by mouth 2 (two) times a day 5/6/22   Riri Rojas PA-C   rivastigmine (EXELON) 4 6 mg/24 hr TD 24 hr patch Place 1 patch on the skin in the morning 6/28/22   Cristina Frank MD   rivastigmine (EXELON) 9 5 mg/24 hr TD 24 hr patch Place 1 patch on the skin daily 6/21/22   Cristina Frank MD     I have reviewed home medications with a medical source (PCP, Pharmacy, other)  Allergies:    Allergies   Allergen Reactions    Latex     Molds & Smuts     Penicillins        Social History:     Marital Status: /Civil Union   Occupation:  Retired  Patient Pre-hospital Living Situation:  Lives with her   Patient Pre-hospital Level of Mobility:  Independent  Patient Pre-hospital Diet Restrictions:  None  Substance Use History:   Social History     Substance and Sexual Activity   Alcohol Use Yes    Comment: occasional     Social History Tobacco Use   Smoking Status Former Smoker    Types: Cigarettes    Start date:     Quit date: 36    Years since quittin 6   Smokeless Tobacco Never Used   Tobacco Comment    Quit smoking at 21years of age     Social History     Substance and Sexual Activity   Drug Use No       Family History:    Family History   Problem Relation Age of Onset    Parkinsonism Mother     Diabetes Father     Hyperlipidemia Father     Hypertension Father     Other Father         cardiac Disorder    No Known Problems Maternal Grandmother     No Known Problems Maternal Grandfather     No Known Problems Paternal Grandmother     No Known Problems Paternal Grandfather     No Known Problems Sister     Cancer Maternal Aunt     Breast cancer Maternal Aunt         age unknown    No Known Problems Daughter     No Known Problems Son        Physical Exam:     Vitals:   Blood Pressure: 160/71 (22 1400)  Pulse: 72 (22 1400)  Temperature: 97 9 °F (36 6 °C) (22)  Temp Source: Oral (22)  Respirations: (!) 11 (22 1400)  Height: 5' 2" (157 5 cm) (22)  Weight - Scale: 48 4 kg (106 lb 11 2 oz) (22)  SpO2: 100 % (22 1400)    Physical Exam    (   General Appearance:    Alert, cooperative, no distress, appears stated age   Head:    Normocephalic, without obvious abnormality, atraumatic   Eyes:    PERRL, conjunctiva/corneas clear, EOM's intact,             Nose:   Nares normal, septum midline, mucosa normal   Throat:   Lips, mucosa, and tongue normal; teeth and gums normal   Neck:   Supple, symmetrical, no adenopathy;        thyroid:  No enlargement/tenderness/nodules; no carotid    bruit or JVD   Back:     Symmetric, no curvature, ROM normal, no CVA tenderness   Lungs:     Clear to auscultation bilaterally, respirations unlabored       Heart:    Regular rate and rhythm, S1 and S2 normal, no murmur, rub    or gallop   Abdomen:     Soft, non-tender, bowel sounds active all four quadrants,     no masses, no organomegaly           Extremities:   Extremities normal, atraumatic, no cyanosis or edema   Pulses:   2+ and symmetric all extremities   Skin:   Skin color, texture, turgor normal, no rashes or lesions   Lymph nodes:   Cervical, supraclavicular, and axillary nodes normal   Neurologic:   CNII-XII intact  Normal strength, sensation and reflexes       throughout       Additional Data:     Lab Results: I have personally reviewed pertinent reports  Results from last 7 days   Lab Units 07/24/22  1413   WBC Thousand/uL 7 14   HEMOGLOBIN g/dL 11 1*   HEMATOCRIT % 32 9*   PLATELETS Thousands/uL 228   NEUTROS PCT % 71   LYMPHS PCT % 17   MONOS PCT % 10   EOS PCT % 1     Results from last 7 days   Lab Units 07/24/22  1413   SODIUM mmol/L 128*   POTASSIUM mmol/L 3 8   CHLORIDE mmol/L 91*   CO2 mmol/L 28   BUN mg/dL 23   CREATININE mg/dL 0 88   ANION GAP mmol/L 9   CALCIUM mg/dL 8 9   ALBUMIN g/dL 3 5   TOTAL BILIRUBIN mg/dL 0 24   ALK PHOS U/L 103   ALT U/L 23   AST U/L 20   GLUCOSE RANDOM mg/dL 125                       Imaging: I have personally reviewed pertinent reports  XR hip/pelv 2-3 vws left   ED Interpretation by Treva Arteaga PA-C (07/24 1413)   Fracture      XR femur 2 vw left    (Results Pending)   XR chest portable    (Results Pending)       ·     Allscripts / Epic Records Reviewed: Yes     ** Please Note: This note has been constructed using a voice recognition system   **

## 2022-07-24 NOTE — ASSESSMENT & PLAN NOTE
Per ortho  NPO after midnight  Check an EKG but at this point given lack of cardiac risk factors I would put the patient on low to moderate risk given her age  She is a Taoist  Pain control    Nonweightbearing

## 2022-07-25 PROBLEM — E87.1 HYPONATREMIA: Status: ACTIVE | Noted: 2022-07-25

## 2022-07-25 LAB
ABO GROUP BLD: NORMAL
ANION GAP SERPL CALCULATED.3IONS-SCNC: 8 MMOL/L (ref 4–13)
ANION GAP SERPL CALCULATED.3IONS-SCNC: 8 MMOL/L (ref 4–13)
BLD GP AB SCN SERPL QL: NEGATIVE
BUN SERPL-MCNC: 16 MG/DL (ref 5–25)
BUN SERPL-MCNC: 20 MG/DL (ref 5–25)
CALCIUM SERPL-MCNC: 8.8 MG/DL (ref 8.3–10.1)
CALCIUM SERPL-MCNC: 9 MG/DL (ref 8.3–10.1)
CHLORIDE SERPL-SCNC: 90 MMOL/L (ref 96–108)
CHLORIDE SERPL-SCNC: 92 MMOL/L (ref 96–108)
CO2 SERPL-SCNC: 28 MMOL/L (ref 21–32)
CO2 SERPL-SCNC: 28 MMOL/L (ref 21–32)
CREAT SERPL-MCNC: 0.76 MG/DL (ref 0.6–1.3)
CREAT SERPL-MCNC: 0.89 MG/DL (ref 0.6–1.3)
ERYTHROCYTE [DISTWIDTH] IN BLOOD BY AUTOMATED COUNT: 13.1 % (ref 11.6–15.1)
GFR SERPL CREATININE-BSD FRML MDRD: 58 ML/MIN/1.73SQ M
GFR SERPL CREATININE-BSD FRML MDRD: 70 ML/MIN/1.73SQ M
GLUCOSE SERPL-MCNC: 104 MG/DL (ref 65–140)
GLUCOSE SERPL-MCNC: 109 MG/DL (ref 65–140)
HCT VFR BLD AUTO: 32.9 % (ref 34.8–46.1)
HGB BLD-MCNC: 11 G/DL (ref 11.5–15.4)
MCH RBC QN AUTO: 29.3 PG (ref 26.8–34.3)
MCHC RBC AUTO-ENTMCNC: 33.4 G/DL (ref 31.4–37.4)
MCV RBC AUTO: 88 FL (ref 82–98)
PLATELET # BLD AUTO: 227 THOUSANDS/UL (ref 149–390)
PMV BLD AUTO: 10.8 FL (ref 8.9–12.7)
POTASSIUM SERPL-SCNC: 3.5 MMOL/L (ref 3.5–5.3)
POTASSIUM SERPL-SCNC: 3.7 MMOL/L (ref 3.5–5.3)
RBC # BLD AUTO: 3.76 MILLION/UL (ref 3.81–5.12)
RH BLD: POSITIVE
SODIUM SERPL-SCNC: 126 MMOL/L (ref 135–147)
SODIUM SERPL-SCNC: 128 MMOL/L (ref 135–147)
SPECIMEN EXPIRATION DATE: NORMAL
WBC # BLD AUTO: 9.4 THOUSAND/UL (ref 4.31–10.16)

## 2022-07-25 PROCEDURE — 86850 RBC ANTIBODY SCREEN: CPT | Performed by: PSYCHIATRY & NEUROLOGY

## 2022-07-25 PROCEDURE — 86901 BLOOD TYPING SEROLOGIC RH(D): CPT | Performed by: PSYCHIATRY & NEUROLOGY

## 2022-07-25 PROCEDURE — 80048 BASIC METABOLIC PNL TOTAL CA: CPT | Performed by: PHYSICIAN ASSISTANT

## 2022-07-25 PROCEDURE — 80048 BASIC METABOLIC PNL TOTAL CA: CPT | Performed by: PSYCHIATRY & NEUROLOGY

## 2022-07-25 PROCEDURE — 86900 BLOOD TYPING SEROLOGIC ABO: CPT | Performed by: PSYCHIATRY & NEUROLOGY

## 2022-07-25 PROCEDURE — 99223 1ST HOSP IP/OBS HIGH 75: CPT | Performed by: PHYSICIAN ASSISTANT

## 2022-07-25 PROCEDURE — NC001 PR NO CHARGE: Performed by: PHYSICIAN ASSISTANT

## 2022-07-25 PROCEDURE — 85027 COMPLETE CBC AUTOMATED: CPT | Performed by: PSYCHIATRY & NEUROLOGY

## 2022-07-25 PROCEDURE — 99239 HOSP IP/OBS DSCHRG MGMT >30: CPT | Performed by: PHYSICIAN ASSISTANT

## 2022-07-25 RX ORDER — SODIUM CHLORIDE 9 MG/ML
75 INJECTION, SOLUTION INTRAVENOUS CONTINUOUS
Status: DISCONTINUED | OUTPATIENT
Start: 2022-07-25 | End: 2022-07-26 | Stop reason: HOSPADM

## 2022-07-25 RX ADMIN — ASPIRIN 81 MG: 81 TABLET ORAL at 09:35

## 2022-07-25 RX ADMIN — ENOXAPARIN SODIUM 40 MG: 40 INJECTION SUBCUTANEOUS at 09:35

## 2022-07-25 RX ADMIN — LOSARTAN POTASSIUM 50 MG: 50 TABLET, FILM COATED ORAL at 09:38

## 2022-07-25 RX ADMIN — SODIUM CHLORIDE 75 ML/HR: 0.9 INJECTION, SOLUTION INTRAVENOUS at 09:35

## 2022-07-25 RX ADMIN — ACETAMINOPHEN 650 MG: 325 TABLET, FILM COATED ORAL at 09:38

## 2022-07-25 RX ADMIN — ATORVASTATIN CALCIUM 80 MG: 40 TABLET, FILM COATED ORAL at 09:21

## 2022-07-25 RX ADMIN — RIVASTIGMINE 1 PATCH: 9.5 PATCH, EXTENDED RELEASE TRANSDERMAL at 09:35

## 2022-07-25 NOTE — ASSESSMENT & PLAN NOTE
Patient presented after fall out of chair with left hip pain  X-ray shows left subcapital femoral neck fracture  Appreciate Ortho input  Currently NPO for hemiarthroplasty this afternoon  Continue pain control, nonweightbearing status  Will need PT/OT postoperatively  Of note, patient is Sikhism

## 2022-07-25 NOTE — DISCHARGE SUMMARY
[address]  Discharge- Chris Mc 1934, 80 y o  female MRN: [id number]  Unit/Bed#: -01 Encounter: [id number]  Primary Care Provider: Paulette Kruse MD   Date and time admitted to hospital: 7/24/2022 12:41 PM    * Closed left hip fracture [facility])  Assessment & Plan  Patient presented after fall out of chair with left hip pain  Imaging shows left subcapital femoral neck fracture  Appreciate Ortho input - will need hemiarthroplasty    Continue pain control, nonweightbearing status  Of note, patient is Sabianism   Family requesting transfer to bloodless surgery center at [facility]  Will need PT/OT postoperatively    Hyponatremia  Assessment & Plan  Sodium 128 after initiation of IVF this AM, will continue NSS   Resume PO diet until plan for surg intervention at outside hospital   Check urine and serum osmolality studies    Memory loss  Assessment & Plan  Continue Exelon patch    Essential hypertension  Assessment & Plan  Continue Cozaar    Hyperlipidemia  Assessment & Plan  Continue statin    Hypothyroidism, adult  Assessment & Plan  Recent TSH and T4 were normal in May 2022    Medical Problems             Resolved Problems  Date Reviewed: 7/25/2022   None               Discharging Physician / Practitioner: Félix Velasquez PA-C  PCP: Paulette Kruse MD  Admission Date:   Admission Orders (From admission, onward)     Ordered        07/24/22 1432  INPATIENT ADMISSION  Once                      Discharge Date: 07/25/22    Consultations During Hospital Stay:  · Ortho surgery     Procedures Performed:   · None     Significant Findings / Test Results:   · X-ray left hip/pelvis with acute fracture of left hip  · X-ray left femur shows acute minimally displaced left subcapital femoral neck fracture  · Chest x-ray no acute cardiopulmonary disease  · CT pelvis without contrast shows impacted subcapital left femoral neck fracture  · Hyponatremia    Incidental Findings:   · None    Test Results Pending at Discharge (will require follow up): · None     Outpatient Tests Requested:  · None    Complications:  Patient and family refusing surgical intervention and request transfer to a bloodless surgical center    Reason for Admission:  Fall and left hip pain    Hospital Course:   Subhash Galeas is a 80 y o  female patient past medical history significant for hypertension, hyperlipidemia and cognitive decline who originally presented to the hospital on 7/24/2022 due to fall out of a chair with sudden left hip pain  Patient was brought to the ER and found to have subcapital left femoral neck fracture  Sodium was 128 on admission  Patient was made NPO and Ortho surgery was consulted with plan for surgical intervention on 07/25  Patient's sodium level was 126, she was started on fluids and repeat lab work showed sodium 128 later in the afternoon  Patient's family decided they wish to pursue surgery at a bloodless surgical center due to patient's history of Christian  Arrangements are to be made  Please see above list of diagnoses and related plan for additional information  Condition at Discharge: stable    Discharge Day Visit / Exam:   * Please refer to separate progress note for these details *    Discussion with Family: Updated  () at bedside  Discharge instructions/Information to patient and family:   See after visit summary for information provided to patient and family  Provisions for Follow-Up Care:  See after visit summary for information related to follow-up care and any pertinent home health orders  Disposition:   [address] Transfer to [facility]    Planned Readmission: yes to outside hospital      Discharge Statement:  I spent 40 minutes discharging the patient  This time was spent on the day of discharge  I had direct contact with the patient on the day of discharge   Greater than 50% of the total time was spent examining patient, answering all patient questions, arranging and discussing plan of care with patient as well as directly providing post-discharge instructions  Additional time then spent on discharge activities  Discharge Medications:  See after visit summary for reconciled discharge medications provided to patient and/or family        **Please Note: This note may have been constructed using a voice recognition system**

## 2022-07-25 NOTE — ASSESSMENT & PLAN NOTE
Sodium 128 on admission, today 126  Patient is NPO  Will start IV fluids now   Check urine and serum osmolality studies

## 2022-07-25 NOTE — CASE MANAGEMENT
Case Management Discharge Planning Note    Patient name Ruth Gold  Location [address] 226/-65 MRN [id number]  : 1934 Date 2022       Current Admission Date: 2022  Current Admission Diagnosis:Closed left hip fracture [facility])   Patient Active Problem List    Diagnosis Date Noted    Hyponatremia 2022    Closed left hip fracture ([address] ) 2022    History of TIA (transient ischemic attack) 2022    Breast pain, right 2021    Memory loss 2020    Essential hypertension 2019    Osteoporosis 2019    Dry skin dermatitis 2018    Left carotid bruit 06/15/2018    Hyperlipidemia 06/15/2018    Allergic rhinitis 2017    TIA (transient ischemic attack) 2017    Chronic insomnia 2017    Hypothyroidism, adult 2017      LOS (days): 1  Geometric Mean LOS (GMLOS) (days):   Days to GMLOS:     OBJECTIVE:  Risk of Unplanned Readmission Score: 10 64      Current admission status: Inpatient   Preferred Pharmacy:   [facility], [address] [address]  Phone: [phone] Fax: [phone]    [id number]  Yuliet Govea [address]  [address]  [address] [address] [place] [address]  Phone: [phone] Fax: [phone] [facility] 60  [address] [place] [address] [address]  Phone: [phone] Fax: [phone]    Primary Care Provider: Abhay Carr MD    Primary Insurance: [facility]  Secondary Insurance:     DISCHARGE DETAILS:    Comments - Freedom of Choice: The patient and family has decided not to have the scheduled surgery completed at [facility]  The patient is requesting to be transferred to [facility] in [facility]  The CM advised the patient and family since the surgery is not a higher level of care   [facility], available bed, and in addition to the patients healthcare insurance must approve the discharge  The patient and family verbalized understanding  The medical team and the patients bedside nurse is aware  CM will continue to follow

## 2022-07-25 NOTE — PROGRESS NOTES
[facility]  Progress Note - Grecia Calero 1934, 80 y o  female MRN: [id number]  Unit/Bed#: MS Asad-Melony Encounter: [id number]  Primary Care Provider: Avis Gutierrez MD   Date and time admitted to hospital: 7/24/2022 12:41 PM    * Closed left hip fracture [facility])  Assessment & Plan  Patient presented after fall out of chair with left hip pain  X-ray shows left subcapital femoral neck fracture  Appreciate Ortho input  Currently NPO for hemiarthroplasty this afternoon  Continue pain control, nonweightbearing status  Will need PT/OT postoperatively  Of note, patient is [employer]  Hyponatremia  Assessment & Plan  Sodium 128 on admission, today 126  Patient is NPO  Will start IV fluids now   Check urine and serum osmolality studies    Memory loss  Assessment & Plan  Continue Exelon patch    Essential hypertension  Assessment & Plan  Continue Cozaar    Hyperlipidemia  Assessment & Plan  Continue statin    Hypothyroidism, adult  Assessment & Plan  Recent TSH and T4 were normal in May 2022    VTE Pharmacologic Prophylaxis: VTE Score: 10 High Risk (Score >/= 5) - Pharmacological DVT Prophylaxis Ordered: enoxaparin (Lovenox)  Sequential Compression Devices Ordered  Patient Centered Rounds: I performed bedside rounds with nursing staff today  Discussions with Specialists or Other Care Team Provider: TT nursing    Education and Discussions with Family / Patient: Updated  () via phone  Time Spent for Care: 30 minutes  More than 50% of total time spent on counseling and coordination of care as described above  Current Length of Stay: 1 day(s)  Current Patient Status: Inpatient   Certification Statement: The patient will continue to require additional inpatient hospital stay due to pending ortho surg intervention and PT OT post op   Discharge Plan: Anticipate discharge in 48 hrs to discharge location to be determined pending rehab evaluations      Code Status: Level 1 - Full Code    Subjective:   Patient seen examined at bedside  Denies left hip pain at rest but does state pain is worse with movement  No chest pain or shortness of breath  No fevers or chills  States she only had a few sips of water overnight  Objective:     Vitals:   Temp (24hrs), Av 8 °F (36 6 °C), Min:97 7 °F (36 5 °C), Max:97 9 °F (36 6 °C)    Temp:  [97 7 °F (36 5 °C)-97 9 °F (36 6 °C)] 97 8 °F (36 6 °C)  HR:  [72-79] 77  Resp:  [11-26] 17  BP: (125-183)/(50-84) 138/67  SpO2:  [97 %-100 %] 100 %  Body mass index is 19 52 kg/m²  Input and Output Summary (last 24 hours): Intake/Output Summary (Last 24 hours) at 2022 0840  Last data filed at 2022 0301  Gross per 24 hour   Intake 240 ml   Output 100 ml   Net 140 ml       Physical Exam:   Physical Exam  Constitutional:       Appearance: Normal appearance  She is not ill-appearing  HENT:      Head: Normocephalic and atraumatic  Mouth/Throat:      Mouth: Mucous membranes are dry  Eyes:      Extraocular Movements: Extraocular movements intact  Cardiovascular:      Rate and Rhythm: Normal rate and regular rhythm  Pulmonary:      Effort: Pulmonary effort is normal       Breath sounds: Normal breath sounds  Abdominal:      General: Abdomen is flat  Palpations: Abdomen is soft  Musculoskeletal:         General: Tenderness (left hip TTP) present  No swelling  Normal range of motion  Cervical back: Normal range of motion and neck supple  Skin:     General: Skin is warm and dry  Neurological:      General: No focal deficit present  Mental Status: She is alert     Psychiatric:         Mood and Affect: Mood normal          Behavior: Behavior normal        Additional Data:     Labs:  Results from last 7 days   Lab Units 22  0337 22  2229 22  1413   WBC Thousand/uL 9 40  --  7 14   HEMOGLOBIN g/dL 11 0*  --  11 1*   HEMATOCRIT % 32 9*  --  32 9*   PLATELETS Thousands/uL 227   < > 228 NEUTROS PCT %  --   --  71   LYMPHS PCT %  --   --  17   MONOS PCT %  --   --  10   EOS PCT %  --   --  1    < > = values in this interval not displayed  Results from last 7 days   Lab Units 07/25/22  0337 07/24/22  1413   SODIUM mmol/L 126* 128*   POTASSIUM mmol/L 3 5 3 8   CHLORIDE mmol/L 90* 91*   CO2 mmol/L 28 28   BUN mg/dL 20 23   CREATININE mg/dL 0 89 0 88   ANION GAP mmol/L 8 9   CALCIUM mg/dL 8 8 8 9   ALBUMIN g/dL  --  3 5   TOTAL BILIRUBIN mg/dL  --  0 24   ALK PHOS U/L  --  103   ALT U/L  --  23   AST U/L  --  20   GLUCOSE RANDOM mg/dL 109 125                       Lines/Drains:  Invasive Devices  Report    Peripheral Intravenous Line  Duration           Peripheral IV 08/08/21 Left Antecubital 350 days    Peripheral IV 07/24/22 Left;Ventral (anterior); Proximal Forearm <1 day          Drain  Duration           External Urinary Catheter <1 day                      Imaging: Reviewed radiology reports from this admission including: xray(s)    Recent Cultures (last 7 days):         Last 24 Hours Medication List:   Current Facility-Administered Medications   Medication Dose Route Frequency Provider Last Rate    acetaminophen  650 mg Oral Q6H PRN Radu Sutherland MD      aspirin  81 mg Oral Daily Radu Sutherland MD      atorvastatin  80 mg Oral Daily Radu Sutherland MD      enoxaparin  40 mg Subcutaneous Daily Radu Sutherland MD      losartan  50 mg Oral BID Radu Sutherland MD      melatonin  6 mg Oral HS PRN Allegra Pulido PA-C      morphine injection  1 mg Intravenous Q6H PRN Radu Sutherland MD      ondansetron  4 mg Intravenous Q6H PRN Radu Sutherland MD      oxyCODONE  5 mg Oral Q6H PRN Radu Sutherland MD      rivastigmine  1 patch Transdermal Daily Radu Sutherland MD      rivastigmine  1 patch Transdermal Daily Radu Sutherland MD      sodium chloride  75 mL/hr Intravenous Continuous Tarik Andrade PA-C      tranexamic acid (CYKLOKAPRON) IV bolus  1,000 mg Intravenous Once Nataly Taylor MD      And   Rossi Tesfaye tranexamic acid (CYKLOKAPRON) IV bolus  1,000 mg Intravenous Once Zhanna Little MD          Today, Patient Was Seen By: Lincoln Dubon PA-C    **Please Note: This note may have been constructed using a voice recognition system  **

## 2022-07-25 NOTE — CASE MANAGEMENT
Case Management Assessment & Discharge Planning Note    Patient name Ava Gandara  Location [address] 226/-50 MRN [id number]  : 1934 Date 2022       Current Admission Date: 2022  Current Admission Diagnosis:Closed left hip fracture [facility])   Patient Active Problem List    Diagnosis Date Noted    Hyponatremia 2022    Closed left hip fracture ([address] ) 2022    History of TIA (transient ischemic attack) 2022    Breast pain, right 2021    Memory loss 2020    Essential hypertension 2019    Osteoporosis 2019    Dry skin dermatitis 2018    Left carotid bruit 06/15/2018    Hyperlipidemia 06/15/2018    Allergic rhinitis 2017    TIA (transient ischemic attack) 2017    Chronic insomnia 2017    Hypothyroidism, adult 2017      LOS (days): 1  Geometric Mean LOS (GMLOS) (days):   Days to GMLOS:     OBJECTIVE:    Risk of Unplanned Readmission Score: 9 38      Current admission status: Inpatient     Preferred Pharmacy:   [facility], [address] [address]  Phone: [phone] Fax: [phone]    [id number] [place] [address]  [address] [place] [address]  Phone: [phone] Fax: [phone] [facility]  [facility] 60  [address] [place] [address]  Phone: [phone] Fax: [phone]    Primary Care Provider: Reji Rios MD    Primary Insurance: [facility]  Secondary Insurance:     ASSESSMENT:  Hiro Fuentes Proxies    There are no active Health Care Proxies on file         Advance Directives  Does patient have a Health Care POA?: Yes  Does patient have Advance Directives?: Yes  Advance Directives: Living will    Readmission Root Cause  30 Day Readmission: No    Patient Information  Admitted from[de-identified] Home  Mental Status: Alert  During Assessment patient was accompanied by: Spouse, Daughter  Assessment information provided by[de-identified] Patient  Primary Caregiver: Self  Support Systems: Self, Spouse/significant other, Daughter  [place] of Residence: [place] [address] do you live in?: [place]  Type of Current Residence: [facility]  Living Arrangements: Lives w/ Spouse/significant other    Activities of Daily Living Prior to Admission  Functional Status: Independent  Completes ADLs independently?: Yes  Ambulates independently?: Yes  Does patient use assisted devices?: No  Does patient currently own DME?: No  Does the patient have a history of Short-Term Rehab?: No  Does patient have a history of HHC?: No  Does patient currently have [facility]?: No    Patient Information Continued  Does patient have prescription coverage?: Yes  Does patient receive dialysis treatments?: No  Does patient have a history of Mental Health Diagnosis?: No    Means of Transportation  Means of Transport to Appts[de-identified] Family transport    DISCHARGE DETAILS:    Discharge planning discussed with[de-identified] The patient and her family  Freedom of Choice: Yes  Comments - Freedom of Choice: CM met with the patient and her family at the bedside to complete the initial assessment  The patient was admitted to the hospital for Left hip pain-Closed fractured  The patient is oriented x4 able to make needs known to staff  The patients demographic sheet was verified  The patient lives in a one story house with her  Katy Harrington and was able to complete her ADLs and IADLs before admission  The patient is scheduled surgery hemiarthroplasty today  However, per the family and the patient, she is a [employer]ism and they are deciding on if the patient should have her surgery here at [facility] or be transferred to a bloodless [address] [place] [address]  The patients discharge disposition will be TBD  The patients family will provide transportation home at discharge  CM will continue to follow    CM contacted family/caregiver?: Yes  Were Treatment Team discharge recommendations reviewed with patient/caregiver?: Yes  Did patient/caregiver verbalize understanding of patient care needs?: Yes     Contacts  Patient Contacts: Gilda Macias  Relationship to Patient[de-identified] Family  Contact Method: Phone  Phone Number: [phone]  Reason/Outcome: Continuity of Care, Emergency [address]         Is the patient interested in [facility] at discharge?: No    DME Referral Provided  Referral made for DME?: No

## 2022-07-25 NOTE — ASSESSMENT & PLAN NOTE
Patient presented after fall out of chair with left hip pain  Imaging shows left subcapital femoral neck fracture  Appreciate Ortho input - will need hemiarthroplasty    Continue pain control, nonweightbearing status  Of note, patient is Hinduism   Family requesting transfer to bloodless surgery center at [facility]  Will need PT/OT postoperatively

## 2022-07-25 NOTE — UTILIZATION REVIEW
Initial Clinical Review    Admission: Date/Time/Statement:   Admission Orders (From admission, onward)     Ordered        07/24/22 1432  INPATIENT ADMISSION  Once                      Orders Placed This Encounter   Procedures    INPATIENT ADMISSION     Standing Status:   Standing     Number of Occurrences:   1     Order Specific Question:   Level of Care     Answer:   Med Surg [16]     Order Specific Question:   Estimated length of stay     Answer:   More than 2 Midnights     Order Specific Question:   Certification     Answer:   I certify that inpatient services are medically necessary for this patient for a duration of greater than two midnights  See H&P and MD Progress Notes for additional information about the patient's course of treatment  ED Arrival Information     Expected   -    Arrival   7/24/2022 12:41    Acuity   Urgent            Means of arrival   Ambulance    Escorted by   [facility])    Service   Hospitalist    Admission type   Urgent            Arrival complaint   Hip pain           Chief Complaint   Patient presents with    Hip Pain     Left Hip Pain, reports 8/10        Initial Presentation: 80 y o  female to the ED from home via EMS with complaints of fall, landing on her left hip  Admitted to inpatient for closed left hip fracture  Arrives with decreased ROM of LLE  H/O memory loss, htn, HLD  CT LLE shows: Impacted subcapital left femoral neck fracture  ORtho consult  Low cardiac risk factors  Check BMP  Date: 7/25   Day 2:     PT/OT post operatively  Plan for surgical repair of hip  Patient is Ranjit Peeks witness  [facility]       ED Triage Vitals   Temperature Pulse Respirations Blood Pressure SpO2   07/24/22 1247 07/24/22 1245 07/24/22 1247 07/24/22 1245 07/24/22 1245   97 9 °F (36 6 °C) 79 18 146/76 99 %      Temp Source Heart Rate Source Patient Position - Orthostatic VS BP Location FiO2 (%)   07/24/22 1247 07/24/22 1247 07/24/22 1247 07/24/22 1247 --   Oral Monitor Lying Right arm       Pain Score       07/24/22 1247       8          Wt Readings from Last 1 Encounters:   07/24/22 48 4 kg (106 lb 11 2 oz)     Additional Vital Signs:   Date/Time Temp Pulse Resp BP MAP (mmHg) SpO2 O2 Device Patient Position - Orthostatic VS   07/25/22 11:27:25 97 7 °F (36 5 °C) 69 17 117/59 78 96 % -- --   07/25/22 0900 -- -- -- -- -- 97 % None (Room air) --   07/25/22 08:02:41 97 8 °F (36 6 °C) 77 17 138/67 91 100 % -- --   07/25/22 0300 -- 76 20 -- -- 98 % None (Room air) Lying   07/24/22 21:15:47 97 7 °F (36 5 °C) 78 20 125/50 75 98 % None (Room air) Lying   07/24/22 18:40:27 97 9 °F (36 6 °C) 79 -- 139/65 90 98 % -- --   07/24/22 1730 -- 74 24 Abnormal  164/74 106 98 % None (Room air) Lying   07/24/22 1600 -- 76 24 Abnormal  143/65 93 97 % None (Room air) Lying   07/24/22 1500 -- 76 26 Abnormal  183/84 Abnormal  121 98 % None (Room air) Lying   07/24/22 1400 -- 72 11 Abnormal  160/71 102 100 % -- --   07/24/22 1345 -- 75 18 157/70 100 98 % -- --   07/24/22 1247 97 9 °F (36 6 °C) 77 18 146/76 -- 100 % None (Room air) Lying   07/24/22 1245 -- 79 -- 146/76 105 99 % --      Pertinent Labs/Diagnostic Test Results:     CT pelvis wo contrast   Final Result by Kylee Appiah MD (07/25 6508)      Impacted subcapital left femoral neck fracture  Workstation performed: [id number]         XR hip/pelv 2-3 vws left   ED Interpretation by Ludwig Bradshaw PA-C (07/24 7598)   Fracture      Final Result by Pam Lorenzo MD (07/25 8856)      Acute fracture of the left hip  Workstation performed: [id number]         XR femur 2 vw left   Final Result by Germain Solares MD (07/25 8079)      Acute minimally displaced left subcapital femoral neck fracture  Workstation performed: [id number]         XR chest portable   Final Result by Pam Lorenzo MD (07/25 2784)      No acute cardiopulmonary disease                    Workstation performed: [id number]           Results from last 7 days   Lab Units 07/25/22  0337 07/24/22  2229 07/24/22  1413   WBC Thousand/uL 9 40  --  7 14   HEMOGLOBIN g/dL 11 0*  --  11 1*   HEMATOCRIT % 32 9*  --  32 9*   PLATELETS Thousands/uL 227 235 228   NEUTROS ABS Thousands/µL  --   --  5 06         Results from last 7 days   Lab Units 07/25/22  0337 07/24/22  1413   SODIUM mmol/L 126* 128*   POTASSIUM mmol/L 3 5 3 8   CHLORIDE mmol/L 90* 91*   CO2 mmol/L 28 28   ANION GAP mmol/L 8 9   BUN mg/dL 20 23   CREATININE mg/dL 0 89 0 88   EGFR ml/min/1 73sq m 58 58   CALCIUM mg/dL 8 8 8 9     Results from last 7 days   Lab Units 07/24/22  1413   AST U/L 20   ALT U/L 23   ALK PHOS U/L 103   TOTAL PROTEIN g/dL 7 1   ALBUMIN g/dL 3 5   TOTAL BILIRUBIN mg/dL 0 24         Results from last 7 days   Lab Units 07/25/22  0337 07/24/22  1413   GLUCOSE RANDOM mg/dL 109 125       Past Medical History:   Diagnosis Date    Cognitive decline     Hyperlipidemia     Hypertension     TIA (transient ischemic attack)        Admitting Diagnosis: Hip pain [M25 559]  [facility] [S72 002A]  Age/Sex: 80 y o  female  Admission Orders:  Urine Osmolality  SCDS  NPO  Scheduled Medications:  aspirin, 81 mg, Oral, Daily  atorvastatin, 80 mg, Oral, Daily  enoxaparin, 40 mg, Subcutaneous, Daily  losartan, 50 mg, Oral, BID  rivastigmine, 1 patch, Transdermal, Daily  rivastigmine, 1 patch, Transdermal, Daily  tranexamic acid (CYKLOKAPRON) IV bolus, 1,000 mg, Intravenous, Once   And  tranexamic acid (CYKLOKAPRON) IV bolus, 1,000 mg, Intravenous, Once      Continuous IV Infusions:  sodium chloride, 75 mL/hr, Intravenous, Continuous      PRN Meds:  acetaminophen, 650 mg, Oral, Q6H PRN  melatonin, 6 mg, Oral, HS PRN  morphine injection, 1 mg, Intravenous, Q6H PRN  ondansetron, 4 mg, Intravenous, Q6H PRN  oxyCODONE, 5 mg, Oral, Q6H PRN        IP CONSULT TO ORTHOPEDIC SURGERY    Network Utilization Review Department  ATTENTION: Please call with any questions or concerns to [phone] and carefully listen to the prompts so that you are directed to the right person  All voicemails are confidential   Meghna Davis all requests for admission clinical reviews, approved or denied determinations and any other requests to dedicated fax number below belonging to the campus where the patient is receiving treatment   List of dedicated fax numbers for the Facilities:  [address] DENIALS (Administrative/Medical Necessity) [phone]   [address] (Maternity/NICU/Pediatrics) [phone]   [address]  [address] [address] [place] [address] [address] [facility] [place] [address] [phone]

## 2022-07-25 NOTE — CONSULTS
Orthopedics   Andrae Blevins 80 y o  female MRN: [id number]  Unit/Bed#: -01      Chief Complaint:   Left hip pain    HPI:  80 y o  female complaining of left hip pain  Patient states that she experienced a mechanical fall landing from standing height onto her left hip  Patient states she felt pain immediately and was unable to weight bear after the injury  Patient states that her pain is predominantly located in the hip and becomes worse with direct contact and attempted range of motion of the hip  Patient states that she was taking the ER soon after the injury where x-rays were taken which showed a hip fracture  Patient denies any prior history of hip pain before the injury  Patient has a significant past medical history of chronic insomnia, TIA, left carotid bruit  Patient is a Gnosticism  Patient offers no other complaints at this time      Review Of Systems:   · Skin: Normal  · Neuro: See HPI  · Musculoskeletal: See HPI  · 14 point review of systems negative except as stated above     Past Medical History:   Past Medical History:   Diagnosis Date    Cognitive decline     Hyperlipidemia     Hypertension     TIA (transient ischemic attack)        Past Surgical History:   Past Surgical History:   Procedure Laterality Date    FOOT SURGERY  2007       Family History:  Family history reviewed and non-contributory  Family History   Problem Relation Age of Onset    Parkinsonism Mother     Diabetes Father     Hyperlipidemia Father     Hypertension Father     Other Father         cardiac Disorder    No Known Problems Maternal Grandmother     No Known Problems Maternal Grandfather     No Known Problems Paternal Grandmother     No Known Problems Paternal Grandfather     No Known Problems Sister     Cancer Maternal Aunt     Breast cancer Maternal Aunt         age unknown    No Known Problems Daughter     No Known Problems Son        Social History:  Social History     Socioeconomic History    Marital status: /Civil Union     Spouse name: None    Number of children: 2    Years of education: None    Highest education level: None   Occupational History    Occupation: Retired   Tobacco Use    Smoking status: Former Smoker     Types: Cigarettes     Start date:      Quit date:      Years since quittin 6    Smokeless tobacco: Never Used    Tobacco comment: Quit smoking at 21years of age   [de-identified] Use    Vaping Use: Never used   Substance and Sexual Activity    Alcohol use: Yes     Comment: occasional    Drug use: No    Sexual activity: Not Currently     Partners: Male   Other Topics Concern    None   Social History Narrative    Caffeine use    Graduated from high school    Lives with adult children    Lives with     Denied: History of High risk sexual behavior     Social Determinants of Health     Financial Resource Strain: Not on file   Food Insecurity: Not on file   Transportation Needs: Not on file   Physical Activity: Not on file   Stress: Not on file   Social Connections: Not on file   Intimate Partner Violence: Not on file   Housing Stability: Not on file       Allergies:    Allergies   Allergen Reactions    Latex     Molds & Smuts     Penicillins            Labs:  0   Lab Value Date/Time    HCT 32 9 (L) 2022 0337    HCT 32 9 (L) 2022 1413    HCT 38 5 05/10/2022 0842    HGB 11 0 (L) 2022 0337    HGB 11 1 (L) 2022 1413    HGB 12 0 05/10/2022 0842    WBC 9 40 2022 0337    WBC 7 14 2022 1413    WBC 9 74 05/10/2022 0842       Meds:    Current Facility-Administered Medications:     acetaminophen (TYLENOL) tablet 650 mg, 650 mg, Oral, Q6H PRN, Dez Munoz MD, 650 mg at 22 7757    aspirin (ECOTRIN LOW STRENGTH) EC tablet 81 mg, 81 mg, Oral, Daily, Dez Munoz MD, 81 mg at 22 0935    atorvastatin (LIPITOR) tablet 80 mg, 80 mg, Oral, Daily, Dez Munoz MD, 80 mg at 22 0921    enoxaparin (LOVENOX) subcutaneous injection 40 mg, 40 mg, Subcutaneous, Daily, Jerilyn Marie MD, 40 mg at 07/25/22 0935    losartan (COZAAR) tablet 50 mg, 50 mg, Oral, BID, Jerilyn Marie MD, 50 mg at 07/25/22 9068    melatonin tablet 6 mg, 6 mg, Oral, HS PRN, Allegra Pulido PA-C, 6 mg at 07/24/22 2220    morphine injection 1 mg, 1 mg, Intravenous, Q6H PRN, Jerilyn Marie MD    ondansetron (ZOFRAN) injection 4 mg, 4 mg, Intravenous, Q6H PRN, Jerilyn Marie MD    oxyCODONE (ROXICODONE) IR tablet 5 mg, 5 mg, Oral, Q6H PRN, Jerilyn Marie MD, 5 mg at 07/24/22 2220    rivastigmine (EXELON) 4 6 mg/24 hr TD 24 hr patch 1 patch, 1 patch, Transdermal, Daily, Jerilyn Marie MD    rivastigmine (EXELON) 9 5 mg/24 hr TD 24 hr patch 1 patch, 1 patch, Transdermal, Daily, Jerilyn Marie MD, 1 patch at 07/25/22 0935    sodium chloride 0 9 % infusion, 75 mL/hr, Intravenous, Continuous, Tarik Andrade PA-C, Last Rate: 75 mL/hr at 07/25/22 0935, 75 mL/hr at 07/25/22 0935    tranexamic Acid 1,000 mg in sodium chloride 0 9 % 100 mL IVPB, 1,000 mg, Intravenous, Once **AND** tranexamic Acid 1,000 mg in sodium chloride 0 9 % 100 mL IVPB, 1,000 mg, Intravenous, Once, Miguel Skinner MD    Blood Culture:   No results found for: BLOODCX    Wound Culture:   No results found for: WOUNDCULT    Ins and Outs:  I/O last 24 hours: In: 240 [P O :240]  Out: 100 [Urine:100]          Physical Exam:   /59   Pulse 69   Temp 97 7 °F (36 5 °C)   Resp 17   Ht 5' 2" (1 575 m)   Wt 48 4 kg (106 lb 11 2 oz)   SpO2 96%   BMI 19 52 kg/m²   Gen : Alert and oriented to person, place, time  HEENT: EOMI, eyes clear, moist mucus membranes, hearing intact  Respiratory: Bilateral chest rise  No audible wheezing found  Cardiovascular: Regular Rate and Rhythm  Abdomen: soft nontender/nondistended      Musculoskeletal: left lower extremity  · PATIENT RESTING COMFORTABLY IN HOSPITAL BED IN NO ACUTE DISTRESS  · Skin  :  Left leg is shortened and externally rotated    No other acute visible abnormalities present in the left lower extremity  · TTP  :  Mild tenderness to palpation noted over the anterior hip  No other bony or soft tissue tenderness to palpation noted at this time  · SILT s/s/sp/dp/t  · Motor intact 5/5 strength with ankle dorsi/plantar flexion,  EHL/FHL   Strength of flexion/extension of hip, flexion extension of knee not tested secondary to pain  · 2+ DP pulse    Tertiary: no tenderness over all other joints/long bones as except already stated  Radiology:   I personally reviewed the films  XR hip/pelv 2-3 vws left  acute nondisplaced subcapital left hip fracture       _*_*_*_*_*_*_*_*_*_*_*_*_*_*_*_*_*_*_*_*_*_*_*_*_*_*_*_*_*_*_*_*_*_*_*_*_*_*_*_*_*    Assessment:  80 y  o female with an acute nondisplaced subcapital left hip fracture  Plan:   · NWB left lower extremity  · OR for left hip hemiarthroplasty  Informed consent obtained  · Upon further discussion with patient, patient concerned of blood loss with surgery  Patient and patient's family would like to pursue transfer to [facility] that has [facility] capability at this time  Upon further discussion with patient and patient's family, they opted to proceed with surgery at this [facility]  Unfortunately the patient drank a large glass of water at approximately 12:30 p m   discussed that surgery would be later in the evening if applicable  · Continue NPO at this time   · PreOp clearance   · Stat cbc, bmp, pt/inr, aptt, cxr, ekg, type and screen  · 2Uprbc on hold OR a m  · Ancef on hold OR a m  · Post op PT/OT eval  · Body mass index is 19 52 kg/m²  mildly obese  Recommend behavior modifications, nutrition and physical activity  · Dispo: Ortho will follow  See above for further details  Plan on patient being transferred to higher level of facility  Case management will be involved in the transfer             Burney Oppenheim, PA-C               Portions of the record may have been created with voice recognition software  Occasional wrong word or "sound a like" substitutions may have occurred due to the inherent limitations of voice recognition software  Read the chart carefully and recognize, using context, where substitutions have occurred

## 2022-07-25 NOTE — ASSESSMENT & PLAN NOTE
Sodium 128 after initiation of IVF this AM, will continue NSS   Resume PO diet until plan for surg intervention at outside hospital   Check urine and serum osmolality studies

## 2022-07-25 NOTE — PROGRESS NOTES
Patient alert and oriented x4  Able to verbalize needs and obey commands with limitations  Patient CT scan done during shift- awaiting results  Patient tolerated medication during shift  Patient had complaint of left hip pain during movement  PRN oxycodone administered- effective intervention  Patient slept during shift  No deviation in assessment finding noted when compared to previous documentation  Will continue to monitor

## 2022-07-25 NOTE — PLAN OF CARE
Problem: MOBILITY - ADULT  Goal: Maintain or return to baseline ADL function  Description: INTERVENTIONS:  -  Assess patient's ability to carry out ADLs; assess patient's baseline for ADL function and identify physical deficits which impact ability to perform ADLs (bathing, care of mouth/teeth, toileting, grooming, dressing, etc )  - Assess/evaluate cause of self-care deficits   - Assess range of motion  - Assess patient's mobility; develop plan if impaired  - Assess patient's need for assistive devices and provide as appropriate  - Encourage maximum independence but intervene and supervise when necessary  - Involve family in performance of ADLs  - Assess for home care needs following discharge   - Consider OT consult to assist with ADL evaluation and planning for discharge  - Provide patient education as appropriate  Outcome: Progressing  Goal: Maintains/Returns to pre admission functional level  Description: INTERVENTIONS:  - Perform BMAT or MOVE assessment daily    - Set and communicate daily mobility goal to care team and patient/family/caregiver  - Collaborate with rehabilitation services on mobility goals if consulted  - Perform Range of Motion 3 times a day  - Reposition patient every 2 hours    - Dangle patient 3 times a day  - Stand patient 3 times a day  - Ambulate patient 3 times a day  - Out of bed to chair 3 times a day   - Out of bed for meals 3 times a day  - Out of bed for toileting  - Record patient progress and toleration of activity level   Outcome: Progressing     Problem: Potential for Falls  Goal: Patient will remain free of falls  Description: INTERVENTIONS:  - Educate patient/family on patient safety including physical limitations  - Instruct patient to call for assistance with activity   - Consult OT/PT to assist with strengthening/mobility   - Keep Call bell within reach  - Keep bed low and locked with side rails adjusted as appropriate  - Keep care items and personal belongings within reach  - Initiate and maintain comfort rounds  - Make Fall Risk Sign visible to staff  - Offer Toileting every 2 Hours, in advance of need  - Initiate/Maintain bed alarm  - Obtain necessary fall risk management equipment: call bell within reach  - Apply yellow socks and bracelet for high fall risk patients  - Consider moving patient to room near nurses station  Outcome: Progressing

## 2022-07-26 VITALS
OXYGEN SATURATION: 100 % | WEIGHT: 106.7 LBS | BODY MASS INDEX: 19.64 KG/M2 | HEIGHT: 62 IN | HEART RATE: 83 BPM | RESPIRATION RATE: 16 BRPM | DIASTOLIC BLOOD PRESSURE: 68 MMHG | SYSTOLIC BLOOD PRESSURE: 139 MMHG | TEMPERATURE: 98.1 F

## 2022-07-26 LAB
ATRIAL RATE: 75 BPM
ATRIAL RATE: 85 BPM
P AXIS: 59 DEGREES
P AXIS: 65 DEGREES
PR INTERVAL: 186 MS
PR INTERVAL: 190 MS
QRS AXIS: 23 DEGREES
QRS AXIS: 33 DEGREES
QRSD INTERVAL: 88 MS
QRSD INTERVAL: 94 MS
QT INTERVAL: 394 MS
QT INTERVAL: 400 MS
QTC INTERVAL: 446 MS
QTC INTERVAL: 468 MS
T WAVE AXIS: 32 DEGREES
T WAVE AXIS: 46 DEGREES
VENTRICULAR RATE: 75 BPM
VENTRICULAR RATE: 85 BPM

## 2022-07-26 PROCEDURE — 93010 ELECTROCARDIOGRAM REPORT: CPT | Performed by: INTERNAL MEDICINE

## 2022-07-26 PROCEDURE — 99231 SBSQ HOSP IP/OBS SF/LOW 25: CPT | Performed by: PHYSICIAN ASSISTANT

## 2022-07-26 RX ADMIN — RIVASTIGMINE 1 PATCH: 4.6 PATCH, EXTENDED RELEASE TRANSDERMAL at 09:44

## 2022-07-26 RX ADMIN — LOSARTAN POTASSIUM 50 MG: 50 TABLET, FILM COATED ORAL at 17:51

## 2022-07-26 RX ADMIN — RIVASTIGMINE 1 PATCH: 9.5 PATCH, EXTENDED RELEASE TRANSDERMAL at 09:46

## 2022-07-26 RX ADMIN — ENOXAPARIN SODIUM 40 MG: 40 INJECTION SUBCUTANEOUS at 09:42

## 2022-07-26 RX ADMIN — ATORVASTATIN CALCIUM 80 MG: 40 TABLET, FILM COATED ORAL at 09:42

## 2022-07-26 RX ADMIN — ASPIRIN 81 MG: 81 TABLET ORAL at 09:42

## 2022-07-26 RX ADMIN — LOSARTAN POTASSIUM 50 MG: 50 TABLET, FILM COATED ORAL at 09:42

## 2022-07-26 RX ADMIN — SODIUM CHLORIDE 75 ML/HR: 0.9 INJECTION, SOLUTION INTRAVENOUS at 15:54

## 2022-07-26 NOTE — PROGRESS NOTES
Patient alert and oriented x4  Patient able to verbalize needs and obey commands with limitations  Patient slept for short period of time during shift  Patient assisted with turning while in bed  Patient continues with IV fluid hydration  NPO after midnight  No deviation in assessment finding noted when compared to previous documentation  Will continue to monitor

## 2022-07-26 NOTE — PROGRESS NOTES
Progress Note - Orthopedics   Miguelina Fregoso 80 y o  female MRN: [id number]  Unit/Bed#: -01      Subjective:    80 y  o female currently admitted for a left hip fracture sustained on 7/24/22  Patient states she continues to have pain in her hip associated with range of motion  Patient states she has been compliant with nonweightbearing restriction of the left lower extremity  Patient and family would still like to proceed with transfer at this time  Patient was scheduled for a left hip hemiarthroplasty yesterday but was canceled secondary to ingestion of water a few hours prior to surgery and pursuit of transfer to higher level facility  Patient denies any new worsening symptoms in her leg  Patient denies any fevers any chills  Patient denies any shortness of breath chest tightness chest pain  Patient denies any calf pain  Patient has been placed on Lovenox for DVT prophylaxis  Patient offers no other complaints at this time        Labs:  0   Lab Value Date/Time    HCT 32 9 (L) 07/25/2022 0337    HCT 32 9 (L) 07/24/2022 1413    HCT 38 5 05/10/2022 0842    HGB 11 0 (L) 07/25/2022 0337    HGB 11 1 (L) 07/24/2022 1413    HGB 12 0 05/10/2022 0842    WBC 9 40 07/25/2022 0337    WBC 7 14 07/24/2022 1413    WBC 9 74 05/10/2022 0842       Meds:    Current Facility-Administered Medications:     acetaminophen (TYLENOL) tablet 650 mg, 650 mg, Oral, Q6H PRN, Edward Cee MD, 650 mg at 07/25/22 7514    aspirin (ECOTRIN LOW STRENGTH) EC tablet 81 mg, 81 mg, Oral, Daily, Edward Cee MD, 81 mg at 07/26/22 0942    atorvastatin (LIPITOR) tablet 80 mg, 80 mg, Oral, Daily, Edward Cee MD, 80 mg at 07/26/22 0942    enoxaparin (LOVENOX) subcutaneous injection 40 mg, 40 mg, Subcutaneous, Daily, Edward Cee MD, 40 mg at 07/26/22 0942    losartan (COZAAR) tablet 50 mg, 50 mg, Oral, BID, Edawrd Cee MD, 50 mg at 07/26/22 0942    melatonin tablet 6 mg, 6 mg, Oral, HS PRN, Allegra Pulido PA-C, 6 mg at 07/24/22 2220   morphine injection 1 mg, 1 mg, Intravenous, Q6H PRN, Juan Francisco Rico MD    ondansetron [facility]) injection 4 mg, 4 mg, Intravenous, Q6H PRN, Juan Francisco Rico MD    oxyCODONE (ROXICODONE) IR tablet 5 mg, 5 mg, Oral, Q6H PRN, Juan Francisco Rico MD, 5 mg at 07/24/22 2220    rivastigmine (EXELON) 4 6 mg/24 hr TD 24 hr patch 1 patch, 1 patch, Transdermal, Daily, Juan Francisco Rico MD, 1 patch at 07/26/22 0944    rivastigmine (EXELON) 9 5 mg/24 hr TD 24 hr patch 1 patch, 1 patch, Transdermal, Daily, Juan Francisco Rico MD, 1 patch at 07/26/22 0946    sodium chloride 0 9 % infusion, 75 mL/hr, Intravenous, Continuous, Tarik Andrade PA-C, Last Rate: 75 mL/hr at 07/25/22 0935, 75 mL/hr at 07/25/22 0935    tranexamic Acid 1,000 mg in sodium chloride 0 9 % 100 mL IVPB, 1,000 mg, Intravenous, Once **AND** tranexamic Acid 1,000 mg in sodium chloride 0 9 % 100 mL IVPB, 1,000 mg, Intravenous, Once, Corky Raya MD    Blood Culture:   No results found for: BLOODCX    Wound Culture:   No results found for: WOUNDCULT    Ins and Outs:  I/O last 24 hours: In: 240 [P O :240]  Out: -           Physical:  Vitals:    07/26/22 0724   BP: 139/68   Pulse: 83   Resp:    Temp: 98 1 °F (36 7 °C)   SpO2: 100%     Musculoskeletal: left Lower Extremity  · Skin  :  Left leg is shortened and externally rotated  No other acute visible abnormalities present in the left lower extremity  · TTP  :  Mild tenderness to palpation noted over the anterior hip  No other bony or soft tissue tenderness to palpation noted at this time  · SILT s/s/sp/dp/t  · Motor intact 5/5 strength with ankle dorsi/plantar flexion,  EHL/FHL   Strength of flexion/extension of hip, flexion extension of knee not tested secondary to pain  · 2+ DP pulse      Assessment:    80 y  o female with a left subcapital femoral neck fracture indicated for operative fixation       Plan:  · Nonweightbearing left lower extremity  · [facility] to [facility] impending  · PT/OT   · Pain control per primary team  · DVT ppx per primary team  · Dispo: Ortho will follow  Had a long discussion with patient and patient's family in regards to operative fixation of left hip  Discussed pros and cons of delayed care with patient and patient's family at length  Patient and patient's family still willing to proceed with transfer to higher level of facility at this time  Discussed that if transfer does not occur today/this evening we can offer the patient a left hip hemiarthroplasty with Dr Debra Huff tomorrow  NPO midnight just in case patient is here and transport not ready in morning  See above for additional recommendations  Mindy Michel PA-C           Portions of the record may have been created with voice recognition software  Occasional wrong word or "sound a like" substitutions may have occurred due to the inherent limitations of voice recognition software  Read the chart carefully and recognize, using context, where substitutions have occurred

## 2022-07-26 NOTE — CASE MANAGEMENT
Case Management Discharge Planning Note    Patient name Ronny Williamson  Location [address] 226/-17 MRN [id number]  : 1934 Date 2022       Current Admission Date: 2022  Current Admission Diagnosis:Closed left hip fracture [facility])   Patient Active Problem List    Diagnosis Date Noted    Hyponatremia 2022    Closed left hip fracture ([address] ) 2022    History of TIA (transient ischemic attack) 2022    Breast pain, right 2021    Memory loss 2020    Essential hypertension 2019    Osteoporosis 2019    Dry skin dermatitis 2018    Left carotid bruit 06/15/2018    Hyperlipidemia 06/15/2018    Allergic rhinitis 2017    TIA (transient ischemic attack) 2017    Chronic insomnia 2017    Hypothyroidism, adult 2017      LOS (days): 2  Geometric Mean LOS (GMLOS) (days):   Days to GMLOS:     OBJECTIVE:  Risk of Unplanned Readmission Score: 10 78         Current admission status: Inpatient   Preferred Pharmacy:   [facility], [address] [facility] [address]  Phone: [phone] Fax: [phone]    [id number]  Yuliet Govea [address]  [address] [address] [place] [address] [address] [place] [address]  Phone: [phone] Fax: [phone] [address]  [place] [address]  [address] [place] [address]  Phone: [phone] Fax: [phone]    Primary Care Provider: Eve Ya MD    Primary Insurance: [facility]  Secondary Insurance:     DISCHARGE DETAILS:    Discharge planning discussed with[de-identified] The patient and her family  Freedom of Choice: Yes  Comments - Freedom of Choice: PT AND FAMILY WANTS TRANSFER TO [facility],  CM contacted family/caregiver?: Yes  Were Treatment Team discharge recommendations reviewed with patient/caregiver?: Yes  Did patient/caregiver verbalize understanding of patient care needs?: Yes  Were patient/caregiver advised of the risks associated with not following Treatment Team discharge recommendations?: Yes         Requested [address]         Is the patient interested in [place] [address] at discharge?: No    DME Referral Provided  Referral made for DME?: No    Other Referral/Resources/Interventions Provided:  Interventions: Acute Hospital Transfer  Referral Comments: `CM COMPLETED CMN PLACED IN STICKER CHART AND COPY TO MEDICAL RECORDS    Would you like to participate in our [address] service program?  : No - Declined    Treatment Team Recommendation: [address] Transfer  Discharge Destination Plan[de-identified] [address] Transfer  Transport at Discharge : [address] [place] [address] Ne

## 2022-07-26 NOTE — UTILIZATION REVIEW
Inpatient Admission Authorization Request   NOTIFICATION OF INPATIENT ADMISSION/INPATIENT AUTHORIZATION REQUEST   SERVICING FACILITY:   [address] [place] [address]  Tax ID: [id number]  NPI: [id number]  Place of [facility]patient [facility]  [address]  Place of Service Code: 24     ATTENDING PROVIDER:  Attending Name and NPI#: Minnie Winn, Ace Larson Ayden [[id number]]  Address: [address] [place] [address]  Phone: [phone]     UTILIZATION REVIEW CONTACT:  Vaishali Shine, Utilization   Network Utilization Review Department  Phone: [phone]  Fax [phone]  Email: [email or web address]     PHYSICIAN ADVISORY SERVICES:  FOR JESY-HU-KQMZ REVIEW - MEDICAL NECESSITY DENIAL  Phone: [phone]  Fax: [phone]  Email: [email or web address]     TYPE OF REQUEST:  Inpatient Status     ADMISSION INFORMATION:  ADMISSION DATE/TIME: 7/24/22  2:32 PM  PATIENT DIAGNOSIS CODE/DESCRIPTION:  Hip pain [M25 559]  [facility] fracture of left hip, [facility] ) [S72 002A]  DISCHARGE DATE/TIME: No discharge date for patient encounter  IMPORTANT INFORMATION:  Please contact Vaishali Shine directly with any questions or concerns regarding this request  Department voicemails are confidential     Send requests for admission clinical reviews, concurrent reviews, approvals, and administrative denials due to lack of clinical to fax [phone]

## 2022-07-26 NOTE — CASE MANAGEMENT
Case Management Discharge Planning Note    Patient name Claribel Malik  Location [address] 226/-56 MRN [id number]  : 1934 Date 2022       Current Admission Date: 2022  Current Admission Diagnosis:Closed left hip fracture [facility])   Patient Active Problem List    Diagnosis Date Noted    Hyponatremia 2022    Closed left hip fracture ([address] ) 2022    History of TIA (transient ischemic attack) 2022    Breast pain, right 2021    Memory loss 2020    Essential hypertension 2019    Osteoporosis 2019    Dry skin dermatitis 2018    Left carotid bruit 06/15/2018    Hyperlipidemia 06/15/2018    Allergic rhinitis 2017    TIA (transient ischemic attack) 2017    Chronic insomnia 2017    Hypothyroidism, adult 2017      LOS (days): 2  Geometric Mean LOS (GMLOS) (days):   Days to GMLOS:     OBJECTIVE:  Risk of Unplanned Readmission Score: 10 78         Current admission status: Inpatient   Preferred Pharmacy:   [address] [place] [address] [address]  [facility] [address] [address]  Phone: [phone] Fax: [phone]    [id number] Anish Barrios [address]  [address] [facility] [address] [place] [address]  Phone: [phone] Fax: [phone] [facility], [facility] 60  [address] [address]  Phone: [phone] Fax: [phone]    Primary Care Provider: Berenice Davison MD    Primary Insurance: [facility]  Secondary Insurance:     DISCHARGE DETAILS:    Discharge planning discussed with[de-identified] The patient and her family  Freedom of Choice: Yes  Comments - Freedom of Choice: PT AND FAMILY WANTS TRANSFER TO [facility] FOR BLOODLESS SURGERY,CM SPOKE TO SON PHILLIP AT [address]  TRANSFER    CM contacted family/caregiver?: Yes  Were Treatment Team discharge recommendations reviewed with patient/caregiver?: Yes  Did patient/caregiver verbalize understanding of patient care needs?: Yes  Were patient/caregiver advised of the risks associated with not following Treatment Team discharge recommendations?: Yes    Contacts  Patient Contacts: PHILLIP  Relationship to Patient[de-identified] Family  Contact Method:  In Person  Reason/Outcome: Continuity of Care, Emergency [address]         Is the patient interested in [place] [address] at discharge?: No    DME Referral Provided  Referral made for DME?: No    Other Referral/Resources/Interventions Provided:  Interventions: Acute Hospital Transfer  Referral Comments: `CM COMPLETED CMN PLACED IN STICKER CHART AND COPY TO MEDICAL RECORDS    Would you like to participate in our [address] service program?  : No - Declined    Treatment Team Recommendation: [address] Transfer  Discharge Destination Plan[de-identified] [address] Transfer  Transport at Discharge : [address] [place] [address] Ne

## 2022-07-26 NOTE — CASE MANAGEMENT
Case Management Discharge Planning Note    Patient name Bong Pandey  Location [address] 226/-23 MRN [id number]  : 1934 Date 2022       Current Admission Date: 2022  Current Admission Diagnosis:Closed left hip fracture [facility])   Patient Active Problem List    Diagnosis Date Noted    Hyponatremia 2022    Closed left hip fracture ([address] ) 2022    History of TIA (transient ischemic attack) 2022    Breast pain, right 2021    Memory loss 2020    Essential hypertension 2019    Osteoporosis 2019    Dry skin dermatitis 2018    Left carotid bruit 06/15/2018    Hyperlipidemia 06/15/2018    Allergic rhinitis 2017    TIA (transient ischemic attack) 2017    Chronic insomnia 2017    Hypothyroidism, adult 2017      LOS (days): 2  Geometric Mean LOS (GMLOS) (days):   Days to GMLOS:     OBJECTIVE:  Risk of Unplanned Readmission Score: 10 78         Current admission status: Inpatient   Preferred Pharmacy:   [facility] 21  [address] [address]  Phone: [phone] Fax: [phone]    [facility] [place] [address]  Yuliet [address]ovea [address]  [address] 121[facility] 27 [address] [place] [address]  Phone: [phone] Fax: [phone] [facility] 60  [address] [address]  Phone: [phone] Fax: [phone]    Primary Care Provider: Rupali Perdomo MD    Primary Insurance: [facility]  Secondary Insurance:     DISCHARGE DETAILS:    Discharge planning discussed with[de-identified] The patient and her family  Freedom of Choice: Yes  Comments - Freedom of Choice: PT AND FAMILY WANTS TRANSFER TO [facility] FOR BLOODLESS SURGERY,CM SPOKE TO SON PHILLIP AT [address]  TRANSFER    CM contacted family/caregiver?: Yes  Were Treatment Team discharge recommendations reviewed with patient/caregiver?: Yes  Did patient/caregiver verbalize understanding of patient care needs?: Yes  Were patient/caregiver advised of the risks associated with not following Treatment Team discharge recommendations?: Yes    Contacts  Patient Contacts: PHILLIP  Relationship to Patient[de-identified] Family  Contact Method: In Person  Reason/Outcome: Continuity of Care, Emergency [address]         Is the patient interested in [place] [address] at discharge?: No    DME Referral Provided  Referral made for DME?: No    Other Referral/Resources/Interventions Provided:  Interventions: Acute Hospital Transfer  Referral Comments: CM SENT [address] TASK TO D/C SUPPORT TEAM AND SENT TIGER TEXT TO CONFIRM,    Would you like to participate in our [address] service program?  : No - Declined    Treatment Team Recommendation: [address] Transfer  Discharge Destination Plan[de-identified] Acute Hospital Transfer  Transport at Discharge : [facility]  Dispatcher Contacted: No ([address]  T)                 Transfer Mode: Stretcher  Accompanied by: Alone

## 2022-07-26 NOTE — CASE MANAGEMENT
Case Management Discharge Planning Note    Patient name Sandhya Mckeon  Location [address] 226/-22 MRN [id number]  : 1934 Date 2022       Current Admission Date: 2022  Current Admission Diagnosis:Closed left hip fracture [facility])   Patient Active Problem List    Diagnosis Date Noted    Hyponatremia 2022    Closed left hip fracture ([address] ) 2022    History of TIA (transient ischemic attack) 2022    Breast pain, right 2021    Memory loss 2020    Essential hypertension 2019    Osteoporosis 2019    Dry skin dermatitis 2018    Left carotid bruit 06/15/2018    Hyperlipidemia 06/15/2018    Allergic rhinitis 2017    TIA (transient ischemic attack) 2017    Chronic insomnia 2017    Hypothyroidism, adult 2017      LOS (days): 2  Geometric Mean LOS (GMLOS) (days):   Days to GMLOS:     OBJECTIVE:  Risk of Unplanned Readmission Score: 10 81         Current admission status: Inpatient   Preferred Pharmacy:   [facility], [address] [facility] [address]  Phone: [phone] Fax: [phone]    [id number]   Yuliet Govea 8 [address]  [address] [address]  Phone: [phone] Fax: [phone] [facility]  [facility] 60  [address] [address]  Phone: [phone] Fax: [phone]    Primary Care Provider: Essence Marie MD    Primary Insurance: [facility]  Secondary Insurance:     DISCHARGE DETAILS:    Other Referral/Resources/Interventions Provided:  Interventions: Acute Hospital Transfer         Treatment Team Recommendation: Acute Hospital Transfer  Discharge Destination Plan[de-identified] Acute Hospital Transfer  Transport at Discharge : [facility]  Dispatcher Contacted: Scarlett ([address] AT [address]   CM SPOKE TO SON PHILLIP AND GAVE HIM THIS INFORMATION  AS WELL  )     Transported by [employer] and Unit #): [place]  ETA of Transport (Date): 07/26/22  ETA of Transport (Time): 5325        Accompanied by: Viridiana Mclean Name, [address] : 629 [facility]  Receiving Facility/Agency Phone Number: [phone]

## 2022-07-26 NOTE — PLAN OF CARE
Problem: MOBILITY - ADULT  Goal: Maintain or return to baseline ADL function  Description: INTERVENTIONS:  -  Assess patient's ability to carry out ADLs; assess patient's baseline for ADL function and identify physical deficits which impact ability to perform ADLs (bathing, care of mouth/teeth, toileting, grooming, dressing, etc )  - Assess/evaluate cause of self-care deficits   - Assess range of motion  - Assess patient's mobility; develop plan if impaired  - Assess patient's need for assistive devices and provide as appropriate  - Encourage maximum independence but intervene and supervise when necessary  - Involve family in performance of ADLs  - Assess for home care needs following discharge   - Consider OT consult to assist with ADL evaluation and planning for discharge  - Provide patient education as appropriate  Outcome: Progressing  Goal: Maintains/Returns to pre admission functional level  Description: INTERVENTIONS:  - Perform BMAT or MOVE assessment daily    - Set and communicate daily mobility goal to care team and patient/family/caregiver  - Collaborate with rehabilitation services on mobility goals if consulted  - Perform Range of Motion 3 times a day  - Reposition patient every 2 hours    - Dangle patient 3 times a day  - Stand patient 3 times a day  - Ambulate patient 3 times a day  - Out of bed to chair 3 times a day   - Out of bed for meals 3 times a day  - Out of bed for toileting  - Record patient progress and toleration of activity level   Outcome: Progressing     Problem: Potential for Falls  Goal: Patient will remain free of falls  Description: INTERVENTIONS:  - Educate patient/family on patient safety including physical limitations  - Instruct patient to call for assistance with activity   - Consult OT/PT to assist with strengthening/mobility   - Keep Call bell within reach  - Keep bed low and locked with side rails adjusted as appropriate  - Keep care items and personal belongings within reach  - Initiate and maintain comfort rounds  - Make Fall Risk Sign visible to staff  - Offer Toileting every 2 Hours, in advance of need  - Initiate/Maintain bed alarm  - Obtain necessary fall risk management equipment: call bell within reach  - Apply yellow socks and bracelet for high fall risk patients  - Consider moving patient to room near nurses station  Outcome: Progressing     Problem: Prexisting or High Potential for Compromised Skin Integrity  Goal: Skin integrity is maintained or improved  Description: INTERVENTIONS:  - Identify patients at risk for skin breakdown  - Assess and monitor skin integrity  - Assess and monitor nutrition and hydration status  - Monitor labs   - Assess for incontinence   - Turn and reposition patient  - Assist with mobility/ambulation  - Relieve pressure over bony prominences  - Avoid friction and shearing  - Provide appropriate hygiene as needed including keeping skin clean and dry  - Evaluate need for skin moisturizer/barrier cream  - Collaborate with interdisciplinary team   - Patient/family teaching  - Consider wound care consult   Outcome: Progressing

## 2022-07-27 NOTE — UTILIZATION REVIEW
Notification of Discharge   This is a Notification of Discharge from our facility [address]  Please be advised that this patient has been discharge from our facility  Below you will find the admission and discharge date and time including the patients disposition  UTILIZATION REVIEW CONTACT:  Pat [place]  Utilization   Network Utilization Review Department  Phone: [phone] x carefully listen to the prompts  All voicemails are confidential   Email: [email or web address]     PHYSICIAN ADVISORY SERVICES:  FOR ENNV-DE-FIYA REVIEW - MEDICAL NECESSITY DENIAL  Phone: [phone]  Fax: [phone]  Email: [email or web address]     PRESENTATION DATE: 7/24/2022 12:41 PM  OBERVATION ADMISSION DATE:   INPATIENT ADMISSION DATE: 7/24/22  2:32 PM   DISCHARGE DATE: 7/26/2022  8:56 PM  DISPOSITION: Non [facility] Non [facility]      IMPORTANT INFORMATION:  Send all requests for admission clinical reviews, approved or denied determinations and any other requests to dedicated fax number below belonging to the campus where the patient is receiving treatment   List of dedicated fax numbers:  [address] [place] [address] DENIALS (Administrative/Medical Necessity) [phone]   [address]  (Maternity/NICU/Pediatrics) [phone]   Derrick Leandro [phone]   [facility] [phone]   [address] [place] [address] [phone]   [address] [place] [address] [address] [place] [address],[address] [phone]   [facility]  [phone]   [address] [place] [address], [facility]  [address] [facility] 1000 [address] [facility] [phone]

## 2022-09-08 ENCOUNTER — TELEPHONE (OUTPATIENT)
Dept: INTERNAL MEDICINE CLINIC | Facility: CLINIC | Age: 87
End: 2022-09-08

## 2022-09-08 NOTE — TELEPHONE ENCOUNTER
[facility] homecare called     Pt bp is elevated at 160 / 88  And then  150 /80      No symtpoms,  Just fatigue    Not on her baby asprin  fyi

## 2022-09-09 ENCOUNTER — TELEPHONE (OUTPATIENT)
Dept: INTERNAL MEDICINE CLINIC | Facility: CLINIC | Age: 87
End: 2022-09-09

## 2022-09-09 DIAGNOSIS — L89.156 PRESSURE INJURY OF DEEP TISSUE OF SACRAL REGION: Primary | ICD-10-CM

## 2022-09-09 RX ORDER — SODIUM HYPOCHLORITE 2.5 MG/ML
1 SOLUTION TOPICAL DAILY
Status: SHIPPED | OUTPATIENT
Start: 2022-09-10

## 2022-09-09 NOTE — TELEPHONE ENCOUNTER
Nurse is asking for dakins solution (quarter strength) to be ordered it is a cleanser soap and water isn't really working on the wound    As for the compression stocking they will call back with the size they need for patient

## 2022-09-09 NOTE — TELEPHONE ENCOUNTER
dakins sent to the pharmacy, she will have to make an appointment with cuate to get fitted for her compression stockings

## 2022-09-12 ENCOUNTER — TELEPHONE (OUTPATIENT)
Dept: INTERNAL MEDICINE CLINIC | Facility: CLINIC | Age: 87
End: 2022-09-12

## 2022-09-12 ENCOUNTER — RA CDI HCC (OUTPATIENT)
Dept: OTHER | Facility: HOSPITAL | Age: 87
End: 2022-09-12

## 2022-09-12 NOTE — TELEPHONE ENCOUNTER
Will be addressed at [facility] tomorrow, we did send in medication for this on Friday patient is aware

## 2022-09-12 NOTE — TELEPHONE ENCOUNTER
Has a buttock ulcer    If it can be reassessed and new orders done for [facility] homecare   (pt has appt jac)    Phone - [phone]

## 2022-09-12 NOTE — PROGRESS NOTES
[facility]  coding opportunities       Chart reviewed, no opportunity found:   [address]        Patients Insurance     Medicare Insurance: [employer]

## 2022-09-13 ENCOUNTER — OFFICE VISIT (OUTPATIENT)
Dept: INTERNAL MEDICINE CLINIC | Facility: CLINIC | Age: 87
End: 2022-09-13
Payer: COMMERCIAL

## 2022-09-13 VITALS
HEIGHT: 62 IN | DIASTOLIC BLOOD PRESSURE: 70 MMHG | WEIGHT: 107.8 LBS | HEART RATE: 95 BPM | SYSTOLIC BLOOD PRESSURE: 140 MMHG | RESPIRATION RATE: 16 BRPM | BODY MASS INDEX: 19.84 KG/M2 | OXYGEN SATURATION: 99 %

## 2022-09-13 DIAGNOSIS — R41.3 MEMORY LOSS: ICD-10-CM

## 2022-09-13 DIAGNOSIS — I10 ESSENTIAL HYPERTENSION: Primary | ICD-10-CM

## 2022-09-13 DIAGNOSIS — D50.9 IRON DEFICIENCY ANEMIA, UNSPECIFIED IRON DEFICIENCY ANEMIA TYPE: ICD-10-CM

## 2022-09-13 DIAGNOSIS — S72.002A CLOSED FRACTURE OF LEFT HIP, INITIAL ENCOUNTER (HCC): ICD-10-CM

## 2022-09-13 DIAGNOSIS — E03.9 HYPOTHYROIDISM, ADULT: ICD-10-CM

## 2022-09-13 PROCEDURE — 3725F SCREEN DEPRESSION PERFORMED: CPT | Performed by: INTERNAL MEDICINE

## 2022-09-13 PROCEDURE — 99214 OFFICE O/P EST MOD 30 MIN: CPT | Performed by: INTERNAL MEDICINE

## 2022-09-13 NOTE — PROGRESS NOTES
Assessment/Plan:       Continue current medications but she is due for blood work  She will stay off of the atorvastatin until the blood work  Clarified with her  that she needs to have follow-up with Hematology and Orthopedics  Reinforced that this is important and should be settled before they return to [place] in January  Continue local wound care  Continue follow-up with visiting nurses  Quality Measures:       Return if symptoms worsen or fail to improve  No problem-specific Assessment & Plan notes found for this encounter  Diagnoses and all orders for this visit:    Essential hypertension  -     CBC and differential; Future  -     Comprehensive metabolic panel; Future  -     Lipid panel; Future    Hypothyroidism, adult  -     T4, free; Future  -     TSH, 3rd generation; Future    Memory loss    Closed fracture of left hip, initial encounter ([facility])    Iron deficiency anemia, unspecified iron deficiency anemia type  -     Iron Panel (Includes Ferritin, Iron Sat%, Iron, and TIBC); Future        Subjective:      Patient ID: Christie Mcmanus is a 80 y o  female  Patient comes in today for follow-up with her   She is slowly improving  Still undergoing PT for her hip fracture  But they have not had follow-up with the surgeon since  She also has not had follow-up with Hematology  She had a 2nd admission for anemia secondary to blood loss  She was given iron infusions  Her counts were improving  But she has not had follow-up testing since then  Still using a walker  Notes some swelling in the left leg still  No pain  Blood pressure is doing better  Taking her medicines as directed although when she was in the hospital for anemia, her liver enzymes were elevated as well so her cholesterol medicine has been on hold since then   notes no other problems  No further additions to her history        ALLERGIES:  Allergies   Allergen Reactions    Latex     Molds & Smuts  Penicillins        CURRENT MEDICATIONS:    Current Outpatient Medications:     aspirin (ECOTRIN LOW STRENGTH) 81 mg EC tablet, Take 1 tablet (81 mg total) by mouth daily, Disp: 30 tablet, Rfl: 1    losartan (COZAAR) 50 mg tablet, Take 1 tablet (50 mg total) by mouth 2 (two) times a day, Disp: 60 tablet, Rfl: 3    rivastigmine (EXELON) 4 6 mg/24 hr TD 24 hr patch, Place 1 patch on the skin in the morning, Disp: 90 patch, Rfl: 3    atorvastatin (LIPITOR) 80 mg tablet, Take 1 tablet (80 mg total) by mouth daily (Patient not taking: Reported on 9/13/2022), Disp: 90 tablet, Rfl: 3    Current Facility-Administered Medications:     sodium hypochlorite (DAKIN'S HALF-STRENGTH) 0 25 percent topical solution 1 application, 1 application, Irrigation, Daily, Tonny Be PA-C    ACTIVE PROBLEM LIST:  Patient Active Problem List   Diagnosis    Allergic rhinitis    Chronic insomnia    Hypothyroidism, adult    TIA (transient ischemic attack)    Left carotid bruit    Hyperlipidemia    Dry skin dermatitis    Osteoporosis    Essential hypertension    Memory loss    Breast pain, right    History of TIA (transient ischemic attack)    Closed left hip fracture (HCC)    Hyponatremia       PAST MEDICAL HISTORY:  Past Medical History:   Diagnosis Date    Cognitive decline     Hyperlipidemia     Hypertension     TIA (transient ischemic attack)        PAST SURGICAL HISTORY:  Past Surgical History:   Procedure Laterality Date    FOOT SURGERY  2007    HIP SURGERY Left 07/28/2022       FAMILY HISTORY:  Family History   Problem Relation Age of Onset    Parkinsonism Mother     Diabetes Father     Hyperlipidemia Father     Hypertension Father     Other Father         cardiac Disorder    No Known Problems Maternal Grandmother     No Known Problems Maternal Grandfather     No Known Problems Paternal Grandmother     No Known Problems Paternal Grandfather     No Known Problems Sister     Cancer Maternal Aunt  Breast cancer Maternal Aunt         age unknown    No Known Problems Daughter     No Known Problems Son        SOCIAL HISTORY:  Social History     Socioeconomic History    Marital status: /Civil Union     Spouse name: Not on file    Number of children: 2    Years of education: Not on file    Highest education level: Not on file   Occupational History    Occupation: Retired   Tobacco Use    Smoking status: Former Smoker     Types: Cigarettes     Start date:      Quit date:      Years since quittin 11    Smokeless tobacco: Never Used    Tobacco comment: Quit smoking at 21years of age   [de-identified] Use    Vaping Use: Never used   Substance and Sexual Activity    Alcohol use: Yes     Comment: occasional    Drug use: No    Sexual activity: Not Currently     Partners: Male   Other Topics Concern    Not on file   Social History Narrative    Caffeine use    Graduated from high school    Lives with adult children    Lives with     Denied: History of High risk sexual behavior     Social Determinants of Health     Financial Resource Strain: Not on file   Food Insecurity: Not on file   Transportation Needs: Not on file   Physical Activity: Not on file   Stress: Not on file   Social Connections: Not on file   Intimate Partner Violence: Not on file   Housing Stability: Not on file       Review of Systems   Respiratory: Negative for shortness of breath  Cardiovascular: Negative for chest pain  Gastrointestinal: Negative for abdominal pain  Objective:  Vitals:    22 0856   BP: 140/70   BP Location: Left arm   Patient Position: Sitting   Cuff Size: Adult   Pulse: 95   Resp: 16   SpO2: 99%   Weight: 48 9 kg (107 lb 12 8 oz)   Height: 5' 2" (1 575 m)     Body mass index is 19 72 kg/m²  Physical Exam  Vitals and nursing note reviewed  Constitutional:       Appearance: She is well-developed  Cardiovascular:      Rate and Rhythm: Normal rate and regular rhythm  Heart sounds: Normal heart sounds  Pulmonary:      Effort: Pulmonary effort is normal       Breath sounds: Normal breath sounds  Abdominal:      Palpations: Abdomen is soft  Tenderness: There is no abdominal tenderness  Neurological:      Mental Status: She is alert and oriented to person, place, and time  RESULTS:    No results found for this or any previous visit (from the past 1008 hour(s))  This note was created with voice recognition software  Phonic, grammatical and spelling errors may be present within the note as a result

## 2022-09-16 ENCOUNTER — APPOINTMENT (OUTPATIENT)
Dept: LAB | Facility: HOSPITAL | Age: 87
End: 2022-09-16
Payer: COMMERCIAL

## 2022-09-16 DIAGNOSIS — D50.9 IRON DEFICIENCY ANEMIA, UNSPECIFIED IRON DEFICIENCY ANEMIA TYPE: ICD-10-CM

## 2022-09-16 DIAGNOSIS — E03.9 HYPOTHYROIDISM, ADULT: ICD-10-CM

## 2022-09-16 DIAGNOSIS — I10 ESSENTIAL HYPERTENSION: ICD-10-CM

## 2022-09-16 LAB
ALBUMIN SERPL BCP-MCNC: 3.5 G/DL (ref 3.5–5)
ALP SERPL-CCNC: 124 U/L (ref 46–116)
ALT SERPL W P-5'-P-CCNC: 24 U/L (ref 12–78)
ANION GAP SERPL CALCULATED.3IONS-SCNC: 8 MMOL/L (ref 4–13)
AST SERPL W P-5'-P-CCNC: 19 U/L (ref 5–45)
BASOPHILS # BLD AUTO: 0.07 THOUSANDS/ΜL (ref 0–0.1)
BASOPHILS NFR BLD AUTO: 1 % (ref 0–1)
BILIRUB SERPL-MCNC: 0.31 MG/DL (ref 0.2–1)
BUN SERPL-MCNC: 12 MG/DL (ref 5–25)
CALCIUM SERPL-MCNC: 9.6 MG/DL (ref 8.3–10.1)
CHLORIDE SERPL-SCNC: 105 MMOL/L (ref 96–108)
CHOLEST SERPL-MCNC: 265 MG/DL
CO2 SERPL-SCNC: 26 MMOL/L (ref 21–32)
CREAT SERPL-MCNC: 0.66 MG/DL (ref 0.6–1.3)
EOSINOPHIL # BLD AUTO: 0.22 THOUSAND/ΜL (ref 0–0.61)
EOSINOPHIL NFR BLD AUTO: 2 % (ref 0–6)
ERYTHROCYTE [DISTWIDTH] IN BLOOD BY AUTOMATED COUNT: 20.1 % (ref 11.6–15.1)
FERRITIN SERPL-MCNC: 503 NG/ML (ref 8–388)
GFR SERPL CREATININE-BSD FRML MDRD: 79 ML/MIN/1.73SQ M
GLUCOSE P FAST SERPL-MCNC: 95 MG/DL (ref 65–99)
HCT VFR BLD AUTO: 37.5 % (ref 34.8–46.1)
HDLC SERPL-MCNC: 90 MG/DL
HGB BLD-MCNC: 11.5 G/DL (ref 11.5–15.4)
IMM GRANULOCYTES # BLD AUTO: 0.05 THOUSAND/UL (ref 0–0.2)
IMM GRANULOCYTES NFR BLD AUTO: 1 % (ref 0–2)
IRON SATN MFR SERPL: 25 % (ref 15–50)
IRON SERPL-MCNC: 60 UG/DL (ref 50–170)
LDLC SERPL CALC-MCNC: 159 MG/DL (ref 0–100)
LYMPHOCYTES # BLD AUTO: 1.82 THOUSANDS/ΜL (ref 0.6–4.47)
LYMPHOCYTES NFR BLD AUTO: 18 % (ref 14–44)
MCH RBC QN AUTO: 29.3 PG (ref 26.8–34.3)
MCHC RBC AUTO-ENTMCNC: 30.7 G/DL (ref 31.4–37.4)
MCV RBC AUTO: 96 FL (ref 82–98)
MONOCYTES # BLD AUTO: 0.69 THOUSAND/ΜL (ref 0.17–1.22)
MONOCYTES NFR BLD AUTO: 7 % (ref 4–12)
NEUTROPHILS # BLD AUTO: 7.05 THOUSANDS/ΜL (ref 1.85–7.62)
NEUTS SEG NFR BLD AUTO: 71 % (ref 43–75)
NONHDLC SERPL-MCNC: 175 MG/DL
NRBC BLD AUTO-RTO: 0 /100 WBCS
PLATELET # BLD AUTO: 285 THOUSANDS/UL (ref 149–390)
PMV BLD AUTO: 12.5 FL (ref 8.9–12.7)
POTASSIUM SERPL-SCNC: 3.9 MMOL/L (ref 3.5–5.3)
PROT SERPL-MCNC: 7.5 G/DL (ref 6.4–8.4)
RBC # BLD AUTO: 3.92 MILLION/UL (ref 3.81–5.12)
SODIUM SERPL-SCNC: 139 MMOL/L (ref 135–147)
T4 FREE SERPL-MCNC: 1.07 NG/DL (ref 0.76–1.46)
TIBC SERPL-MCNC: 236 UG/DL (ref 250–450)
TRIGL SERPL-MCNC: 78 MG/DL
TSH SERPL DL<=0.05 MIU/L-ACNC: 3.62 UIU/ML (ref 0.45–4.5)
WBC # BLD AUTO: 9.9 THOUSAND/UL (ref 4.31–10.16)

## 2022-09-16 PROCEDURE — 82728 ASSAY OF FERRITIN: CPT

## 2022-09-16 PROCEDURE — 83540 ASSAY OF IRON: CPT

## 2022-09-16 PROCEDURE — 83550 IRON BINDING TEST: CPT

## 2022-09-16 PROCEDURE — 84439 ASSAY OF FREE THYROXINE: CPT

## 2022-09-16 PROCEDURE — 84443 ASSAY THYROID STIM HORMONE: CPT

## 2022-09-16 PROCEDURE — 85025 COMPLETE CBC W/AUTO DIFF WBC: CPT

## 2022-09-16 PROCEDURE — 80061 LIPID PANEL: CPT

## 2022-09-16 PROCEDURE — 80053 COMPREHEN METABOLIC PANEL: CPT

## 2022-09-16 PROCEDURE — 36415 COLL VENOUS BLD VENIPUNCTURE: CPT

## 2022-09-19 ENCOUNTER — OFFICE VISIT (OUTPATIENT)
Dept: NEUROLOGY | Facility: CLINIC | Age: 87
End: 2022-09-19
Payer: COMMERCIAL

## 2022-09-19 VITALS
HEART RATE: 94 BPM | TEMPERATURE: 97.6 F | DIASTOLIC BLOOD PRESSURE: 70 MMHG | SYSTOLIC BLOOD PRESSURE: 122 MMHG | HEIGHT: 62 IN | WEIGHT: 108 LBS | BODY MASS INDEX: 19.88 KG/M2 | OXYGEN SATURATION: 100 %

## 2022-09-19 DIAGNOSIS — G30.1 LATE ONSET ALZHEIMER'S DEMENTIA WITHOUT BEHAVIORAL DISTURBANCE (HCC): Primary | ICD-10-CM

## 2022-09-19 DIAGNOSIS — F02.80 LATE ONSET ALZHEIMER'S DEMENTIA WITHOUT BEHAVIORAL DISTURBANCE (HCC): Primary | ICD-10-CM

## 2022-09-19 DIAGNOSIS — E78.49 OTHER HYPERLIPIDEMIA: ICD-10-CM

## 2022-09-19 DIAGNOSIS — Z86.73 HISTORY OF TIA (TRANSIENT ISCHEMIC ATTACK): ICD-10-CM

## 2022-09-19 DIAGNOSIS — I10 ESSENTIAL HYPERTENSION: ICD-10-CM

## 2022-09-19 PROCEDURE — 99214 OFFICE O/P EST MOD 30 MIN: CPT | Performed by: PSYCHIATRY & NEUROLOGY

## 2022-09-19 PROCEDURE — 1160F RVW MEDS BY RX/DR IN RCRD: CPT | Performed by: PSYCHIATRY & NEUROLOGY

## 2022-09-19 RX ORDER — CALCIUM CARBONATE 200(500)MG
1 TABLET,CHEWABLE ORAL DAILY
COMMUNITY

## 2022-09-19 NOTE — PROGRESS NOTES
Ashish Mata is a 80 y o  female  Chief Complaint   Patient presents with    Memory difficulty       Assessment:  1  Late onset Alzheimer's dementia without behavioral disturbance ([facility] [address] )    2  Essential hypertension    3  History of TIA (transient ischemic attack)    4  Other hyperlipidemia        Plan:  Continue with Exelon patch 4 6 mg per 24 hour as patient was unable to tolerate the higher dose  Patient to be under constant supervision  Follow-up in 4 months  Discussion:  Differential diagnosis discussed with the patient patient does have some dementia most likely Alzheimer's dementia her [place] is 16/30 in the past was 21/30 she is currently on Exelon patch 4 6 mg per 24 hour and is tolerating it well she could not tolerate the higher dose, I discussed with the patient and the family regarding Namenda they would like to hold off on that since she is recovering from a hip surgery she was advised to continue with mentally stimulating exercises to be under constant supervision on the MRI scan there is some possibility of NPH but given patient's age and her symptoms I do not think that she will be a surgical candidate and they are also not keen to see a neurosurgeon to take fall and safety precautions to go to the hospital if has any worsening symptoms and call me otherwise to see me back in 4 months and follow up with the other physicians      Subjective:    HPI   Patient is here in follow-up for her memory difficulty, since her last visit she recently had a fall about 6 weeks back and had left hip fracture and had a partial hip replacement she is accompanied with her  and she is recovering from that and is able to ambulate with a walker she continues to have some short-term memory issues but her mood is good she lives with her  she does not drive no hallucinations no difficulty in swallowing her appetite is good weight has been stable she could not tolerate the higher dose of Exelon patch and is currently on 4 6 milligram per 24 hour she has not had any more syncopal episodes I had advised her also to follow-up with cardiologist no witnessed seizure activities no headaches no focal weakness no other complaints      Vitals:    09/19/22 1024   BP: 122/70   BP Location: Right arm   Patient Position: Sitting   Cuff Size: Adult   Pulse: 94   Temp: 97 6 °F (36 4 °C)   TempSrc: Probe   SpO2: 100%   Height: 5' 2" (1 575 m)       Current Medications    Current Outpatient Medications:     aspirin (ECOTRIN LOW STRENGTH) 81 mg EC tablet, Take 1 tablet (81 mg total) by mouth daily, Disp: 30 tablet, Rfl: 1    calcium carbonate (TUMS) 500 mg chewable tablet, Chew 1 tablet daily, Disp: , Rfl:     losartan (COZAAR) 50 mg tablet, Take 1 tablet (50 mg total) by mouth 2 (two) times a day, Disp: 60 tablet, Rfl: 3    rivastigmine (EXELON) 4 6 mg/24 hr TD 24 hr patch, Place 1 patch on the skin in the morning, Disp: 90 patch, Rfl: 3    atorvastatin (LIPITOR) 80 mg tablet, Take 1 tablet (80 mg total) by mouth daily (Patient not taking: No sig reported), Disp: 90 tablet, Rfl: 3    Current Facility-Administered Medications:     sodium hypochlorite (DAKIN'S HALF-STRENGTH) 0 25 percent topical solution 1 application, 1 application, Irrigation, Daily, Anne Singletary PA-C      Allergies  Latex, Molds & smuts, and Penicillins    Past Medical History  Past Medical History:   Diagnosis Date    Cognitive decline     Hyperlipidemia     Hypertension     TIA (transient ischemic attack)          Past Surgical History:  Past Surgical History:   Procedure Laterality Date    FOOT SURGERY  2007    HIP SURGERY Left 07/28/2022         Family History:  Family History   Problem Relation Age of Onset    Parkinsonism Mother     Diabetes Father     Hyperlipidemia Father     Hypertension Father     Other Father         cardiac Disorder    No Known Problems Maternal Grandmother     No Known Problems Maternal Grandfather     No Known Problems Paternal Grandmother     No Known Problems Paternal Grandfather     No Known Problems Sister     Cancer Maternal Aunt     Breast cancer Maternal Aunt         age unknown    No Known Problems Daughter     No Known Problems Son        Social History:   reports that she quit smoking about 65 years ago  Her smoking use included cigarettes  She started smoking about 70 years ago  She has never used smokeless tobacco  She reports current alcohol use  She reports that she does not use drugs  I have reviewed the past medical history, surgical history, social and family history, current medications, allergies vitals, review of systems, and updated this information as appropriate today  Objective:    Physical Exam    Neurological Exam    GENERAL:  Cooperative in no acute distress  Well-developed and well-nourished    HEAD and NECK   Head is atraumatic normocephalic with no lesions or masses  Neck is supple with full range of motion    CARDIOVASCULAR  Carotid Arteries-no carotid bruits  NEUROLOGIC:  Mental Status-the patient is awake alert and oriented without aphasia or apraxia, Kite is 16/30  Cranial Nerves: Visual fields are full to confrontation  Extraocular movements are full without nystagmus  Pupils are 2-1/2 mm and reactive  Face is symmetrical to light touch  Movements of facial expression move symmetrically  Hearing is normal to finger rub bilaterally  Soft palate lifts symmetrically  Shoulder shrug is symmetrical  Tongue is midline without atrophy  Motor: No drift is noted on arm extension  Strength is full in the upper and lower extremities with normal bulk and tone  With poor effort in the left lower extremity secondary to recent hip surgery  Ambulates with a walker            ROS:  Review of Systems   Constitutional: Negative  Negative for appetite change and fever  HENT: Negative  Negative for hearing loss, tinnitus, trouble swallowing and voice change  Eyes: Negative    Negative for photophobia and pain  Respiratory: Negative  Negative for shortness of breath  Cardiovascular: Negative  Negative for palpitations  Gastrointestinal: Negative  Negative for nausea and vomiting  Endocrine: Negative  Negative for cold intolerance  Genitourinary: Negative  Negative for dysuria, frequency and urgency  Musculoskeletal: Negative  Negative for myalgias and neck pain  Skin: Negative  Negative for rash  Neurological: Negative  Negative for dizziness, tremors, seizures, syncope, facial asymmetry, speech difficulty, weakness, light-headedness, numbness and headaches  Hematological: Negative  Does not bruise/bleed easily  Psychiatric/Behavioral: Positive for sleep disturbance  Negative for confusion and hallucinations

## 2022-10-05 ENCOUNTER — EVALUATION (OUTPATIENT)
Dept: PHYSICAL THERAPY | Facility: CLINIC | Age: 87
End: 2022-10-05
Payer: COMMERCIAL

## 2022-10-05 DIAGNOSIS — Z96.649 S/P HIP HEMIARTHROPLASTY: Primary | ICD-10-CM

## 2022-10-05 DIAGNOSIS — M25.552 LEFT HIP PAIN: ICD-10-CM

## 2022-10-05 PROCEDURE — 97110 THERAPEUTIC EXERCISES: CPT

## 2022-10-05 PROCEDURE — 97161 PT EVAL LOW COMPLEX 20 MIN: CPT

## 2022-10-05 NOTE — PROGRESS NOTES
PT Evaluation     Today's date: 10/5/2022  Patient name: Sigrid Osler  : 1934  MRN: [id number]  Referring provider: Theresa Cobos MD  Dx:   Encounter Diagnosis     ICD-10-CM    1  S/P hip hemiarthroplasty  Z96 649    2  Left hip pain  M25 552                   Assessment  Assessment details: Pt is a 80 y o  female who presents to PT for evaluation of L hip s/p hemiarthroplasty on 22 after falling off of a stool and sustaining fx to L hip  Patient now presents to physical therapy with impairments including decreased L hip ROM, weakness of L hip girdle and quadriceps, decreased endurance, and impaired balance  Her impairments are contributing to functional limitations including need for assistive device for all ambulation, stair climbing, STS transfers, bed mobility, and IADLs  Patient would benefit from skilled PT in order to reduce impairments and maximize pain free functional mobility  Pt was educated regarding physical therapy diagnosis and the recommended plan of care, as well as the potential risks and benefits of treatment  Impairments: abnormal gait, abnormal or restricted ROM, activity intolerance, impaired balance, impaired physical strength, lacks appropriate home exercise program and pain with function  Functional limitations: stairs, transfers (STS), ambulation, bed mobility  Symptom irritability: lowUnderstanding of Dx/Px/POC: good   Prognosis: good    Goals  STG (4 weeks)  1  Pt to be I in HEP  2 Pt to reduce pain with activity by 50%  3  Pt to increase L hip PROM by 5 degrees in all planes  LTG (By DC)  1 Pt to reduce pain with activity by %  2 Pt to improve FOTO score to predicted dc value  3  Pt to increase L hip girdle strength by 1 full grade in order to improve ability to perform STS transfers, stairs, and other aspects of functional mobility  4  Patient to ascend and descend steps reciprocally    5  Patient to decreased time on 5xSTS to < 15 seconds to reduce risk of falls   6  Patient to ambulate community distances without need for assistive device  Plan  Patient would benefit from: skilled physical therapy  Planned modality interventions: cryotherapy and thermotherapy: hydrocollator packs  Planned therapy interventions: strengthening, stretching, therapeutic activities, therapeutic exercise, flexibility, functional ROM exercises, gait training, graded activity, graded exercise, home exercise program, patient education, postural training, balance, balance/weight bearing training, body mechanics training, abdominal trunk stabilization, activity modification, ADL retraining, joint mobilization, manual therapy, muscle pump exercises, neuromuscular re-education, Torres taping and massage  Frequency: 2x week  Duration in weeks: 8  Plan of Care beginning date: 10/5/2022  Plan of Care expiration date: 12/30/2022  Treatment plan discussed with: patient        Subjective     Etiology of symptoms: Patient fell off a stool and sustained fx to L hip  Hemiarthroplasty done 7/27/22  Location of Pain: L lateral hip pain  Nature of Pain: Dull ache  Current Pain: 3/10  At Best: 0/10  At Worst: 3/10  Aggravating Factors: STS transfers, steps, prolonged walking (> 5 minutes), household chores (vaccuuming / sweeping floors), lifting and carrying  Relieving Factors: N/A  Irritability: low  Stability: improving    Primary functional impairments:  1  Reciprocal ascent and descent of steps  2  STS transfers  3  Prolonged walking (> 5 minutes)    Home setup: Single story home with 2 MIK and BL railing  Patient Goals:  1  Walking without use of assistive device    Objective     Red Flags: Negative for night pain, unexplained weight loss, bowel or bladder dysfunction, saddle anesthesia, hx of Ca, fever, chills, N/V, changes in vision, Headaches, dizziness, gait disturbances    Gait:   Patient currently using RW for ambulation  Utilizing for both balance and PWB      Range of Motion:  Hip PROM   Right Left   Flexion 115 90 p   Extension     Abduction 27 10 p   External Rotation 45 23 p   Internal Rotation 20 10 p       Manual Muscle Testing:  Lower Quarter   Right Left   Hip flexion 4 3+ pain   Hip extension 4 3+ pain   Hip abduction  3 pain   Hip adduction     Hip external rotation 4 3+ pain   Hip internal rotation 4+ 4- pain   Knee flexion 4+ 4   Knee extension 4 3+   Ankle DF 4+ 4-   Ankle PF 4+ 4-     Functional Measures:  STS: Requires BL UE use from armrests to complete  5xSTS: 19 93 seconds w use of BL UE armrests           Precautions: LATEX allergy; s/p hip hemiarthroplasty  PMH: Late onset Alzheimer's disease, HTN, hypothyroidism, hx of TIA, osteoporosis      Manuals             L hip PROM                                                    Neuro Re-Ed             NBOS EO / EC             NBOS on foam             Tandem walking             Standing [employer] balance                                       Ther Ex             Bike             LAQ HEP            Seated add squeeze HEP            Seated clamshells             Supine abduction AROM HEP            Bridges HEP            Supine hip flexion heel slide HEP            Side stepping             Ther Activity             STS foam on chair             Step ups             Gait Training                                       Modalities

## 2022-10-05 NOTE — LETTER
2022    Georgie Nuñez MD  C/Raul Sernaa    Patient: Joey Hernandez   YOB: 1934   Date of Visit: 10/5/2022     Encounter Diagnosis     ICD-10-CM    1  S/P hip hemiarthroplasty  Z96 649    2  Left hip pain  M25 552        Dear Dr Edie Guevara: Thank you for your recent referral of Joey Hernandez  Please review the attached evaluation summary from [facility] recent visit  Please verify that you agree with the plan of care by signing the attached order  If you have any questions or concerns, please do not hesitate to call  I sincerely appreciate the opportunity to share in the care of one of your patients and hope to have another opportunity to work with you in the near future  Sincerely,    Heladio Sunshine, PT      Referring Provider:      I certify that I have read the below Plan of Care and certify the need for these services furnished under this plan of treatment while under my care  MD Alfei Luceroueiredo [phone]  [facility]  [phone]  Via Fax: [phone]          PT Evaluation     Today's date: 10/5/2022  Patient name: Joey Hernandez  : 1934  MRN: [id number]  Referring provider: Maty Boyce MD  Dx:   Encounter Diagnosis     ICD-10-CM    1  S/P hip hemiarthroplasty  Z96 649    2  Left hip pain  M25 552                   Assessment  Assessment details: Pt is a 80 y o  female who presents to PT for evaluation of L hip s/p hemiarthroplasty on 22 after falling off of a stool and sustaining fx to L hip  Patient now presents to physical therapy with impairments including decreased L hip ROM, weakness of L hip girdle and quadriceps, decreased endurance, and impaired balance  Her impairments are contributing to functional limitations including need for assistive device for all ambulation, stair climbing, STS transfers, bed mobility, and IADLs   Patient would benefit from skilled PT in order to reduce impairments and maximize pain free functional mobility  Pt was educated regarding physical therapy diagnosis and the recommended plan of care, as well as the potential risks and benefits of treatment  Impairments: abnormal gait, abnormal or restricted ROM, activity intolerance, impaired balance, impaired physical strength, lacks appropriate home exercise program and pain with function  Functional limitations: stairs, transfers (STS), ambulation, bed mobility  Symptom irritability: lowUnderstanding of Dx/Px/POC: good   Prognosis: good    Goals  STG (4 weeks)  1  Pt to be I in HEP  2 Pt to reduce pain with activity by 50%  3  Pt to increase L hip PROM by 5 degrees in all planes  LTG (By DC)  1 Pt to reduce pain with activity by %  2 Pt to improve FOTO score to predicted dc value  3  Pt to increase L hip girdle strength by 1 full grade in order to improve ability to perform STS transfers, stairs, and other aspects of functional mobility  4  Patient to ascend and descend steps reciprocally  5  Patient to decreased time on 5xSTS to < 15 seconds to reduce risk of falls  6  Patient to ambulate community distances without need for assistive device        Plan  Patient would benefit from: skilled physical therapy  Planned modality interventions: cryotherapy and thermotherapy: hydrocollator packs  Planned therapy interventions: strengthening, stretching, therapeutic activities, therapeutic exercise, flexibility, functional ROM exercises, gait training, graded activity, graded exercise, home exercise program, patient education, postural training, balance, balance/weight bearing training, body mechanics training, abdominal trunk stabilization, activity modification, ADL retraining, joint mobilization, manual therapy, muscle pump exercises, neuromuscular re-education, Torres taping and massage  Frequency: 2x week  Duration in weeks: 8  Plan of Care beginning date: 10/5/2022  Plan of Care expiration date: 12/30/2022  Treatment plan discussed with: patient        Subjective     Etiology of symptoms: Patient fell off a stool and sustained fx to L hip  Hemiarthroplasty done 7/27/22  Location of Pain: L lateral hip pain  Nature of Pain: Dull ache  Current Pain: 3/10  At Best: 0/10  At Worst: 3/10  Aggravating Factors: STS transfers, steps, prolonged walking (> 5 minutes), household chores (vaccuuming / sweeping floors), lifting and carrying  Relieving Factors: N/A  Irritability: low  Stability: improving    Primary functional impairments:  1  Reciprocal ascent and descent of steps  2  STS transfers  3  Prolonged walking (> 5 minutes)    Home setup: Single story home with 2 MIK and BL railing  Patient Goals:  1  Walking without use of assistive device    Objective     Red Flags: Negative for night pain, unexplained weight loss, bowel or bladder dysfunction, saddle anesthesia, hx of Ca, fever, chills, N/V, changes in vision, Headaches, dizziness, gait disturbances    Gait:   Patient currently using RW for ambulation  Utilizing for both balance and PWB      Range of Motion:  Hip PROM   Right Left   Flexion 115 90 p   Extension     Abduction 27 10 p   External Rotation 45 23 p   Internal Rotation 20 10 p       Manual Muscle Testing:  Lower Quarter   Right Left   Hip flexion 4 3+ pain   Hip extension 4 3+ pain   Hip abduction  3 pain   Hip adduction     Hip external rotation 4 3+ pain   Hip internal rotation 4+ 4- pain   Knee flexion 4+ 4   Knee extension 4 3+   Ankle DF 4+ 4-   Ankle PF 4+ 4-     Functional Measures:  STS: Requires BL UE use from armrests to complete  5xSTS: 19 93 seconds w use of BL UE armrests           Precautions: LATEX allergy; s/p hip hemiarthroplasty  PMH: Late onset Alzheimer's disease, HTN, hypothyroidism, hx of TIA, osteoporosis      Manuals             L hip PROM                                                    Neuro Re-Ed             NBOS EO / EC             NBOS on foam Tandem walking             Standing marches             Wobbleboard balance                                       Ther Ex             Bike             LAQ HEP            Seated add squeeze HEP            Seated clamshells             Supine abduction AROM HEP            Bridges HEP            Supine hip flexion heel slide HEP            Side stepping             Ther Activity             STS foam on chair             Step ups             Gait Training                                       Modalities

## 2022-10-07 ENCOUNTER — OFFICE VISIT (OUTPATIENT)
Dept: PHYSICAL THERAPY | Facility: CLINIC | Age: 87
End: 2022-10-07
Payer: COMMERCIAL

## 2022-10-07 DIAGNOSIS — Z96.649 S/P HIP HEMIARTHROPLASTY: Primary | ICD-10-CM

## 2022-10-07 DIAGNOSIS — M25.552 LEFT HIP PAIN: ICD-10-CM

## 2022-10-07 PROCEDURE — 97110 THERAPEUTIC EXERCISES: CPT

## 2022-10-07 PROCEDURE — 97112 NEUROMUSCULAR REEDUCATION: CPT

## 2022-10-07 NOTE — PROGRESS NOTES
Daily Note     Today's date: 10/7/2022  Patient name: Manny Zaldivar  : 1934  MRN: [id number]  Referring provider: Dante Perez MD  Dx:   Encounter Diagnosis     ICD-10-CM    1  S/P hip hemiarthroplasty  Z96 649    2  Left hip pain  M25 552                   Subjective: Pt denies significant pain upon arrival   Her primary complaint is L hip weakness  Objective: See treatment diary below      Assessment: Pt is challenged with SLR and requires assist in order to complete  Decreased proprioception and requires therapist assist to recover from LOB with EC  Patient completes charted exercises without c/o pain or discomfort  Plan: Continue per plan of care        Precautions: LATEX allergy; s/p hip hemiarthroplasty  PMH: Late onset Alzheimer's disease, HTN, hypothyroidism, hx of TIA, osteoporosis      Manuals  10/7           L hip PROM  5'                                                  Neuro Re-Ed             NBOS EO / EC  EC 15"x3           NBOS on foam  c head turns x10           Tandem walking             Standing marches  x20           Wobbleboard balance                                       Ther Ex             Bike  5' no resist           LAQ HEP            Seated add squeeze HEP            Seated clamshells  S/l 2x10           Supine abduction AROM HEP            Bridges HEP 2x10           Supine hip flexion heel slide HEP            Side stepping  3 laps           SKFO  RTB 2x10           Standing hip abd  2x10           Ther Activity             STS foam on chair             Step ups             Gait Training                                       Modalities

## 2022-10-10 ENCOUNTER — OFFICE VISIT (OUTPATIENT)
Dept: PHYSICAL THERAPY | Facility: CLINIC | Age: 87
End: 2022-10-10
Payer: COMMERCIAL

## 2022-10-10 ENCOUNTER — CLINICAL SUPPORT (OUTPATIENT)
Dept: INTERNAL MEDICINE CLINIC | Facility: CLINIC | Age: 87
End: 2022-10-10
Payer: COMMERCIAL

## 2022-10-10 DIAGNOSIS — Z23 NEED FOR INFLUENZA VACCINATION: Primary | ICD-10-CM

## 2022-10-10 DIAGNOSIS — Z96.649 S/P HIP HEMIARTHROPLASTY: Primary | ICD-10-CM

## 2022-10-10 DIAGNOSIS — M25.552 LEFT HIP PAIN: ICD-10-CM

## 2022-10-10 PROCEDURE — G0008 ADMIN INFLUENZA VIRUS VAC: HCPCS

## 2022-10-10 PROCEDURE — 97112 NEUROMUSCULAR REEDUCATION: CPT

## 2022-10-10 PROCEDURE — 90662 IIV NO PRSV INCREASED AG IM: CPT

## 2022-10-10 PROCEDURE — 97530 THERAPEUTIC ACTIVITIES: CPT

## 2022-10-10 PROCEDURE — 97110 THERAPEUTIC EXERCISES: CPT

## 2022-10-10 NOTE — PROGRESS NOTES
Daily Note     Today's date: 10/10/2022  Patient name: Bong Pandey  : 1934  MRN: [id number]  Referring provider: Rod Tee MD  Dx:   Encounter Diagnosis     ICD-10-CM    1  S/P hip hemiarthroplasty  Z96 649    2  Left hip pain  M25 552                   Subjective: Pt offers no new complaints upon arrival to session  Objective: See treatment diary below      Assessment: Less postural sway during EC NBOS but requires therapist assist to shift weight onto L side as pt demonstrates R lateral leaning  Some pain and discomfort with hip flexion  Requires tactile cuing for appropriate set up for hip strengthening exercises  Plan: Continue per plan of care        Precautions: LATEX allergy; s/p hip hemiarthroplasty  PMH: Late onset Alzheimer's disease, HTN, hypothyroidism, hx of TIA, osteoporosis      Manuals  10/7 10/10          L hip PROM  5' 6'                                                 Neuro Re-Ed             NBOS EO / EC  EC 15"x3 EC 15"x3          NBOS on foam  c head turns x10 c head turns x10          Tandem walking             Standing marches  x20 x20          Wobbleboard balance             Bi[employer] LOS   Static x3                       Ther Ex             Bike  5' no resist 5' no resist          LAQ HEP            Seated add squeeze HEP            Seated clamshells  S/l 2x10 S/l RTB x10          Supine abduction AROM HEP            Bridges HEP 2x10 2x10          Supine hip flexion heel slide HEP            Side stepping  3 laps           SKFO  RTB 2x10 RTB 2x10          Standing hip abd  2x10 2x10          Ther Activity             STS foam on chair   2x10          Step ups   Fw/lat 4"x20          Gait Training                                       Modalities

## 2022-10-14 ENCOUNTER — OFFICE VISIT (OUTPATIENT)
Dept: PHYSICAL THERAPY | Facility: CLINIC | Age: 87
End: 2022-10-14
Payer: COMMERCIAL

## 2022-10-14 DIAGNOSIS — Z96.649 S/P HIP HEMIARTHROPLASTY: Primary | ICD-10-CM

## 2022-10-14 DIAGNOSIS — M25.552 LEFT HIP PAIN: ICD-10-CM

## 2022-10-14 PROCEDURE — 97110 THERAPEUTIC EXERCISES: CPT

## 2022-10-14 PROCEDURE — 97112 NEUROMUSCULAR REEDUCATION: CPT

## 2022-10-14 NOTE — PROGRESS NOTES
Daily Note     Today's date: 10/14/2022  Patient name: Ancelmo Mcgarry  : 1934  MRN: [id number]  Referring provider: Rudy Carbajal MD  Dx:   Encounter Diagnosis     ICD-10-CM    1  S/P hip hemiarthroplasty  Z96 649    2  Left hip pain  M25 552                   Subjective: Pt reports she has L groin pain when performing hip flexion  She states she does not feel sore following PT  Objective: See treatment diary below      Assessment: Requires UE assist to stand from chair  Patient ambulates with SPC in clinic today but requires min A VC to facilitate proper gait pattern and usage of cane  Lateral deviations from path while ambulating in clinic  PT still recommending RW and will practice gait training in clinic  Pt demonstrated verbal understanding  Plan: Continue per plan of care        Precautions: LATEX allergy; s/p hip hemiarthroplasty  PMH: Late onset Alzheimer's disease, HTN, hypothyroidism, hx of TIA, osteoporosis      Manuals  10/7 10/10 10/14         L hip PROM  5' 6' 5'                                                Neuro Re-Ed             NBOS EO / EC  EC 15"x3 EC 15"x3 EC 15"x4         NBOS on foam  c head turns x10 c head turns x10          Tandem walking             Standing marches  x20 x20          Wobbleboard balance             Biodex LOS   Static x3 Static x3                      Ther Ex             Bike  5' no resist 5' no resist 5' no resist         LAQ HEP            Seated add squeeze HEP            Seated clamshells  S/l 2x10 S/l RTB x10          Supine abduction AROM HEP            Bridges HEP 2x10 2x10 2x10         Supine hip flexion heel slide HEP   march 2x10         Side stepping  3 laps  4 laps         SKFO  RTB 2x10 RTB 2x10 RTB 2x10         Mini squat    2x10         Standing hip abd  2x10 2x10 2x10         Ther Activity             STS foam on chair   2x10 2x10         Step ups   Fw/lat 4"x20 Fw/lat 4"x20         Gait Training             SPC    5'         Pt education    3'         Modalities

## 2022-10-19 ENCOUNTER — TELEPHONE (OUTPATIENT)
Dept: NEUROLOGY | Facility: CLINIC | Age: 87
End: 2022-10-19

## 2022-10-19 NOTE — TELEPHONE ENCOUNTER
LVM for patient to call office to reschedule appointment with provider as he will not be in office on Dec 19th  We curreently have Nov 23rd, and 28th open appointments

## 2022-10-24 ENCOUNTER — OFFICE VISIT (OUTPATIENT)
Dept: PHYSICAL THERAPY | Facility: CLINIC | Age: 87
End: 2022-10-24
Payer: COMMERCIAL

## 2022-10-24 DIAGNOSIS — Z96.649 S/P HIP HEMIARTHROPLASTY: Primary | ICD-10-CM

## 2022-10-24 DIAGNOSIS — M25.552 LEFT HIP PAIN: ICD-10-CM

## 2022-10-24 PROCEDURE — 97110 THERAPEUTIC EXERCISES: CPT

## 2022-10-24 PROCEDURE — 97112 NEUROMUSCULAR REEDUCATION: CPT

## 2022-10-24 NOTE — PROGRESS NOTES
Daily Note     Today's date: 10/24/2022  Patient name: Romaine Medrano  : 1934  MRN: [id number]  Referring provider: Selvin Cortes MD  Dx:   Encounter Diagnosis     ICD-10-CM    1  S/P hip hemiarthroplasty  Z96 649    2  Left hip pain  M25 552        Start Time: 1014  Stop Time: 1058  Total time in clinic (min): 44 minutes    Subjective: Pt reports that last week she fell when she was walking out of the bathroom and her buttocks has been in pain since then  Pt denies LOC or hitting head  Pt states the pain has been getting better daily since falling  Pt reports she has not checked for bruising  Objective: See treatment diary below      Assessment: Pt arrives with RW today  PT instructed pt to follow up with PCP if pain does not improve or worsens, if bruising is present and worsens, or if activity tolerance decreases, pt verbalized understanding  PT made minimal progressions today secondary to increased pain  Pt unable to perform STS transfer without UE assist, PT cued for nose over toes, putting weight through heels for extra power, and rocking  Pt demonstrated verbal understanding  Plan: Continue per plan of care  Precautions: LATEX allergy; s/p hip hemiarthroplasty  PMH: Late onset Alzheimer's disease, HTN, hypothyroidism, hx of TIA, osteoporosis      Manuals  10/7 10/10 10/14 10/24        L hip PROM  5' 6' 5' 8'                                               Neuro Re-Ed             NBOS EO / EC  EC 15"x3 EC 15"x3 EC 15"x4         NBOS on foam  c head turns x10 c head turns x10  EC 2x30"        Tandem walking             Standing marches  x20 x20          Wobbleboard balance             Biodex LOS   Static x3 Static x3 Static x3                      Ther Ex             Bike  5' no resist 5' no resist 5' no resist P!          LAQ HEP            Seated add squeeze HEP            Seated clamshells  S/l 2x10 S/l RTB x10          Supine abduction AROM HEP            Bridges HEP 2x10 2x10 2x10 2x10        Supine hip flexion heel slide HEP   march 2x10 march 2x10        Side stepping  3 laps  4 laps         SKFO  RTB 2x10 RTB 2x10 RTB 2x10 RTB 2x10        Mini squat    2x10         Standing hip abd  2x10 2x10 2x10 2x10 2# ea        Standing hip ext     2x10 2#ea        Ther Activity             STS foam on chair   2x10 2x10 2x10        Step ups   Fw/lat 4"x20 Fw/lat 4"x20 Fwd/lat 6"x20        Gait Training             SPC    5'         Pt education    3'         Modalities

## 2022-10-28 ENCOUNTER — OFFICE VISIT (OUTPATIENT)
Dept: PHYSICAL THERAPY | Facility: CLINIC | Age: 87
End: 2022-10-28
Payer: COMMERCIAL

## 2022-10-28 DIAGNOSIS — Z96.649 S/P HIP HEMIARTHROPLASTY: Primary | ICD-10-CM

## 2022-10-28 DIAGNOSIS — M25.552 LEFT HIP PAIN: ICD-10-CM

## 2022-10-28 PROCEDURE — 97112 NEUROMUSCULAR REEDUCATION: CPT

## 2022-10-28 PROCEDURE — 97110 THERAPEUTIC EXERCISES: CPT

## 2022-10-28 PROCEDURE — 97530 THERAPEUTIC ACTIVITIES: CPT

## 2022-10-28 NOTE — PROGRESS NOTES
Daily Note     Today's date: 10/28/2022  Patient name: Serina Strauss  : 1934  MRN: [id number]  Referring provider: Michael Suero MD  Dx:   Encounter Diagnosis     ICD-10-CM    1  S/P hip hemiarthroplasty  Z96 649    2  Left hip pain  M25 552                   Subjective: Pt reports her pain from her fall continues to decrease and notes she is feeling better  She states she still has groin pain with hip flexion  Objective: See treatment diary below      Assessment: Pain with initiation of bridges but this decreased with continued repetition  Demonstrates improved control with use of SPC but still some unsteadiness and difficulty with reciprocating gait pattern  Decreased proprioception and is most challenged with NRE performed today  Plan: Continue per plan of care        Precautions: LATEX allergy; s/p hip hemiarthroplasty  PMH: Late onset Alzheimer's disease, HTN, hypothyroidism, hx of TIA, osteoporosis      Manuals  10/7 10/10 10/14 10/24 10/28       L hip PROM  5' 6' 5' 8' 5'                                              Neuro Re-Ed             NBOS EO / EC  EC 15"x3 EC 15"x3 EC 15"x4         NBOS on foam  c head turns x10 c head turns x10  EC 2x30"        Tandem walking             Standing marches  x20 x20          Wobbleboard balance             Biodex LOS   Static x3 Static x3 Static x3  L12 x2 easy                    Ther Ex             Bike  5' no resist 5' no resist 5' no resist P!  6'       LAQ HEP            Seated add squeeze HEP            Seated clamshells  S/l 2x10 S/l RTB x10   S/l RTB 2x10       Supine abduction AROM HEP            Bridges HEP 2x10 2x10 2x10 2x10 2x15       Supine hip flexion heel slide HEP   march 2x10 march 2x10 march 2x10       Side stepping  3 laps  4 laps  RTB 2 laps       SKFO  RTB 2x10 RTB 2x10 RTB 2x10 RTB 2x10 RTB 2x10       Mini squat    2x10         Standing hip abd  2x10 2x10 2x10 2x10 2# ea RTB 2x10       Standing hip ext     2x10 2#ea Ther Activity             STS foam on chair   2x10 2x10 2x10 2x10       Step ups   Fw/lat 4"x20 Fw/lat 4"x20 Fwd/lat 6"x20 Fw/lat 6"x20       Gait Training             SPC    5'  5'       Pt education    3'  3'       Modalities

## 2022-10-31 ENCOUNTER — OFFICE VISIT (OUTPATIENT)
Dept: PHYSICAL THERAPY | Facility: CLINIC | Age: 87
End: 2022-10-31

## 2022-10-31 DIAGNOSIS — M25.552 LEFT HIP PAIN: Primary | ICD-10-CM

## 2022-10-31 DIAGNOSIS — Z96.649 S/P HIP HEMIARTHROPLASTY: ICD-10-CM

## 2022-10-31 NOTE — PROGRESS NOTES
Daily Note     Today's date: 10/31/2022  Patient name: Mague Marrero  : 1934  MRN: [id number]  Referring provider: Joel Ascencio MD  Dx:   Encounter Diagnosis     ICD-10-CM    1  Left hip pain  M25 552    2  S/P hip hemiarthroplasty  Z96 649        Start Time: 1015  Stop Time: 1102  Total time in clinic (min): 47 minutes    Subjective: Pt reports that she is feeling good  She has some pain in her groin still  Objective: See treatment diary below      Assessment: PT focused on standing interventions today, pt was sufficiently challenged  PT added standing hip flexor stretch to pt's HEP secondary to pt report of increased pain and tightness over the L iliopsoas, pt verbalized and demonstrated understanding of proper form  PT will update HEP NV with more strengthening interventions  Plan: Continue per plan of care        Precautions: LATEX allergy; s/p hip hemiarthroplasty  PMH: Late onset Alzheimer's disease, HTN, hypothyroidism, hx of TIA, osteoporosis      Manuals  10/7 10/10 10/14 10/24 10/28 10/31      L hip PROM  5' 6' 5' 8' 5'                                              Neuro Re-Ed             NBOS EO / EC  EC 15"x3 EC 15"x3 EC 15"x4         NBOS on foam  c head turns x10 c head turns x10  EC 2x30"        Tandem walking             Standing   x20 x20          Wobbleboard balance             Biodex LOS   Static x3 Static x3 Static x3  L12 x2 easy L10 x3                   Ther Ex             Bike  5' no resist 5' no resist 5' no resist P!  6' 8'      LAQ HEP      RTB 2x10      Seated add squeeze HEP            Seated clamshells  S/l 2x10 S/l RTB x10   S/l RTB 2x10       Supine abduction AROM HEP            Bridges HEP 2x10 2x10 2x10 2x10 2x15       Supine hip flexion heel slide HEP   march 2x10 march 2x10 march 2x10 standing march 2x10      Side stepping  3 laps  4 laps  RTB 2 laps GTB 3 laps       SKFO  RTB 2x10 RTB 2x10 RTB 2x10 RTB 2x10 RTB 2x10       Mini squat    2x10 Standing hip abd  2x10 2x10 2x10 2x10 2# ea RTB 2x10 RTB 3x10 ea      Standing hip ext     2x10 2#ea  RTB 2x10 ea      Runner's stretch       3x30"      Ther Activity             STS foam on chair   2x10 2x10 2x10 2x10 No foam 2x10 RTB      Step ups   Fw/lat 4"x20 Fw/lat 4"x20 Fwd/lat 6"x20 Fw/lat 6"x20 Fw/lat 8"x10      Gait Training             SPC    5'  5'       Pt education    3'  3'       Modalities

## 2022-11-01 ENCOUNTER — HOSPITAL ENCOUNTER (OUTPATIENT)
Dept: RADIOLOGY | Facility: HOSPITAL | Age: 87
Discharge: HOME | End: 2022-11-01

## 2022-11-03 ENCOUNTER — OFFICE VISIT (OUTPATIENT)
Dept: PHYSICAL THERAPY | Facility: CLINIC | Age: 87
End: 2022-11-03

## 2022-11-03 DIAGNOSIS — Z96.649 S/P HIP HEMIARTHROPLASTY: ICD-10-CM

## 2022-11-03 DIAGNOSIS — M25.552 LEFT HIP PAIN: Primary | ICD-10-CM

## 2022-11-03 NOTE — PROGRESS NOTES
Daily Note     Today's date: 11/3/2022  Patient name: Kim Doshi  : 1934  MRN: [id number]  Referring provider: Jordan Vanegas MD  Dx:   Encounter Diagnosis     ICD-10-CM    1  Left hip pain  M25 552    2  S/P hip hemiarthroplasty  Z96 649        Start Time: 1005  Stop Time: 1052  Total time in clinic (min): 47 minutes    Subjective: Pt reports that the pain in groin isn't too bad, she felt good after last session  Pt states that the hospital gave her exercises to do at home: hip abduction, hip extension, mini squats, heel raises, toe raises  Objective: See treatment diary below      Assessment: Pt focused on balance activities during today's session, pt was sufficiently challenged with dynamic balance  PT notices fear avoidance with SPC use secondary to previous fall, requires cueing for core activation and erect posture for improved balance  PT added SLS, NBOS EO/EC, and tandem stance to pt's HEP, pt verbalized understanding  Plan: Continue per plan of care        Precautions: LATEX allergy; s/p hip hemiarthroplasty  PMH: Late onset Alzheimer's disease, HTN, hypothyroidism, hx of TIA, osteoporosis  Access Code: [facility]         Manuals  10/7 10/10 10/14 10/24 10/28 10/31 11/3     L hip PROM  5' 6' 5' 8' 5'                                              Neuro Re-Ed             NBOS EO / EC  EC 15"x3 EC 15"x3 EC 15"x4    EC 30"x3  Two fingers, 1 finger, no UE support     NBOS on foam  c head turns x10 c head turns x10  EC 2x30"        Tandem walking        Static 30"x2 ea EO/EC     Standing marches  x20 x20     Walking marches c SPC 6mx2 SBA     Wobbleboard balance             Biodex LOS   Static x3 Static x3 Static x3  L12 x2 easy L10 x3      Lateral walking        SPC 6m x2 SBA     Backward walking        Step to 6mx1     Step through 6mx1    SBA                               Ther Ex             Bike  5' no resist 5' no resist 5' no resist P!  6' 8' 8'     LAQ HEP      RTB 2x10      Seated add squeeze HEP            Seated clamshells  S/l 2x10 S/l RTB x10   S/l RTB 2x10       Supine abduction AROM HEP            Bridges HEP 2x10 2x10 2x10 2x10 2x15       Supine hip flexion heel slide HEP   march 2x10 march 2x10 march 2x10 standing march 2x10      Side stepping  3 laps  4 laps  RTB 2 laps GTB 3 laps       SKFO  RTB 2x10 RTB 2x10 RTB 2x10 RTB 2x10 RTB 2x10       Mini squat    2x10         Standing hip abd  2x10 2x10 2x10 2x10 2# ea RTB 2x10 RTB 3x10 ea RTB 3x10 ea     Standing hip ext     2x10 2#ea  RTB 2x10 ea RTB 2x10 ea     Runner's stretch       3x30" 3x30"     Ther Activity             STS foam on chair   2x10 2x10 2x10 2x10 No foam 2x10 RTB      Step ups   Fw/lat 4"x20 Fw/lat 4"x20 Fwd/lat 6"x20 Fw/lat 6"x20 Fw/lat 8"x10      Gait Training             SPC    5'  5'       Pt education    3'  3'       Modalities

## 2022-11-07 ENCOUNTER — OFFICE VISIT (OUTPATIENT)
Dept: PHYSICAL THERAPY | Facility: CLINIC | Age: 87
End: 2022-11-07

## 2022-11-07 DIAGNOSIS — M25.552 LEFT HIP PAIN: Primary | ICD-10-CM

## 2022-11-07 DIAGNOSIS — Z96.649 S/P HIP HEMIARTHROPLASTY: ICD-10-CM

## 2022-11-07 NOTE — PROGRESS NOTES
Daily Note     Today's date: 2022  Patient name: Parth Santa  : 1934  MRN: [id number]  Referring provider: Nhan Roberts MD  Dx:   Encounter Diagnosis     ICD-10-CM    1  Left hip pain  M25 552    2  S/P hip hemiarthroplasty  Z96 649                   Subjective: Pt states she still has L groin pain with hip flexion  Objective: See treatment diary below      Assessment: Some fear avoidance with step ups and NRE  Able to perform 6" step up without hand rail assist and close supervision  Demonstrates weakness in CKC still  Still has difficulty achieving proper gait pattern with use of SPC  Plan: Continue per plan of care        Precautions: LATEX allergy; s/p hip hemiarthroplasty  PMH: Late onset Alzheimer's disease, HTN, hypothyroidism, hx of TIA, osteoporosis  Access Code: [facility]         Manuals  10/7 10/10 10/14 10/24 10/28 10/31 11/3 11/7    L hip PROM  5' 6' 5' 8' 5'                                              Neuro Re-Ed             NBOS EO / EC  EC 15"x3 EC 15"x3 EC 15"x4    EC 30"x3  Two fingers, 1 finger, no UE support SLS 10"x4    NBOS on foam  c head turns x10 c head turns x10  EC 2x30"        Tandem walking        Static 30"x2 ea EO/EC static 30"x2 EO/EC    Standing marches  x20 x20     Walking marches c SPC 6mx2 SBA walking at rail 4 laps    Wobbleboard balance             Biodex LOS   Static x3 Static x3 Static x3  L12 x2 easy L10 x3      Lateral walking        SPC 6m x2 SBA     Backward walking        Step to 6mx1     Step through 6mx1    SBA 4 laps                              Ther Ex             Bike  5' no resist 5' no resist 5' no resist P!  6' 8' 8' 6'    LAQ HEP      RTB 2x10      Seated add squeeze HEP            Seated clamshells  S/l 2x10 S/l RTB x10   S/l RTB 2x10       Supine abduction AROM HEP            Bridges HEP 2x10 2x10 2x10 2x10 2x15       Supine hip flexion heel slide HEP   march 2x10 march 2x10 march 2x10 standing march 2x10      Side stepping  3 laps  4 laps  RTB 2 laps GTB 3 laps   RTB 4 laps    SKFO  RTB 2x10 RTB 2x10 RTB 2x10 RTB 2x10 RTB 2x10       Mini squat    2x10         Standing hip abd  2x10 2x10 2x10 2x10 2# ea RTB 2x10 RTB 3x10 ea RTB 3x10 ea RTB 2x10    Standing hip ext     2x10 2#ea  RTB 2x10 ea RTB 2x10 ea RTB 2x10    Runner's stretch       3x30" 3x30"     TRX squat         2x10    Leg press         45# 2x10    Ther Activity             STS foam on chair   2x10 2x10 2x10 2x10 No foam 2x10 RTB  No foam 2x10    Step ups   Fw/lat 4"x20 Fw/lat 4"x20 Fwd/lat 6"x20 Fw/lat 6"x20 Fw/lat 8"x10  6" fw x15    Gait Training             SPC    5'  5'       Pt education    3'  3'       Modalities

## 2022-11-10 ENCOUNTER — OFFICE VISIT (OUTPATIENT)
Dept: PHYSICAL THERAPY | Facility: CLINIC | Age: 87
End: 2022-11-10

## 2022-11-10 DIAGNOSIS — Z96.649 S/P HIP HEMIARTHROPLASTY: ICD-10-CM

## 2022-11-10 DIAGNOSIS — M25.552 LEFT HIP PAIN: Primary | ICD-10-CM

## 2022-11-10 NOTE — PROGRESS NOTES
Daily Note     Today's date: 11/10/2022  Patient name: Hong Hunt  : 1934  MRN: [id number]  Referring provider: Chris Brenner MD  Dx:   Encounter Diagnosis     ICD-10-CM    1  Left hip pain  M25 552    2  S/P hip hemiarthroplasty  Z96 649                   Subjective: Pt states her L groin pain is improving  Denies L hip pain or soreness upon arrival       Objective: See treatment diary below      Assessment: Patient progressing well with balance  Able to perform tandem stance on foam surface but does utilize hand rail for L lateral LOB  Pt demonstrating improved safety and steadiness while ambulating with SPC  Trialed ambulation without SPC, however patient requires close supervision due to lateral path deviations  Pt instructed to utilize [facility] at home vs  RW at this time  Continues to present with posterior chain weakness  Plan: Continue per plan of care        Precautions: LATEX allergy; s/p hip hemiarthroplasty  PMH: Late onset Alzheimer's disease, HTN, hypothyroidism, hx of TIA, osteoporosis  Access Code: [facility]         Manuals  10/7 10/10 10/14 10/24 10/28 10/31 11/3 11/7 11/10   L hip PROM  5' 6' 5' 8' 5'                                              Neuro Re-Ed             NBOS EO / EC  EC 15"x3 EC 15"x3 EC 15"x4    EC 30"x3  Two fingers, 1 finger, no UE support SLS 10"x4 SLS 5"x10   NBOS on foam  c head turns x10 c head turns x10  EC 2x30"        Tandem walking        Static 30"x2 ea EO/EC static 30"x2 EO/EC Static EO foam 30"x2 ea   Standing marches  x20 x20     Walking marches c SPC 6mx2 SBA walking at rail 4 laps walking at rail 4 laps   Wobbleboard balance             Biodex LOS   Static x3 Static x3 Static x3  L12 x2 easy L10 x3      Lateral walking        SPC 6m x2 SBA     Backward walking        Step to 6mx1     Step through 6mx1    SBA 4 laps 4 laps at hand rail   Lorenzo walking          cones 5 laps CGA>close sup                Ther Ex             Bike  5' no resist 5' no resist 5' no resist P!  6' 8' 8' 6' 6'   LAQ HEP      RTB 2x10      Seated add squeeze HEP            Seated clamshells  S/l 2x10 S/l RTB x10   S/l RTB 2x10       Supine abduction AROM HEP            Bridges HEP 2x10 2x10 2x10 2x10 2x15       Supine hip flexion heel slide HEP   march 2x10 march 2x10 march 2x10 standing march 2x10      Side stepping  3 laps  4 laps  RTB 2 laps GTB 3 laps   RTB 4 laps RTB 4 laps   SKFO  RTB 2x10 RTB 2x10 RTB 2x10 RTB 2x10 RTB 2x10       Mini squat    2x10         Standing hip abd  2x10 2x10 2x10 2x10 2# ea RTB 2x10 RTB 3x10 ea RTB 3x10 ea RTB 2x10 RTB 2x10   Standing hip ext     2x10 2#ea  RTB 2x10 ea RTB 2x10 ea RTB 2x10 RTB 2x10   Runner's stretch       3x30" 3x30"     TRX squat         2x10    Leg press         45# 2x10 45# 2x10   Ther Activity             STS foam on chair   2x10 2x10 2x10 2x10 No foam 2x10 RTB  No foam 2x10 No foam 2x15   Step ups   Fw/lat 4"x20 Fw/lat 4"x20 Fwd/lat 6"x20 Fw/lat 6"x20 Fw/lat 8"x10  6" fw x15 6" fw x15   Gait Training             SPC    5'  5'       Pt education    3'  3'       Modalities

## 2022-11-14 ENCOUNTER — OFFICE VISIT (OUTPATIENT)
Dept: PHYSICAL THERAPY | Facility: CLINIC | Age: 87
End: 2022-11-14

## 2022-11-14 DIAGNOSIS — Z96.649 S/P HIP HEMIARTHROPLASTY: ICD-10-CM

## 2022-11-14 DIAGNOSIS — M25.552 LEFT HIP PAIN: Primary | ICD-10-CM

## 2022-11-14 NOTE — PROGRESS NOTES
Daily Note     Today's date: 2022  Patient name: Jaquita Felty  : 1934  MRN: [id number]  Referring provider: Castillo Erickson MD  Dx:   Encounter Diagnosis     ICD-10-CM    1  Left hip pain  M25 552    2  S/P hip hemiarthroplasty  Z96 649        Start Time: 849  Stop Time: 929  Total time in clinic (min): 40 minutes    Subjective: Pt arrives without an AD  Pt states that she does not use her walker outside of the house, but is still using her RW inside the house secondary to previous fall in the home  Pt states that she does still get L groin pain, but it's not as bad as it was  Pt inquires about which type of cane she should get  Objective: See treatment diary below      Assessment: PT educated pt about cane options, explaining quad cane vs SPC in regards to GIANFRANCO and stability vs ease of use and gait pattern, pt verbalized understanding  Pt continuing with improved dynamic balance, however, still requires SBA for higher level interventions  Global weakness remains present, trunk lean with standing hip abduction/extension able to be corrected with verbal cueing  Pt will continue to benefit from skilled PT in order to improve LLE strength, increase balance  Plan: Continue per plan of care        Precautions: LATEX allergy; s/p hip hemiarthroplasty  PMH: Late onset Alzheimer's disease, HTN, hypothyroidism, hx of TIA, osteoporosis  Access Code: [facility]         Manuals 11/14 10/7 10/10 10/14 10/24 10/28 10/31 11/3 11/7 11/10   L hip PROM  5' 6' 5' 8' 5'                                              Neuro Re-Ed             NBOS EO / EC  EC 15"x3 EC 15"x3 EC 15"x4    EC 30"x3  Two fingers, 1 finger, no UE support SLS 10"x4 SLS 5"x10   NBOS on foam  c head turns x10 c head turns x10  EC 2x30"        Tandem walking Rail support 4 laps        Static 30"x2 ea EO/EC static 30"x2 EO/EC Static EO foam 30"x2 ea   Standing marches walking 6mx4 SBA    RTB 2x10  x20 x20     Walking marches c SPC 6mx2 SBA walking at rail 4 laps walking at rail 4 laps   Wobbleboard balance             Biodex LOS L8/7 x2  Static x3 Static x3 Static x3  L12 x2 easy L10 x3      Lateral walking SBA 6mx2 ea       SPC 6m x2 SBA     Backward walking SBA 2x 6m       Step to 6mx1     Step through 6mx1    SBA 4 laps 4 laps at hand rail   Lorenzo walking          cones 5 laps CGA>close sup                Ther Ex 11/14            Bike 5' 5' no resist 5' no resist 5' no resist P!  6' 8' 8' 6' 6'   LAQ       RTB 2x10      Seated add squeeze             Seated clamshells  S/l 2x10 S/l RTB x10   S/l RTB 2x10       Bridges  2x10 2x10 2x10 2x10 2x15       Supine hip flexion heel slide    march 2x10 march 2x10 march 2x10 standing march 2x10      Side stepping  3 laps  4 laps  RTB 2 laps GTB 3 laps   RTB 4 laps RTB 4 laps   SKFO  RTB 2x10 RTB 2x10 RTB 2x10 RTB 2x10 RTB 2x10       Mini squat 2x10   2x10         Standing hip abd RTB 2x10 2x10 2x10 2x10 2x10 2# ea RTB 2x10 RTB 3x10 ea RTB 3x10 ea RTB 2x10 RTB 2x10   Standing hip ext RTB 2x10    2x10 2#ea  RTB 2x10 ea RTB 2x10 ea RTB 2x10 RTB 2x10   Hamstring curl  RTB 2x10             Runner's stretch       3x30" 3x30"     TRX squat         2x10    Leg press 45# x15        45# 2x10 45# 2x10   Ther Activity             STS foam on chair   2x10 2x10 2x10 2x10 No foam 2x10 RTB  No foam 2x10 No foam 2x15   Step ups   Fw/lat 4"x20 Fw/lat 4"x20 Fwd/lat 6"x20 Fw/lat 6"x20 Fw/lat 8"x10  6" fw x15 6" fw x15   Gait Training             SPC    5'  5'       Pt education    3'  3'       Modalities

## 2022-11-17 ENCOUNTER — APPOINTMENT (OUTPATIENT)
Dept: LAB | Facility: HOSPITAL | Age: 87
End: 2022-11-17

## 2022-11-17 ENCOUNTER — OFFICE VISIT (OUTPATIENT)
Dept: PHYSICAL THERAPY | Facility: CLINIC | Age: 87
End: 2022-11-17

## 2022-11-17 DIAGNOSIS — M25.552 LEFT HIP PAIN: ICD-10-CM

## 2022-11-17 DIAGNOSIS — Z96.649 S/P HIP HEMIARTHROPLASTY: Primary | ICD-10-CM

## 2022-11-17 DIAGNOSIS — M62.81 MUSCLE WEAKNESS (GENERALIZED): ICD-10-CM

## 2022-11-17 LAB
ALBUMIN SERPL BCP-MCNC: 3.3 G/DL (ref 3.5–5)
ALP SERPL-CCNC: 120 U/L (ref 46–116)
ALT SERPL W P-5'-P-CCNC: 18 U/L (ref 12–78)
ANION GAP SERPL CALCULATED.3IONS-SCNC: 5 MMOL/L (ref 4–13)
AST SERPL W P-5'-P-CCNC: 20 U/L (ref 5–45)
BILIRUB SERPL-MCNC: 0.34 MG/DL (ref 0.2–1)
BUN SERPL-MCNC: 20 MG/DL (ref 5–25)
CALCIUM ALBUM COR SERPL-MCNC: 10.2 MG/DL (ref 8.3–10.1)
CALCIUM SERPL-MCNC: 9.6 MG/DL (ref 8.3–10.1)
CHLORIDE SERPL-SCNC: 104 MMOL/L (ref 96–108)
CK SERPL-CCNC: 38 U/L (ref 26–192)
CO2 SERPL-SCNC: 28 MMOL/L (ref 21–32)
CREAT SERPL-MCNC: 0.81 MG/DL (ref 0.6–1.3)
GFR SERPL CREATININE-BSD FRML MDRD: 65 ML/MIN/1.73SQ M
GLUCOSE SERPL-MCNC: 131 MG/DL (ref 65–140)
POTASSIUM SERPL-SCNC: 4.6 MMOL/L (ref 3.5–5.3)
PROT SERPL-MCNC: 8 G/DL (ref 6.4–8.4)
SODIUM SERPL-SCNC: 137 MMOL/L (ref 135–147)

## 2022-11-17 NOTE — PROGRESS NOTES
Daily Note     Today's date: 2022  Patient name: Claribel Malik  : 1934  MRN: [id number]  Referring provider: Nati Hernandez MD  Dx:   Encounter Diagnosis     ICD-10-CM    1  S/P hip hemiarthroplasty  Z96 649       2  Left hip pain  M25 552                      Subjective: Pt states she still experiences L groin pain with hip flexion but this is not present at rest       Objective: See treatment diary below      Assessment: Pt completes dynamic gait activities with CGA for safety  NBOS during these activities and requires VC for wide base of support for decreased risk of falling  Patient continues to demonstrate LE weakness b/l and requires hands to stand from chair  Plan: Continue per plan of care        Precautions: LATEX allergy; s/p hip hemiarthroplasty  PMH: Late onset Alzheimer's disease, HTN, hypothyroidism, hx of TIA, osteoporosis  Access Code: [facility]         Manuals 11/14 11/17 10/10 10/14 10/24 10/28 10/31 11/3 11/7 11/10   L hip PROM   6' 5' 8' 5'                                              Neuro Re-Ed             NBOS EO / EC   EC 15"x3 EC 15"x4    EC 30"x3  Two fingers, 1 finger, no UE support SLS 10"x4 SLS 5"x10   NBOS on foam   c head turns x10  EC 2x30"        Tandem walking Rail support 4 laps  Gait 20'x4 HHA      Static 30"x2 ea EO/EC static 30"x2 EO/EC Static EO foam 30"x2 ea   Standing marches walking 6mx4 SBA    RTB 2x10  walking 20'x4 CGA>close sup x20     Walking marches c SPC 6mx2 SBA walking at rail 4 laps walking at rail 4 laps   Wobbleboard balance             Biodex LOS L8/7 x2 L8 x3 Static x3 Static x3 Static x3  L12 x2 easy L10 x3      Lateral walking SBA 6mx2 ea RTB 4 laps      SPC 6m x2 SBA     Backward walking SBA 2x 6m 20'x4 HHA      Step to 6mx1     Step through 6mx1    SBA 4 laps 4 laps at hand rail   Lorenzo walking          cones 5 laps CGA>close sup                Ther Ex             Bike 5' 5' 5' no resist 5' no resist P!  6' 8' 8' 6' 6'   LAQ RTB 2x10      Seated add squeeze             Seated clamshells   S/l RTB x10   S/l RTB 2x10       Bridges   2x10 2x10 2x10 2x15       Supine hip flexion heel slide    march 2x10 march 2x10 march 2x10 standing march 2x10      Side stepping    4 laps  RTB 2 laps GTB 3 laps   RTB 4 laps RTB 4 laps   SKFO   RTB 2x10 RTB 2x10 RTB 2x10 RTB 2x10       Mini squat 2x10 2x10  2x10         Standing hip abd RTB 2x10 RTB 2x10 2x10 2x10 2x10 2# ea RTB 2x10 RTB 3x10 ea RTB 3x10 ea RTB 2x10 RTB 2x10   Standing hip ext RTB 2x10 RTB 2x10   2x10 2#ea  RTB 2x10 ea RTB 2x10 ea RTB 2x10 RTB 2x10   Hamstring curl  RTB 2x10             Runner's stretch       3x30" 3x30"     TRX squat         2x10    Leg press 45# x15 45# 3x10       45# 2x10 45# 2x10   Ther Activity             STS foam on chair  No foam 2x10 1 UE 2x10 2x10 2x10 2x10 No foam 2x10 RTB  No foam 2x10 No foam 2x15   Step ups   Fw/lat 4"x20 Fw/lat 4"x20 Fwd/lat 6"x20 Fw/lat 6"x20 Fw/lat 8"x10  6" fw x15 6" fw x15   Gait Training             SPC    5'  5'       Pt education    3'  3'       Modalities

## 2022-11-21 ENCOUNTER — OFFICE VISIT (OUTPATIENT)
Dept: PHYSICAL THERAPY | Facility: CLINIC | Age: 87
End: 2022-11-21

## 2022-11-21 DIAGNOSIS — M25.552 LEFT HIP PAIN: ICD-10-CM

## 2022-11-21 DIAGNOSIS — Z96.649 S/P HIP HEMIARTHROPLASTY: Primary | ICD-10-CM

## 2022-11-21 NOTE — PROGRESS NOTES
PT Discharge  Per phone call from pt, she will discontinue formal physical therapy at this time secondary to being in [place]  Pt was instructed to continue with HEP independently and was informed that if she has any questions or concerns she is welcome to contact the facility at any time  Pt is discharged from skilled physical therapy  Daily Note     Today's date: 2022  Patient name: Ban Oliveira  : 1934  MRN: [id number]  Referring provider: Meera Serrato MD  Dx:   Encounter Diagnosis     ICD-10-CM    1  S/P hip hemiarthroplasty  Z96 649       2  Left hip pain  M25 552           Start Time: 1016  Stop Time: 1100  Total time in clinic (min): 44 minutes    Subjective: Pt reports that she is feeling great  She states that she does still get the one spot of pain in her groin, but overall is doing better  Objective: See treatment diary below      Assessment: PT challenged pt with unilateral UE assist with STS transfers, pt only able to complete 3 repetitions before fatigue sets in, will continue to challenge posterior chain LE strength in order to improve STS quality  Plan: Continue per plan of care        Precautions: LATEX allergy; s/p hip hemiarthroplasty  PMH: Late onset Alzheimer's disease, HTN, hypothyroidism, hx of TIA, osteoporosis  Access Code: [facility]         Manuals 11/14 11/17 11/21 10/14 10/24 10/28 10/31 11/3 11/7 11/10   L hip PROM    5' 8' 5'                                              Neuro Re-Ed           NBOS EO / EC    EC 15"x4    EC 30"x3  Two fingers, 1 finger, no UE support SLS 10"x4 SLS 5"x10   NBOS on foam     EC 2x30"        Tandem walking Rail support 4 laps  Gait 20'x4 HHA Gait 6m x 1 HHA     Static 30"x2 ea EO/EC static 30"x2 EO/EC Static EO foam 30"x2 ea   Standing marches walking 6mx4 SBA    RTB 2x10  walking 20'x4 CGA>close sup Gait 6m 1 HHA     Walking marches c SPC 6mx2 SBA walking at rail 4 laps walking at rail 4 laps   Wobbleboard balance             Biodex LOS L8/7 x2 L8 x3 L7 x4 Static x3 Static x3  L12 x2 easy L10 x3      Lateral walking SBA 6mx2 ea RTB 4 laps GTB 4 laps  HRA    Gait 6m HHA     SPC 6m x2 SBA     Backward walking SBA 2x 6m 20'x4 HHA GTB 4 laps HRA     Step to 6mx1     Step through 6mx1    SBA 4 laps 4 laps at hand rail   Lorenzo walking          cones 5 laps CGA>close sup                Ther Ex 11/14 11/21          Bike 5' 5' 6' 5' no resist P!  6' 8' 8' 6' 6'   LAQ       RTB 2x10      Seated add squeeze             Seated clamshells      S/l RTB 2x10       Bridges    2x10 2x10 2x15       Supine hip flexion heel slide    march 2x10 march 2x10 march 2x10 standing march 2x10      Side stepping    4 laps  RTB 2 laps GTB 3 laps   RTB 4 laps RTB 4 laps   SKFO    RTB 2x10 RTB 2x10 RTB 2x10       Mini squat 2x10 2x10 2x10 2x10         Standing hip abd RTB 2x10 RTB 2x10 GTB 3x5 ea 2x10 2x10 2# ea RTB 2x10 RTB 3x10 ea RTB 3x10 ea RTB 2x10 RTB 2x10   Standing hip ext RTB 2x10 RTB 2x10 GTB 3x5 ea  2x10 2#ea  RTB 2x10 ea RTB 2x10 ea RTB 2x10 RTB 2x10   Hamstring curl  RTB 2x10             Runner's stretch       3x30" 3x30"     TRX squat         2x10    Leg press 45# x15 45# 3x10 45# 3x10      45# 2x10 45# 2x10   Ther Activity             STS foam on chair  No foam 2x10 1 UE No foam 3x5 2 UE > 1 UE 2x10 2x10 2x10 No foam 2x10 RTB  No foam 2x10 No foam 2x15   Step ups    Fw/lat 4"x20 Fwd/lat 6"x20 Fw/lat 6"x20 Fw/lat 8"x10  6" fw x15 6" fw x15   Gait Training             SPC    5'  5'       Pt education    3'  3'       Modalities

## 2022-11-23 ENCOUNTER — APPOINTMENT (OUTPATIENT)
Dept: PHYSICAL THERAPY | Facility: CLINIC | Age: 87
End: 2022-11-23

## 2022-11-23 ENCOUNTER — OFFICE VISIT (OUTPATIENT)
Dept: NEUROLOGY | Facility: CLINIC | Age: 87
End: 2022-11-23

## 2022-11-23 VITALS
BODY MASS INDEX: 20.24 KG/M2 | DIASTOLIC BLOOD PRESSURE: 70 MMHG | WEIGHT: 110 LBS | HEIGHT: 62 IN | OXYGEN SATURATION: 98 % | SYSTOLIC BLOOD PRESSURE: 118 MMHG | RESPIRATION RATE: 16 BRPM | HEART RATE: 72 BPM

## 2022-11-23 DIAGNOSIS — F02.80 LATE ONSET ALZHEIMER'S DEMENTIA WITHOUT BEHAVIORAL DISTURBANCE (HCC): Primary | ICD-10-CM

## 2022-11-23 DIAGNOSIS — G30.1 LATE ONSET ALZHEIMER'S DEMENTIA WITHOUT BEHAVIORAL DISTURBANCE (HCC): Primary | ICD-10-CM

## 2022-11-23 DIAGNOSIS — Z86.73 HISTORY OF TIA (TRANSIENT ISCHEMIC ATTACK): ICD-10-CM

## 2022-11-23 DIAGNOSIS — I10 ESSENTIAL HYPERTENSION: ICD-10-CM

## 2022-11-23 NOTE — PROGRESS NOTES
Huy Veloz is a 80 y o  female  Chief Complaint   Patient presents with   • Alzheimer's Disease       Assessment:  No diagnosis found  Plan:  Continue with Exelon patch 4 6 mg per 24 hour  Continue with mentally stimulating exercises and to be under constant supervision  Stroke education given to the patient  Continue with aspirin  Keep blood pressure cholesterol and sugar under control  Follow-up in 6 months  Discussion:  Patient with mild Alzheimer's dementia, her [place] is 21/30 which is improved from before, he is currently on Exelon patch 4 6 mg per 24 hour and could not tolerate a higher dose in the future and she is not keen to go on Namenda, she was advised to continue with mentally stimulating exercises, to take fall and safety precautions, to keep her blood pressure cholesterol and sugar under control, to go to the hospital if has any worsening symptoms and call me otherwise to see me back in 6 months and follow up with the other physicians  Subjective:    HPI   Patient is here in follow-up for her memory difficulty, since her last visit she tells me that she is doing good she had a partial hip replacement and has recovered from that well, she is in physical therapy and ambulates with a cane, feels her memory is much better no difficulty in swallowing no hallucinations appetite is good weight has been stable, she is on Exelon patch 4 6 mg per 24 hour and could not tolerate a higher dose, she is good control of her bowel and bladder she is able to cook at home and able to do her ADLs her  drives, no more syncopal episodes no other complaints      Vitals:    11/23/22 1158   BP: 118/70   BP Location: Right arm   Patient Position: Sitting   Cuff Size: Standard   Pulse: 72   Resp: 16   SpO2: 98%   Weight: 49 9 kg (110 lb)   Height: 5' 2" (1 575 m)       Current Medications    Current Outpatient Medications:   •  aspirin (ECOTRIN LOW STRENGTH) 81 mg EC tablet, Take 1 tablet (81 mg total) by mouth daily, Disp: 30 tablet, Rfl: 1  •  losartan (COZAAR) 50 mg tablet, Take 1 tablet (50 mg total) by mouth 2 (two) times a day, Disp: 60 tablet, Rfl: 3  •  rivastigmine (EXELON) 4 6 mg/24 hr TD 24 hr patch, Place 1 patch on the skin in the morning, Disp: 90 patch, Rfl: 3  •  atorvastatin (LIPITOR) 80 mg tablet, Take 1 tablet (80 mg total) by mouth daily (Patient not taking: Reported on 9/13/2022), Disp: 90 tablet, Rfl: 3  •  calcium carbonate (TUMS) 500 mg chewable tablet, Chew 1 tablet daily (Patient not taking: Reported on 11/23/2022), Disp: , Rfl:     Current Facility-Administered Medications:   •  sodium hypochlorite (DAKIN'S HALF-STRENGTH) 0 25 percent topical solution 1 application, 1 application, Irrigation, Daily, María Stuart PA-C      Allergies  Latex, Molds & smuts, and Penicillins    Past Medical History  Past Medical History:   Diagnosis Date   • Cognitive decline    • Hyperlipidemia    • Hypertension    • TIA (transient ischemic attack)          Past Surgical History:  Past Surgical History:   Procedure Laterality Date   • FOOT SURGERY  2007   • HIP SURGERY Left 07/28/2022         Family History:  Family History   Problem Relation Age of Onset   • Parkinsonism Mother    • Diabetes Father    • Hyperlipidemia Father    • Hypertension Father    • Other Father         cardiac Disorder   • No Known Problems Maternal Grandmother    • No Known Problems Maternal Grandfather    • No Known Problems Paternal Grandmother    • No Known Problems Paternal Grandfather    • No Known Problems Sister    • Cancer Maternal Aunt    • Breast cancer Maternal Aunt         age unknown   • No Known Problems Daughter    • No Known Problems Son        Social History:   reports that she quit smoking about 65 years ago  Her smoking use included cigarettes  She started smoking about 70 years ago  She has never used smokeless tobacco  She reports current alcohol use  She reports that she does not use drugs      I have reviewed the past medical history, surgical history, social and family history, current medications, allergies vitals, review of systems, and updated this information as appropriate today  Objective:    Physical Exam    Neurological Exam    GENERAL:  Cooperative in no acute distress  Well-developed and well-nourished    HEAD and NECK   Head is atraumatic normocephalic with no lesions or masses  Neck is supple with full range of motion    CARDIOVASCULAR  Carotid Arteries-no carotid bruits  NEUROLOGIC:  Mental Status-the patient is awake alert and oriented without aphasia or apraxia, [place] is 21/30  Cranial Nerves: Visual fields are full to confrontation  Extraocular movements are full without nystagmus  Pupils are 2-1/2 mm and reactive  Face is symmetrical to light touch  Movements of facial expression move symmetrically  Hearing is normal to finger rub bilaterally  Soft palate lifts symmetrically  Shoulder shrug is symmetrical  Tongue is midline without atrophy  Motor: No drift is noted on arm extension  Strength is full in the upper and lower extremities with normal bulk and tone  With slightly poor effort in the left lower extremity secondary to hip surgery  Ambulates with a cane          ROS:  Review of Systems   Constitutional: Negative  Negative for appetite change and fever  HENT: Negative  Negative for hearing loss, tinnitus, trouble swallowing and voice change  Eyes: Negative  Negative for photophobia, pain and visual disturbance  Respiratory: Negative  Negative for shortness of breath  Cardiovascular: Negative  Negative for palpitations  Gastrointestinal: Negative  Negative for nausea and vomiting  Endocrine: Negative  Negative for cold intolerance  Genitourinary: Negative  Negative for dysuria, frequency and urgency  Musculoskeletal: Negative  Negative for gait problem, myalgias and neck pain  Skin: Negative  Negative for rash  Allergic/Immunologic: Negative  Neurological: Negative  Negative for dizziness, tremors, seizures, syncope, facial asymmetry, speech difficulty, weakness, light-headedness, numbness and headaches  Hematological: Negative  Does not bruise/bleed easily  Psychiatric/Behavioral: Positive for confusion  Negative for hallucinations and sleep disturbance

## 2022-12-01 ENCOUNTER — CLINICAL SUPPORT (OUTPATIENT)
Dept: INTERNAL MEDICINE CLINIC | Facility: CLINIC | Age: 87
End: 2022-12-01

## 2022-12-01 ENCOUNTER — TELEPHONE (OUTPATIENT)
Dept: INTERNAL MEDICINE CLINIC | Facility: CLINIC | Age: 87
End: 2022-12-01

## 2022-12-01 VITALS — DIASTOLIC BLOOD PRESSURE: 80 MMHG | SYSTOLIC BLOOD PRESSURE: 136 MMHG

## 2022-12-01 DIAGNOSIS — I10 ESSENTIAL HYPERTENSION: Primary | ICD-10-CM

## 2022-12-01 NOTE — TELEPHONE ENCOUNTER
----- Message from [place], [place] sent at 12/1/2022  1:51 PM EST -----  Regarding: bp check  Dirk Bricenoier stopped in for her BP Check today 136/80  She takes half of a losartan in the am and half in the evening, She says that the losartan makes her tired  Is there anything that can be changed?

## 2022-12-01 NOTE — TELEPHONE ENCOUNTER
As per Dr De Pringle, "Try cutting it further to just half a tablet once a day   Follow-up in the office in 2 weeks on this regimen "    Patient made aware and verbalized understanding

## 2022-12-19 ENCOUNTER — TELEPHONE (OUTPATIENT)
Dept: NEUROLOGY | Facility: CLINIC | Age: 87
End: 2022-12-19

## 2022-12-19 ENCOUNTER — OFFICE VISIT (OUTPATIENT)
Dept: INTERNAL MEDICINE CLINIC | Facility: CLINIC | Age: 87
End: 2022-12-19

## 2022-12-19 VITALS
HEART RATE: 77 BPM | HEIGHT: 62 IN | RESPIRATION RATE: 16 BRPM | BODY MASS INDEX: 19.14 KG/M2 | OXYGEN SATURATION: 98 % | DIASTOLIC BLOOD PRESSURE: 90 MMHG | WEIGHT: 104 LBS | TEMPERATURE: 98.6 F | SYSTOLIC BLOOD PRESSURE: 148 MMHG

## 2022-12-19 DIAGNOSIS — I10 ESSENTIAL HYPERTENSION: ICD-10-CM

## 2022-12-19 RX ORDER — LOSARTAN POTASSIUM 50 MG/1
50 TABLET ORAL EVERY EVENING
Qty: 60 TABLET | Refills: 3 | Status: SHIPPED | OUTPATIENT
Start: 2022-12-19

## 2022-12-19 NOTE — PROGRESS NOTES
Assessment/Plan:     While they admit that the fatigue is not overwhelming, we will try 1 less adjustment  Start taking the entire tablet of losartan in the p m  Quality Measures:       Return in about 1 week (around 12/26/2022) for nurse BP check  No problem-specific Assessment & Plan notes found for this encounter  Diagnoses and all orders for this visit:    Essential hypertension  -     losartan (COZAAR) 50 mg tablet; Take 1 tablet (50 mg total) by mouth every evening        Subjective:      Patient ID: Yuli Renae is a 80 y o  female  Patient comes in today for follow-up of the blood pressure  Her blood pressure was good at the last check but she complained of fatigue from the medicine  I tried to let her take just a half a tablet once a day instead of twice a day but that is not controlling her pressure  They do admit that the fatigue is not overwhelming        ALLERGIES:  Allergies   Allergen Reactions   • Latex    • Molds & Smuts    • Penicillins        CURRENT MEDICATIONS:    Current Outpatient Medications:   •  aspirin (ECOTRIN LOW STRENGTH) 81 mg EC tablet, Take 1 tablet (81 mg total) by mouth daily, Disp: 30 tablet, Rfl: 1  •  losartan (COZAAR) 50 mg tablet, Take 1 tablet (50 mg total) by mouth every evening, Disp: 60 tablet, Rfl: 3  •  rivastigmine (EXELON) 4 6 mg/24 hr TD 24 hr patch, Place 1 patch on the skin in the morning, Disp: 90 patch, Rfl: 3  •  atorvastatin (LIPITOR) 80 mg tablet, Take 1 tablet (80 mg total) by mouth daily (Patient not taking: Reported on 9/13/2022), Disp: 90 tablet, Rfl: 3  •  calcium carbonate (TUMS) 500 mg chewable tablet, Chew 1 tablet daily (Patient not taking: Reported on 11/23/2022), Disp: , Rfl:     Current Facility-Administered Medications:   •  sodium hypochlorite (DAKIN'S HALF-STRENGTH) 0 25 percent topical solution 1 application, 1 application, Irrigation, Daily, Jacinta Rivera PA-C    ACTIVE PROBLEM LIST:  Patient Active Problem List Diagnosis   • Allergic rhinitis   • Chronic insomnia   • Hypothyroidism, adult   • TIA (transient ischemic attack)   • Left carotid bruit   • Hyperlipidemia   • Dry skin dermatitis   • Osteoporosis   • Essential hypertension   • Memory loss   • Breast pain, right   • History of TIA (transient ischemic attack)   • Closed left hip fracture (HCC)   • Hyponatremia   • Late onset Alzheimer's dementia without behavioral disturbance (HCC)       PAST MEDICAL HISTORY:  Past Medical History:   Diagnosis Date   • Cognitive decline    • Hyperlipidemia    • Hypertension    • TIA (transient ischemic attack)        PAST SURGICAL HISTORY:  Past Surgical History:   Procedure Laterality Date   • FOOT SURGERY     • HIP SURGERY Left 2022       FAMILY HISTORY:  Family History   Problem Relation Age of Onset   • Parkinsonism Mother    • Diabetes Father    • Hyperlipidemia Father    • Hypertension Father    • Other Father         cardiac Disorder   • No Known Problems Maternal Grandmother    • No Known Problems Maternal Grandfather    • No Known Problems Paternal Grandmother    • No Known Problems Paternal Grandfather    • No Known Problems Sister    • Cancer Maternal Aunt    • Breast cancer Maternal Aunt         age unknown   • No Known Problems Daughter    • No Known Problems Son        SOCIAL HISTORY:  Social History     Socioeconomic History   • Marital status: /Civil Union     Spouse name: Not on file   • Number of children: 2   • Years of education: Not on file   • Highest education level: Not on file   Occupational History   • Occupation: Retired   Tobacco Use   • Smoking status: Former     Types: Cigarettes     Start date:      Quit date:      Years since quittin 0   • Smokeless tobacco: Never   • Tobacco comments:     Quit smoking at 21years of age   Vaping Use   • Vaping Use: Never used   Substance and Sexual Activity   • Alcohol use: Yes     Comment: occasional   • Drug use: No   • Sexual activity: Not Currently     Partners: Male   Other Topics Concern   • Not on file   Social History Narrative    Caffeine use    Graduated from high school    Lives with adult children    Lives with     Denied: History of High risk sexual behavior     Social Determinants of Health     Financial Resource Strain: Not on file   Food Insecurity: Not on file   Transportation Needs: Not on file   Physical Activity: Not on file   Stress: Not on file   Social Connections: Not on file   Intimate Partner Violence: Not on file   Housing Stability: Not on file       Review of Systems   Respiratory: Negative for shortness of breath  Cardiovascular: Negative for chest pain  Gastrointestinal: Negative for abdominal pain  Objective:  Vitals:    12/19/22 1440   BP: 148/90   BP Location: Left arm   Patient Position: Sitting   Cuff Size: Child   Pulse: 77   Resp: 16   Temp: 98 6 °F (37 °C)   TempSrc: Tympanic   SpO2: 98%   Weight: 47 2 kg (104 lb)   Height: 5' 2" (1 575 m)     Body mass index is 19 02 kg/m²  Physical Exam  Vitals and nursing note reviewed  Constitutional:       Appearance: Normal appearance  Cardiovascular:      Rate and Rhythm: Normal rate and regular rhythm  Pulmonary:      Effort: Pulmonary effort is normal       Breath sounds: Normal breath sounds  Neurological:      Mental Status: She is alert             RESULTS:    Recent Results (from the past 1008 hour(s))   CK (with reflex to MB)    Collection Time: 11/17/22 12:18 PM   Result Value Ref Range    Total CK 38 26 - 192 U/L   Comprehensive metabolic panel    Collection Time: 11/17/22 12:18 PM   Result Value Ref Range    Sodium 137 135 - 147 mmol/L    Potassium 4 6 3 5 - 5 3 mmol/L    Chloride 104 96 - 108 mmol/L    CO2 28 21 - 32 mmol/L    ANION GAP 5 4 - 13 mmol/L    BUN 20 5 - 25 mg/dL    Creatinine 0 81 0 60 - 1 30 mg/dL    Glucose 131 65 - 140 mg/dL    Calcium 9 6 8 3 - 10 1 mg/dL    Corrected Calcium 10 2 (H) 8 3 - 10 1 mg/dL AST 20 5 - 45 U/L    ALT 18 12 - 78 U/L    Alkaline Phosphatase 120 (H) 46 - 116 U/L    Total Protein 8 0 6 4 - 8 4 g/dL    Albumin 3 3 (L) 3 5 - 5 0 g/dL    Total Bilirubin 0 34 0 20 - 1 00 mg/dL    eGFR 65 ml/min/1 73sq m       This note was created with voice recognition software  Phonic, grammatical and spelling errors may be present within the note as a result

## 2022-12-19 NOTE — TELEPHONE ENCOUNTER
pt left vm wanting to confirm next appt as she has one date and her  has another date     [phone]    Called pt and confrimed next appt for 6/1 at 10am

## 2023-01-03 ENCOUNTER — TELEPHONE (OUTPATIENT)
Dept: INTERNAL MEDICINE CLINIC | Facility: CLINIC | Age: 88
End: 2023-01-03

## 2023-01-03 ENCOUNTER — CLINICAL SUPPORT (OUTPATIENT)
Dept: INTERNAL MEDICINE CLINIC | Facility: CLINIC | Age: 88
End: 2023-01-03

## 2023-01-03 VITALS — SYSTOLIC BLOOD PRESSURE: 164 MMHG | DIASTOLIC BLOOD PRESSURE: 90 MMHG

## 2023-01-03 DIAGNOSIS — I10 ESSENTIAL HYPERTENSION: Primary | ICD-10-CM

## 2023-01-03 NOTE — TELEPHONE ENCOUNTER
As per Dr Anastasiia Saavedra, "It looks like she needs to go back to the half tablet of losartan twice a day   That seemed to be holding her blood pressure "    Called pt and lmom advising of this

## 2023-01-03 NOTE — TELEPHONE ENCOUNTER
Patient came in for a BP check left arm was 170/80 right arm 164/90      Patient states her readings have been:   141/70  132/62  124/65  128/66  165/77  141/74  162/83  151/66  157/76  144/76  105/64  197/89  168/80  165/80  126/67  150/72

## 2023-01-04 ENCOUNTER — TELEPHONE (OUTPATIENT)
Dept: INTERNAL MEDICINE CLINIC | Facility: CLINIC | Age: 88
End: 2023-01-04

## 2023-01-05 NOTE — TELEPHONE ENCOUNTER
That was stopped because of elevated liver enzymes   Normally we would try and resume but they are heading to [address] can resume and then recheck labs when they return in the Spring

## 2023-01-09 DIAGNOSIS — R41.3 MEMORY DIFFICULTY: Primary | ICD-10-CM

## 2023-01-09 NOTE — TELEPHONE ENCOUNTER
Adam Swan MD  to Me      3:52 PM  He is advised the patient to stop Namenda titration pack if she is not tolerating it and just to continue with Exelon

## 2023-01-09 NOTE — TELEPHONE ENCOUNTER
Received VM transcription from [phone]:    Dr Queenie Owens, this is Nobie Potter  The memory pack is very irritating and it makes me very itchy  Is there anything else I could take or what should I do? Merline Roller, thank you   Bye

## 2023-01-09 NOTE — TELEPHONE ENCOUNTER
Spoke with pt and she states that it is the rivastigmine patches causing irritation and itchiness to her skin  Dr Donte Reid - Pt not taking namenda  Pt currently using patches  She will not take morning patch and will await further instructions  Pt says she is agreeable to try capsule instead  Please advise

## 2023-01-10 NOTE — TELEPHONE ENCOUNTER
January 10, 2023  Shaniqua Chavis MD  to Me      10:08 AM  If she is taking 9 5 mg of Exelon patch please change the prescription to 6 mg of Exelon twice a day and send me the refill request for me to sign off       Thank you

## 2023-01-11 RX ORDER — RIVASTIGMINE TARTRATE 6 MG/1
6 CAPSULE ORAL 2 TIMES DAILY WITH MEALS
Qty: 60 CAPSULE | Refills: 5 | Status: SHIPPED | OUTPATIENT
Start: 2023-01-11

## 2023-01-11 NOTE — TELEPHONE ENCOUNTER
Dr Sheba Palmer - [facility]  Please review and sign if in agreement

## 2023-01-11 NOTE — TELEPHONE ENCOUNTER
Pt left VM re: prescription  Transcribed VM:   This is Sigrid Osler again, I just spoke to the nurse about my prescription and please have it sent to [facility] on [address] , [place], instead of [facility]  Thank you   Bye

## 2023-01-11 NOTE — TELEPHONE ENCOUNTER
Spoke with pt and advised her of Dr Shafer Share recommendation  Pt requesting call back when order is placed  Best cb [phone], ok to leave a detailed message

## 2023-03-28 DIAGNOSIS — R41.3 MEMORY DIFFICULTY: ICD-10-CM

## 2023-03-28 RX ORDER — RIVASTIGMINE 4.6 MG/24H
PATCH, EXTENDED RELEASE TRANSDERMAL
Qty: 90 PATCH | Refills: 3 | Status: SHIPPED | OUTPATIENT
Start: 2023-03-28

## 2023-05-11 ENCOUNTER — TELEPHONE (OUTPATIENT)
Dept: INTERNAL MEDICINE CLINIC | Facility: CLINIC | Age: 88
End: 2023-05-11

## 2023-05-11 NOTE — TELEPHONE ENCOUNTER
Son Mary Peterson called, he claims him and his sister are medical [facility] [facility] for pt     hes trying to take off time from work to focus on her health he says    Not signed off on hippa at this point ignacio [employer] paper to fill out for this   5 days in may ? Can this be done through us or?      Phone  [phone] son

## 2023-05-17 ENCOUNTER — RA CDI HCC (OUTPATIENT)
Dept: OTHER | Facility: HOSPITAL | Age: 88
End: 2023-05-17

## 2023-05-17 NOTE — PROGRESS NOTES
Vick [facility] 75  coding opportunities       Chart reviewed, no opportunity found: CHART REVIEWED, Feliz Guzman [id number]     Patients Insurance     Medicare Insurance: [employer]

## 2023-05-18 ENCOUNTER — OFFICE VISIT (OUTPATIENT)
Dept: INTERNAL MEDICINE CLINIC | Facility: CLINIC | Age: 88
End: 2023-05-18

## 2023-05-18 VITALS
RESPIRATION RATE: 12 BRPM | WEIGHT: 107 LBS | HEIGHT: 62 IN | HEART RATE: 74 BPM | SYSTOLIC BLOOD PRESSURE: 160 MMHG | BODY MASS INDEX: 19.69 KG/M2 | DIASTOLIC BLOOD PRESSURE: 70 MMHG

## 2023-05-18 DIAGNOSIS — I10 ESSENTIAL HYPERTENSION: ICD-10-CM

## 2023-05-18 DIAGNOSIS — G30.1 LATE ONSET ALZHEIMER'S DEMENTIA WITHOUT BEHAVIORAL DISTURBANCE (HCC): Primary | ICD-10-CM

## 2023-05-18 DIAGNOSIS — F02.80 LATE ONSET ALZHEIMER'S DEMENTIA WITHOUT BEHAVIORAL DISTURBANCE (HCC): Primary | ICD-10-CM

## 2023-05-18 DIAGNOSIS — E78.49 OTHER HYPERLIPIDEMIA: ICD-10-CM

## 2023-05-18 RX ORDER — AMLODIPINE BESYLATE 2.5 MG/1
2.5 TABLET ORAL DAILY
Qty: 90 TABLET | Refills: 3 | Status: SHIPPED | OUTPATIENT
Start: 2023-05-18

## 2023-05-18 RX ORDER — LOSARTAN POTASSIUM 50 MG/1
50 TABLET ORAL 2 TIMES DAILY
Qty: 60 TABLET | Refills: 3 | Status: SHIPPED | OUTPATIENT
Start: 2023-05-18

## 2023-05-18 NOTE — PROGRESS NOTES
Assessment/Plan: We will try the amlodipine again but her  is going to try and give her a half dose of the 2 5 mg at night  Ordered labs  Continue other medications  Continue follow-up with neurology  Quality Measures:       Return in about 4 weeks (around 6/15/2023) for Recheck  No problem-specific Assessment & Plan notes found for this encounter  Diagnoses and all orders for this visit:    Late onset Alzheimer's dementia without behavioral disturbance (HCC)    Essential hypertension  -     losartan (COZAAR) 50 mg tablet; Take 1 tablet (50 mg total) by mouth 2 (two) times a day  -     amLODIPine (NORVASC) 2 5 mg tablet; Take 1 tablet (2 5 mg total) by mouth daily  -     CBC and differential; Future  -     Comprehensive metabolic panel; Future    Other hyperlipidemia  -     Lipid panel; Future        Subjective:      Patient ID: Stacey Alexandre is a 80 y o  female  Patient comes in today for follow-up with her   They are back from [place]  He reports that she had no issues while down there  Her dementia is about the same  They do follow routinely with neurology  She is doing better with the patch versus the pills  Blood pressure is again high  She is taking the medicine and feels fine  No side effects with this dosing  It is split up twice a day  But it is high  She has no headache  No visual disturbance  Still off the cholesterol medicine but that needs to be rechecked  No other complaints today  No further additions to her history        ALLERGIES:  Allergies   Allergen Reactions   • Latex    • Molds & Smuts    • Penicillins        CURRENT MEDICATIONS:    Current Outpatient Medications:   •  amLODIPine (NORVASC) 2 5 mg tablet, Take 1 tablet (2 5 mg total) by mouth daily, Disp: 90 tablet, Rfl: 3  •  aspirin (ECOTRIN LOW STRENGTH) 81 mg EC tablet, Take 1 tablet (81 mg total) by mouth daily, Disp: 30 tablet, Rfl: 1  •  losartan (COZAAR) 50 mg tablet, Take 1 tablet (50 mg total) by mouth 2 (two) times a day, Disp: 60 tablet, Rfl: 3  •  rivastigmine (EXELON) 4 6 mg/24 hr TD 24 hr patch, APPLY ONE PATCH TO THE SKIN EVERY MORNING, Disp: 90 patch, Rfl: 3  •  atorvastatin (LIPITOR) 80 mg tablet, Take 1 tablet (80 mg total) by mouth daily (Patient not taking: Reported on 9/13/2022), Disp: 90 tablet, Rfl: 3  •  calcium carbonate (TUMS) 500 mg chewable tablet, Chew 1 tablet daily (Patient not taking: Reported on 11/23/2022), Disp: , Rfl:     Current Facility-Administered Medications:   •  sodium hypochlorite (DAKIN'S HALF-STRENGTH) 0 25 percent topical solution 1 application, 1 application  , Irrigation, Daily, Joan Soto PA-C    ACTIVE PROBLEM LIST:  Patient Active Problem List   Diagnosis   • Allergic rhinitis   • Chronic insomnia   • Hypothyroidism, adult   • TIA (transient ischemic attack)   • Left carotid bruit   • Hyperlipidemia   • Dry skin dermatitis   • Osteoporosis   • Essential hypertension   • Memory loss   • Breast pain, right   • History of TIA (transient ischemic attack)   • Closed left hip fracture (HCC)   • Hyponatremia   • Late onset Alzheimer's dementia without behavioral disturbance (HCC)       PAST MEDICAL HISTORY:  Past Medical History:   Diagnosis Date   • Cognitive decline    • Hyperlipidemia    • Hypertension    • TIA (transient ischemic attack)        PAST SURGICAL HISTORY:  Past Surgical History:   Procedure Laterality Date   • FOOT SURGERY  2007   • HIP SURGERY Left 07/28/2022       FAMILY HISTORY:  Family History   Problem Relation Age of Onset   • Parkinsonism Mother    • Diabetes Father    • Hyperlipidemia Father    • Hypertension Father    • Other Father         cardiac Disorder   • No Known Problems Maternal Grandmother    • No Known Problems Maternal Grandfather    • No Known Problems Paternal Grandmother    • No Known Problems Paternal Grandfather    • No Known Problems Sister    • Cancer Maternal Aunt    • Breast cancer Maternal Aunt         age "unknown   • No Known Problems Daughter    • No Known Problems Son        SOCIAL HISTORY:  Social History     Socioeconomic History   • Marital status: /Civil Union     Spouse name: Not on file   • Number of children: 2   • Years of education: Not on file   • Highest education level: Not on file   Occupational History   • Occupation: Retired   Tobacco Use   • Smoking status: Former     Types: Cigarettes     Start date:      Quit date:      Years since quittin 4   • Smokeless tobacco: Never   • Tobacco comments:     Quit smoking at 21years of age   [de-identified] Use   • Vaping Use: Never used   Substance and Sexual Activity   • Alcohol use: Yes     Comment: occasional   • Drug use: No   • Sexual activity: Not Currently     Partners: Male   Other Topics Concern   • Not on file   Social History Narrative    Caffeine use    Graduated from high school    Lives with adult children    Lives with     Denied: History of High risk sexual behavior     Social Determinants of Health     Financial Resource Strain: Not on file   Food Insecurity: Not on file   Transportation Needs: Not on file   Physical Activity: Not on file   Stress: Not on file   Social Connections: Not on file   Intimate Partner Violence: Not on file   Housing Stability: Not on file       Review of Systems   Respiratory: Negative for shortness of breath  Cardiovascular: Negative for chest pain  Gastrointestinal: Negative for abdominal pain  Objective:  Vitals:    23 0910   BP: 160/70   BP Location: Left arm   Patient Position: Sitting   Pulse: 74   Resp: 12   Weight: 48 5 kg (107 lb)   Height: 5' 2\" (1 575 m)     Body mass index is 19 57 kg/m²  Physical Exam  Vitals and nursing note reviewed  Constitutional:       Appearance: She is well-developed  Cardiovascular:      Rate and Rhythm: Normal rate and regular rhythm  Heart sounds: Normal heart sounds     Pulmonary:      Effort: Pulmonary effort is normal      " Breath sounds: Normal breath sounds  Abdominal:      Palpations: Abdomen is soft  Tenderness: There is no abdominal tenderness  Neurological:      Mental Status: She is alert and oriented to person, place, and time  Psychiatric:         Mood and Affect: Mood normal          Behavior: Behavior normal            RESULTS:    In chart    This note was created with voice recognition software  Phonic, grammatical and spelling errors may be present within the note as a result

## 2023-05-19 ENCOUNTER — APPOINTMENT (OUTPATIENT)
Dept: LAB | Facility: HOSPITAL | Age: 88
End: 2023-05-19

## 2023-05-19 DIAGNOSIS — E78.49 OTHER HYPERLIPIDEMIA: ICD-10-CM

## 2023-05-19 DIAGNOSIS — I10 ESSENTIAL HYPERTENSION: ICD-10-CM

## 2023-05-19 LAB
ALBUMIN SERPL BCP-MCNC: 4.4 G/DL (ref 3.5–5)
ALP SERPL-CCNC: 79 U/L (ref 34–104)
ALT SERPL W P-5'-P-CCNC: 9 U/L (ref 7–52)
ANION GAP SERPL CALCULATED.3IONS-SCNC: 6 MMOL/L (ref 4–13)
AST SERPL W P-5'-P-CCNC: 18 U/L (ref 13–39)
BASOPHILS # BLD AUTO: 0.08 THOUSANDS/ÂΜL (ref 0–0.1)
BASOPHILS NFR BLD AUTO: 1 % (ref 0–1)
BILIRUB SERPL-MCNC: 0.49 MG/DL (ref 0.2–1)
BUN SERPL-MCNC: 31 MG/DL (ref 5–25)
CALCIUM SERPL-MCNC: 9.7 MG/DL (ref 8.4–10.2)
CHLORIDE SERPL-SCNC: 100 MMOL/L (ref 96–108)
CHOLEST SERPL-MCNC: 326 MG/DL
CO2 SERPL-SCNC: 28 MMOL/L (ref 21–32)
CREAT SERPL-MCNC: 0.97 MG/DL (ref 0.6–1.3)
EOSINOPHIL # BLD AUTO: 0.31 THOUSAND/ÂΜL (ref 0–0.61)
EOSINOPHIL NFR BLD AUTO: 3 % (ref 0–6)
ERYTHROCYTE [DISTWIDTH] IN BLOOD BY AUTOMATED COUNT: 13.1 % (ref 11.6–15.1)
GFR SERPL CREATININE-BSD FRML MDRD: 51 ML/MIN/1.73SQ M
GLUCOSE P FAST SERPL-MCNC: 95 MG/DL (ref 65–99)
HCT VFR BLD AUTO: 35.1 % (ref 34.8–46.1)
HDLC SERPL-MCNC: 95 MG/DL
HGB BLD-MCNC: 11.3 G/DL (ref 11.5–15.4)
IMM GRANULOCYTES # BLD AUTO: 0.03 THOUSAND/UL (ref 0–0.2)
IMM GRANULOCYTES NFR BLD AUTO: 0 % (ref 0–2)
LDLC SERPL CALC-MCNC: 216 MG/DL (ref 0–100)
LYMPHOCYTES # BLD AUTO: 2.49 THOUSANDS/ÂΜL (ref 0.6–4.47)
LYMPHOCYTES NFR BLD AUTO: 26 % (ref 14–44)
MCH RBC QN AUTO: 30.3 PG (ref 26.8–34.3)
MCHC RBC AUTO-ENTMCNC: 32.2 G/DL (ref 31.4–37.4)
MCV RBC AUTO: 94 FL (ref 82–98)
MONOCYTES # BLD AUTO: 0.69 THOUSAND/ÂΜL (ref 0.17–1.22)
MONOCYTES NFR BLD AUTO: 7 % (ref 4–12)
NEUTROPHILS # BLD AUTO: 6.04 THOUSANDS/ÂΜL (ref 1.85–7.62)
NEUTS SEG NFR BLD AUTO: 63 % (ref 43–75)
NONHDLC SERPL-MCNC: 231 MG/DL
NRBC BLD AUTO-RTO: 0 /100 WBCS
PLATELET # BLD AUTO: 224 THOUSANDS/UL (ref 149–390)
PMV BLD AUTO: 11.6 FL (ref 8.9–12.7)
POTASSIUM SERPL-SCNC: 4.3 MMOL/L (ref 3.5–5.3)
PROT SERPL-MCNC: 7 G/DL (ref 6.4–8.4)
RBC # BLD AUTO: 3.73 MILLION/UL (ref 3.81–5.12)
SODIUM SERPL-SCNC: 134 MMOL/L (ref 135–147)
TRIGL SERPL-MCNC: 74 MG/DL
WBC # BLD AUTO: 9.64 THOUSAND/UL (ref 4.31–10.16)

## 2023-06-01 ENCOUNTER — OFFICE VISIT (OUTPATIENT)
Dept: NEUROLOGY | Facility: CLINIC | Age: 88
End: 2023-06-01

## 2023-06-01 VITALS
BODY MASS INDEX: 18.22 KG/M2 | OXYGEN SATURATION: 99 % | WEIGHT: 99 LBS | HEIGHT: 62 IN | DIASTOLIC BLOOD PRESSURE: 80 MMHG | HEART RATE: 72 BPM | SYSTOLIC BLOOD PRESSURE: 140 MMHG

## 2023-06-01 DIAGNOSIS — G30.1 LATE ONSET ALZHEIMER'S DEMENTIA WITHOUT BEHAVIORAL DISTURBANCE (HCC): Primary | ICD-10-CM

## 2023-06-01 DIAGNOSIS — F02.80 LATE ONSET ALZHEIMER'S DEMENTIA WITHOUT BEHAVIORAL DISTURBANCE (HCC): Primary | ICD-10-CM

## 2023-06-01 RX ORDER — DONEPEZIL HYDROCHLORIDE 10 MG/1
10 TABLET, FILM COATED ORAL
Qty: 30 TABLET | Refills: 5 | Status: SHIPPED | OUTPATIENT
Start: 2023-06-01

## 2023-06-01 RX ORDER — DONEPEZIL HYDROCHLORIDE 5 MG/1
5 TABLET, FILM COATED ORAL
Qty: 30 TABLET | Refills: 0 | Status: SHIPPED | OUTPATIENT
Start: 2023-06-01

## 2023-06-01 NOTE — PROGRESS NOTES
Scout Vargas is a 80 y o  female  Chief Complaint   Patient presents with   • Late onset Alzheimer's dementia without behavioral disturban       Assessment:  1  Late onset Alzheimer's dementia without behavioral disturbance (HCC)        Plan:  Discontinue Exelon patch  Start Aricept 5 mg once a day for 1 month and then increasing it to 10 mg once a day  Continue with mentally stimulating exercises  Stroke education given to the patient  To be under constant supervision  Follow-up in 6 months    Discussion:  Patient has mild Alzheimer's dementia her MoCA is 20/30, last time [place] was 21/30, she did not tolerate the oral Exelon pills and she is having some itching on the current Exelon patch and we discussed about other options she is agreeable to discontinue Exelon patch and try Aricept 5 mg a day for 1 month and then increasing it to 10 mg a day if no side effects if she experiences any side effects on Aricept then she would like to go back on the Exelon patch or Namenda  She was advised mentally stimulating exercises to keep her blood pressure cholesterol and sugar under control to be under constant supervision to go to the hospital if has any worsening symptoms and call me otherwise to see me back in 6 months and follow-up with her other physicians    Subjective:    HPI   Patient is here in follow-up for her memory difficulty, since her last visit she tells me she is doing good, she continues to do her ADLs she is able to cook she does not drive she complains of a slight rash with Exelon patch at 4 6 mg she has not tolerated the higher dose and has not tolerated oral pills her sleep is about 5 hours a night but no hallucinations no difficulty in swallowing she has not had any falls she ambulates with a cane no bowel and bladder incontinence, she lives with her  and has not had any more syncopal episodes no other complaints      Vitals:    06/01/23 0915   BP: 140/80   BP Location: Right arm "  Patient Position: Sitting   Cuff Size: Standard   Pulse: 72   SpO2: 99%   Weight: 44 9 kg (99 lb)   Height: 5' 2\" (1 575 m)       Current Medications    Current Outpatient Medications:   •  amLODIPine (NORVASC) 2 5 mg tablet, Take 1 tablet (2 5 mg total) by mouth daily, Disp: 90 tablet, Rfl: 3  •  aspirin (ECOTRIN LOW STRENGTH) 81 mg EC tablet, Take 1 tablet (81 mg total) by mouth daily, Disp: 30 tablet, Rfl: 1  •  Cyanocobalamin (B-12 PO), Take 1 tablet by mouth in the morning, Disp: , Rfl:   •  losartan (COZAAR) 50 mg tablet, Take 1 tablet (50 mg total) by mouth 2 (two) times a day, Disp: 60 tablet, Rfl: 3  •  rivastigmine (EXELON) 4 6 mg/24 hr TD 24 hr patch, APPLY ONE PATCH TO THE SKIN EVERY MORNING, Disp: 90 patch, Rfl: 3  •  Turmeric (CURCUMIN 95 PO), Take 1 tablet by mouth in the morning, Disp: , Rfl:   •  atorvastatin (LIPITOR) 80 mg tablet, Take 1 tablet (80 mg total) by mouth daily, Disp: 90 tablet, Rfl: 3  •  calcium carbonate (TUMS) 500 mg chewable tablet, Chew 1 tablet daily, Disp: , Rfl:     Current Facility-Administered Medications:   •  sodium hypochlorite (DAKIN'S HALF-STRENGTH) 0 25 percent topical solution 1 application, 1 application  , Irrigation, Daily, Brigido Blowers, PA-C      Allergies  Latex, Molds & smuts, and Penicillins    Past Medical History  Past Medical History:   Diagnosis Date   • Cognitive decline    • Hyperlipidemia    • Hypertension    • TIA (transient ischemic attack)          Past Surgical History:  Past Surgical History:   Procedure Laterality Date   • FOOT SURGERY  2007   • HIP SURGERY Left 07/28/2022         Family History:  Family History   Problem Relation Age of Onset   • Parkinsonism Mother    • Diabetes Father    • Hyperlipidemia Father    • Hypertension Father    • Other Father         cardiac Disorder   • No Known Problems Maternal Grandmother    • No Known Problems Maternal Grandfather    • No Known Problems Paternal Grandmother    • No Known Problems Paternal " Grandfather    • No Known Problems Sister    • Cancer Maternal Aunt    • Breast cancer Maternal Aunt         age unknown   • No Known Problems Daughter    • No Known Problems Son        Social History:   reports that she quit smoking about 66 years ago  Her smoking use included cigarettes  She started smoking about 71 years ago  She has never used smokeless tobacco  She reports current alcohol use  She reports that she does not use drugs  I have reviewed the past medical history, surgical history, social and family history, current medications, allergies vitals, review of systems, and updated this information as appropriate today  Objective:    Physical Exam    Neurological Exam     GENERAL:  Cooperative in no acute distress  Well-developed and well-nourished     HEAD and NECK   Head is atraumatic normocephalic with no lesions or masses  Neck is supple with full range of motion     CARDIOVASCULAR  Carotid Arteries-no carotid bruits  NEUROLOGIC:  Mental Status-the patient is awake alert and oriented without aphasia or apraxia, MoCA is 20/30  Cranial Nerves: Visual fields are full to confrontation  Extraocular movements are full without nystagmus  Pupils are 2-1/2 mm and reactive  Face is symmetrical to light touch  Movements of facial expression move symmetrically  Hearing is normal to finger rub bilaterally  Soft palate lifts symmetrically  Shoulder shrug is symmetrical  Tongue is midline without atrophy  Motor: No drift is noted on arm extension  Strength is full in the upper and lower extremities with normal bulk and tone  Ambulates with a cane  ROS:  Review of Systems   Constitutional: Negative  Negative for appetite change and fever  HENT: Negative  Negative for hearing loss, tinnitus, trouble swallowing and voice change  Eyes: Negative  Negative for photophobia, pain and visual disturbance  Respiratory: Negative  Negative for shortness of breath  Cardiovascular: Negative    Negative for palpitations  Gastrointestinal: Negative  Negative for nausea and vomiting  Endocrine: Negative  Negative for cold intolerance  Genitourinary: Negative  Negative for dysuria, frequency and urgency  Musculoskeletal: Negative  Negative for gait problem, myalgias and neck pain  Skin: Negative  Negative for rash  Allergic/Immunologic: Negative  Neurological: Negative  Negative for dizziness, tremors, seizures, syncope, facial asymmetry, speech difficulty, weakness, light-headedness, numbness and headaches  Hematological: Negative  Does not bruise/bleed easily  Psychiatric/Behavioral: Positive for confusion  Negative for hallucinations and sleep disturbance

## 2023-06-20 ENCOUNTER — RA CDI HCC (OUTPATIENT)
Dept: OTHER | Facility: HOSPITAL | Age: 88
End: 2023-06-20

## 2023-06-21 NOTE — PROGRESS NOTES
[facility] 75  coding opportunities     Jaki L89 150- per claim from [facility] and [place] 8/17/22    Did not suggest D69 6- lab results within normal range     Chart Reviewed number of suggestions sent to Provider: 1   GR    Patients Insurance     Medicare Insurance: [address] [place] [address]

## 2023-07-27 DIAGNOSIS — F02.80 LATE ONSET ALZHEIMER'S DEMENTIA WITHOUT BEHAVIORAL DISTURBANCE (HCC): ICD-10-CM

## 2023-07-27 DIAGNOSIS — G30.1 LATE ONSET ALZHEIMER'S DEMENTIA WITHOUT BEHAVIORAL DISTURBANCE (HCC): ICD-10-CM

## 2023-07-27 RX ORDER — DONEPEZIL HYDROCHLORIDE 5 MG/1
5 TABLET, FILM COATED ORAL
Qty: 30 TABLET | Refills: 0 | OUTPATIENT
Start: 2023-07-27

## 2023-07-27 NOTE — TELEPHONE ENCOUNTER
Patient is on 10mg dose. Pharmacy will cancel prescription for 5mg dose.  Please refuse refill request.

## 2023-07-31 NOTE — TELEPHONE ENCOUNTER
Pt's  left a VM requesting refill of Aricept 5 mg as he states that she cannot tolerate the 10 mg dosage. Transcribed VM:   I'm calling about my wife Zack Barker. She needs a prescription refill for Donepezil, but she needs 5 mg because the 10 mg did not agree with her. So could you please send it to a [facility] on [address] [place] [address] in [place]. Thank you. Called back and spoke with pt's  and he states that pt cannot take the 10 mg dose of Aricept as it causes her "Major diarrhea."    Please sign off if agreeable. Thank you.

## 2023-08-01 RX ORDER — DONEPEZIL HYDROCHLORIDE 5 MG/1
5 TABLET, FILM COATED ORAL
Qty: 30 TABLET | Refills: 11 | Status: SHIPPED | OUTPATIENT
Start: 2023-08-01

## 2023-08-25 ENCOUNTER — OFFICE VISIT (OUTPATIENT)
Dept: INTERNAL MEDICINE CLINIC | Facility: CLINIC | Age: 88
End: 2023-08-25
Payer: COMMERCIAL

## 2023-08-25 VITALS
TEMPERATURE: 98.3 F | HEIGHT: 62 IN | DIASTOLIC BLOOD PRESSURE: 84 MMHG | BODY MASS INDEX: 20.06 KG/M2 | SYSTOLIC BLOOD PRESSURE: 152 MMHG | WEIGHT: 109 LBS | HEART RATE: 63 BPM | RESPIRATION RATE: 16 BRPM | OXYGEN SATURATION: 99 %

## 2023-08-25 DIAGNOSIS — I10 ESSENTIAL HYPERTENSION: Primary | ICD-10-CM

## 2023-08-25 DIAGNOSIS — E78.2 MIXED HYPERLIPIDEMIA: ICD-10-CM

## 2023-08-25 DIAGNOSIS — G30.1 LATE ONSET ALZHEIMER'S DEMENTIA WITHOUT BEHAVIORAL DISTURBANCE (HCC): ICD-10-CM

## 2023-08-25 DIAGNOSIS — F02.80 LATE ONSET ALZHEIMER'S DEMENTIA WITHOUT BEHAVIORAL DISTURBANCE (HCC): ICD-10-CM

## 2023-08-25 PROCEDURE — 99214 OFFICE O/P EST MOD 30 MIN: CPT | Performed by: INTERNAL MEDICINE

## 2023-08-25 RX ORDER — ATORVASTATIN CALCIUM 80 MG/1
80 TABLET, FILM COATED ORAL DAILY
Qty: 90 TABLET | Refills: 3 | Status: SHIPPED | OUTPATIENT
Start: 2023-08-25

## 2023-08-25 RX ORDER — AMLODIPINE BESYLATE 5 MG/1
5 TABLET ORAL DAILY
Qty: 90 TABLET | Refills: 3 | Status: SHIPPED | OUTPATIENT
Start: 2023-08-25

## 2023-08-25 NOTE — PROGRESS NOTES
Assessment/Plan: We will increase her amlodipine a little more to 5 mg daily. For her hyperlipidemia, given her recent lab numbers, recommended she resume the atorvastatin 80 mg daily. Because of the side effect of insomnia, suggested stopping the Aricept for a week and see if that is the problem. Ordered labs for next visit. Quality Measures:       Return in about 3 months (around 11/25/2023) for Regular visit and Medicare Wellness. No problem-specific Assessment & Plan notes found for this encounter. Diagnoses and all orders for this visit:    Essential hypertension  -     amLODIPine (NORVASC) 5 mg tablet; Take 1 tablet (5 mg total) by mouth daily    Mixed hyperlipidemia  -     atorvastatin (LIPITOR) 80 mg tablet; Take 1 tablet (80 mg total) by mouth daily  -     CBC and differential; Future  -     Comprehensive metabolic panel; Future  -     Lipid panel; Future    Late onset Alzheimer's dementia without behavioral disturbance (HCC)          Subjective:      Patient ID: Sharmin Millan is a 80 y.o. female. Patient comes in today for routine follow-up with her . Her blood pressure is still running a little high at home. She is tolerating the amlodipine but her  thinks he is giving her half tablet twice a day. It is definitely a total dose of 2.5 mg daily. Her blood work shows cholesterol is definitely not controlled and she is not taking the statin. Since her last visit she did see neurology again and was switched from Exelon to Aricept. But they think it is giving her trouble sleeping. She definitely did not have the problem before that medicine. Her  tried switching around the timing but does not seem to be helping. No other complaints today. No further additions to her history.       ALLERGIES:  Allergies   Allergen Reactions   • Latex    • Molds & Smuts    • Penicillins        CURRENT MEDICATIONS:    Current Outpatient Medications:   •  amLODIPine (NORVASC) 5 mg tablet, Take 1 tablet (5 mg total) by mouth daily, Disp: 90 tablet, Rfl: 3  •  aspirin (ECOTRIN LOW STRENGTH) 81 mg EC tablet, Take 1 tablet (81 mg total) by mouth daily, Disp: 30 tablet, Rfl: 1  •  atorvastatin (LIPITOR) 80 mg tablet, Take 1 tablet (80 mg total) by mouth daily, Disp: 90 tablet, Rfl: 3  •  Cyanocobalamin (B-12 PO), Take 1 tablet by mouth in the morning, Disp: , Rfl:   •  donepezil (ARICEPT) 5 mg tablet, Take 1 tablet (5 mg total) by mouth daily at bedtime, Disp: 30 tablet, Rfl: 11  •  losartan (COZAAR) 50 mg tablet, Take 1 tablet (50 mg total) by mouth 2 (two) times a day, Disp: 60 tablet, Rfl: 3  •  Turmeric (CURCUMIN 95 PO), Take 1 tablet by mouth in the morning, Disp: , Rfl:   •  calcium carbonate (TUMS) 500 mg chewable tablet, Chew 1 tablet daily, Disp: , Rfl:   •  donepezil (Aricept) 10 mg tablet, Take 1 tablet (10 mg total) by mouth daily at bedtime, Disp: 30 tablet, Rfl: 5    Current Facility-Administered Medications:   •  sodium hypochlorite (DAKIN'S HALF-STRENGTH) 0.25 percent topical solution 1 application, 1 application. , Irrigation, Daily, Mateo Rico PA-C    ACTIVE PROBLEM LIST:  Patient Active Problem List   Diagnosis   • Allergic rhinitis   • Chronic insomnia   • Hypothyroidism, adult   • TIA (transient ischemic attack)   • Left carotid bruit   • Hyperlipidemia   • Dry skin dermatitis   • Osteoporosis   • Essential hypertension   • Memory loss   • Breast pain, right   • History of TIA (transient ischemic attack)   • Closed left hip fracture (HCC)   • Hyponatremia   • Late onset Alzheimer's dementia without behavioral disturbance ([address])       PAST MEDICAL HISTORY:  Past Medical History:   Diagnosis Date   • Cognitive decline    • Hyperlipidemia    • Hypertension    • TIA (transient ischemic attack)        PAST SURGICAL HISTORY:  Past Surgical History:   Procedure Laterality Date   • FOOT SURGERY  2007   • HIP SURGERY Left 07/28/2022       FAMILY HISTORY:  Family History   Problem Relation Age of Onset   • Parkinsonism Mother    • Diabetes Father    • Hyperlipidemia Father    • Hypertension Father    • Other Father         cardiac Disorder   • No Known Problems Maternal Grandmother    • No Known Problems Maternal Grandfather    • No Known Problems Paternal Grandmother    • No Known Problems Paternal Grandfather    • No Known Problems Sister    • Cancer Maternal Aunt    • Breast cancer Maternal Aunt         age unknown   • No Known Problems Daughter    • No Known Problems Son        SOCIAL HISTORY:  Social History     Socioeconomic History   • Marital status: /Civil Union     Spouse name: Not on file   • Number of children: 2   • Years of education: Not on file   • Highest education level: Not on file   Occupational History   • Occupation: Retired   Tobacco Use   • Smoking status: Former     Types: Cigarettes     Start date:      Quit date:      Years since quittin.6   • Smokeless tobacco: Never   • Tobacco comments:     Quit smoking at 21years of age   Vaping Use   • Vaping Use: Never used   Substance and Sexual Activity   • Alcohol use: Yes     Comment: occasional   • Drug use: No   • Sexual activity: Not Currently     Partners: Male   Other Topics Concern   • Not on file   Social History Narrative    Caffeine use    Graduated from high school    Lives with adult children    Lives with     Denied: History of High risk sexual behavior     Social Determinants of Health     Financial Resource Strain: Not on file   Food Insecurity: Not on file   Transportation Needs: Not on file   Physical Activity: Not on file   Stress: Not on file   Social Connections: Not on file   Intimate Partner Violence: Not on file   Housing Stability: Not on file       Review of Systems   Respiratory: Negative for shortness of breath. Cardiovascular: Negative for chest pain. Gastrointestinal: Negative for abdominal pain.          Objective:  Vitals:    23 1550   BP: 152/84   BP Location: Left arm   Patient Position: Sitting   Cuff Size: Standard   Pulse: 63   Resp: 16   Temp: 98.3 °F (36.8 °C)   TempSrc: Tympanic   SpO2: 99%   Weight: 49.4 kg (109 lb)   Height: 5' 2" (1.575 m)     Body mass index is 19.94 kg/m². Physical Exam  Vitals and nursing note reviewed. Constitutional:       Appearance: She is well-developed. Cardiovascular:      Rate and Rhythm: Normal rate and regular rhythm. Heart sounds: Normal heart sounds. Pulmonary:      Effort: Pulmonary effort is normal.      Breath sounds: Normal breath sounds. Abdominal:      Palpations: Abdomen is soft. Tenderness: There is no abdominal tenderness. Neurological:      Mental Status: She is alert and oriented to person, place, and time. RESULTS:  Cholesterol   Date/Time Value Ref Range Status   05/19/2023 08:29  (H) See Comment mg/dL Final     Comment:     Cholesterol:         Pediatric <18 Years        Desirable          <170 mg/dL      Borderline High    170-199 mg/dL      High               >=200 mg/dL        Adult >=18 Years            Desirable        <200 mg/dL      Borderline High  200-239 mg/dL      High             >239 mg/dL       Triglycerides   Date/Time Value Ref Range Status   05/19/2023 08:29 AM 74 See Comment mg/dL Final     Comment:     Triglyceride:     0-9Y            <75mg/dL     10Y-17Y         <90 mg/dL       >=18Y     Normal          <150 mg/dL     Borderline High 150-199 mg/dL     High            200-499 mg/dL        Very High       >499 mg/dL    Specimen collection should occur prior to N-Acetylcysteine or Metamizole administration due to the potential for falsely depressed results.      HDL, Direct   Date/Time Value Ref Range Status   05/19/2023 08:29 AM 95 >=50 mg/dL Final     LDL Calculated   Date/Time Value Ref Range Status   05/19/2023 08:29  (H) 0 - 100 mg/dL Final     Comment:     LDL Cholesterol:     Optimal           <100 mg/dl     Near Optimal      100-129 mg/dl     Above Optimal       Borderline High 130-159 mg/dl       High            160-189 mg/dl       Very High       >189 mg/dl         This screening LDL is a calculated result. It does not have the accuracy of the Direct Measured LDL in the monitoring of patients with hyperlipidemia and/or statin therapy. Direct Measure LDL (NWI200) must be ordered separately in these patients. Hemoglobin   Date/Time Value Ref Range Status   05/19/2023 08:29 AM 11.3 (L) 11.5 - 15.4 g/dL Final     Hematocrit   Date/Time Value Ref Range Status   05/19/2023 08:29 AM 35.1 34.8 - 46.1 % Final     Platelets   Date/Time Value Ref Range Status   05/19/2023 08:29  149 - 390 Thousands/uL Final     TSH 3RD GENERATON   Date/Time Value Ref Range Status   09/16/2022 08:23 AM 3.619 0.450 - 4.500 uIU/mL Final     Comment:     The recommended reference ranges for TSH during pregnancy are as follows:   First trimester 0.1 to 2.5 uIU/mL   Second trimester  0.2 to 3.0 uIU/mL   Third trimester 0.3 to 3.0 uIU/m    Note: Normal ranges may not apply to patients who are transgender, non-binary, or whose legal sex, sex at birth, and gender identity differ. Adult TSH (3rd generation) reference range follows the recommended guidelines of the American Thyroid Association, January, 2020. Free T4   Date/Time Value Ref Range Status   09/16/2022 08:23 AM 1.07 0.76 - 1.46 ng/dL Final     Comment:     Specimen collection should occur prior to Sulfasalazine administration due to the potential for falsely elevated results. Sodium   Date/Time Value Ref Range Status   05/19/2023 08:29  (L) 135 - 147 mmol/L Final     BUN   Date/Time Value Ref Range Status   05/19/2023 08:29 AM 31 (H) 5 - 25 mg/dL Final     Creatinine   Date/Time Value Ref Range Status   05/19/2023 08:29 AM 0.97 0.60 - 1.30 mg/dL Final     Comment:     Standardized to IDMS reference method      In chart    This note was created with voice recognition software.   Phonic, grammatical and spelling errors may be present within the note as a result.

## 2023-09-13 ENCOUNTER — TELEPHONE (OUTPATIENT)
Dept: INTERNAL MEDICINE CLINIC | Facility: CLINIC | Age: 88
End: 2023-09-13

## 2023-09-13 NOTE — TELEPHONE ENCOUNTER
Took her bp as 127/66  bp    This morn . .. He thinks this is low if we can say if that's ok or not?       Jay Jay Waddell

## 2023-09-13 NOTE — TELEPHONE ENCOUNTER
Spoke to Maci, patients , per Dr. Shawn Farias, her pressure is ok. He understood and will keep an eye on it and keep us updated.

## 2023-10-06 ENCOUNTER — OFFICE VISIT (OUTPATIENT)
Dept: INTERNAL MEDICINE CLINIC | Facility: CLINIC | Age: 88
End: 2023-10-06
Payer: COMMERCIAL

## 2023-10-06 VITALS
SYSTOLIC BLOOD PRESSURE: 120 MMHG | DIASTOLIC BLOOD PRESSURE: 60 MMHG | WEIGHT: 109.2 LBS | HEART RATE: 90 BPM | HEIGHT: 62 IN | OXYGEN SATURATION: 99 % | BODY MASS INDEX: 20.09 KG/M2

## 2023-10-06 DIAGNOSIS — F51.04 CHRONIC INSOMNIA: ICD-10-CM

## 2023-10-06 DIAGNOSIS — I10 ESSENTIAL HYPERTENSION: Primary | ICD-10-CM

## 2023-10-06 DIAGNOSIS — E78.49 OTHER HYPERLIPIDEMIA: ICD-10-CM

## 2023-10-06 PROCEDURE — 99214 OFFICE O/P EST MOD 30 MIN: CPT | Performed by: INTERNAL MEDICINE

## 2023-10-06 RX ORDER — TRAZODONE HYDROCHLORIDE 50 MG/1
50 TABLET ORAL
Qty: 90 TABLET | Refills: 1 | Status: SHIPPED | OUTPATIENT
Start: 2023-10-06

## 2023-10-06 RX ORDER — ROSUVASTATIN CALCIUM 5 MG/1
5 TABLET, COATED ORAL DAILY
Qty: 90 TABLET | Refills: 3 | Status: SHIPPED | OUTPATIENT
Start: 2023-10-06

## 2023-10-06 RX ORDER — AMLODIPINE BESYLATE 2.5 MG/1
2.5 TABLET ORAL 2 TIMES DAILY
Qty: 180 TABLET | Refills: 1 | Status: SHIPPED | OUTPATIENT
Start: 2023-10-06

## 2023-10-06 NOTE — PROGRESS NOTES
Assessment/Plan:     The variability in her blood pressure has been an ongoing issue. We will try and split up the amlodipine just like the losartan and see if that works better. Since her  thought she was having side effects from the atorvastatin like in [place], we will switch her to low-dose Crestor and recheck labs in 6 weeks. Not sure if we will get her numbers perfect but it may pull it down somewhat. For her sleeping, options are limited. We will try trazodone. But some of this may be her dementia. Quality Measures:       Return in about 4 weeks (around 11/3/2023) for Recheck. No problem-specific Assessment & Plan notes found for this encounter. Diagnoses and all orders for this visit:    Essential hypertension  -     amLODIPine (NORVASC) 2.5 mg tablet; Take 1 tablet (2.5 mg total) by mouth 2 (two) times a day  -     Comprehensive metabolic panel; Future  -     Lipid panel; Future    Chronic insomnia  -     traZODone (DESYREL) 50 mg tablet; Take 1 tablet (50 mg total) by mouth daily at bedtime    Other hyperlipidemia  -     rosuvastatin (CRESTOR) 5 mg tablet; Take 1 tablet (5 mg total) by mouth daily          Subjective:      Patient ID: Nanci Whitaker is a 80 y.o. female. Patient comes in today for follow-up with her  with several issues. They state her blood pressure is bouncing around again. It is normal here on multiple readings. She is taking her medicine as directed. Her  states he will sometimes go up to 196 systolic. But she does not feel anything. No symptoms whatsoever. She also fell down or passed out earlier this week. Her  notes that this happened to her in [place] and it was after her atorvastatin was restarted and increased. He had forgotten about that. Recently he started her back on the atorvastatin because of the cholesterol numbers. She also received her flu and COVID shot this week as well.   They also reports she is having trouble sleeping. This is an ongoing issue. Variable. Some nights are good. Others or not. ALLERGIES:  Allergies   Allergen Reactions   • Latex    • Molds & Smuts    • Penicillins        CURRENT MEDICATIONS:    Current Outpatient Medications:   •  amLODIPine (NORVASC) 2.5 mg tablet, Take 1 tablet (2.5 mg total) by mouth 2 (two) times a day, Disp: 180 tablet, Rfl: 1  •  aspirin (ECOTRIN LOW STRENGTH) 81 mg EC tablet, Take 1 tablet (81 mg total) by mouth daily, Disp: 30 tablet, Rfl: 1  •  Cyanocobalamin (B-12 PO), Take 1 tablet by mouth in the morning, Disp: , Rfl:   •  donepezil (ARICEPT) 5 mg tablet, Take 1 tablet (5 mg total) by mouth daily at bedtime, Disp: 30 tablet, Rfl: 11  •  losartan (COZAAR) 50 mg tablet, Take 1 tablet (50 mg total) by mouth 2 (two) times a day, Disp: 60 tablet, Rfl: 3  •  rosuvastatin (CRESTOR) 5 mg tablet, Take 1 tablet (5 mg total) by mouth daily, Disp: 90 tablet, Rfl: 3  •  traZODone (DESYREL) 50 mg tablet, Take 1 tablet (50 mg total) by mouth daily at bedtime, Disp: 90 tablet, Rfl: 1  •  Turmeric (CURCUMIN 95 PO), Take 1 tablet by mouth in the morning, Disp: , Rfl:     Current Facility-Administered Medications:   •  sodium hypochlorite (DAKIN'S HALF-STRENGTH) 0.25 percent topical solution 1 application, 1 application. , Irrigation, Daily, Rey Cooney PA-C    ACTIVE PROBLEM LIST:  Patient Active Problem List   Diagnosis   • Allergic rhinitis   • Chronic insomnia   • Hypothyroidism, adult   • TIA (transient ischemic attack)   • Left carotid bruit   • Hyperlipidemia   • Dry skin dermatitis   • Osteoporosis   • Essential hypertension   • Memory loss   • Breast pain, right   • History of TIA (transient ischemic attack)   • Closed left hip fracture (HCC)   • Hyponatremia   • Late onset Alzheimer's dementia without behavioral disturbance ([address])       PAST MEDICAL HISTORY:  Past Medical History:   Diagnosis Date   • Cognitive decline    • Hyperlipidemia    • Hypertension    • TIA (transient ischemic attack)        PAST SURGICAL HISTORY:  Past Surgical History:   Procedure Laterality Date   • FOOT SURGERY  2007   • HIP SURGERY Left 2022       FAMILY HISTORY:  Family History   Problem Relation Age of Onset   • Parkinsonism Mother    • Diabetes Father    • Hyperlipidemia Father    • Hypertension Father    • Other Father         cardiac Disorder   • No Known Problems Maternal Grandmother    • No Known Problems Maternal Grandfather    • No Known Problems Paternal Grandmother    • No Known Problems Paternal Grandfather    • No Known Problems Sister    • Cancer Maternal Aunt    • Breast cancer Maternal Aunt         age unknown   • No Known Problems Daughter    • No Known Problems Son        SOCIAL HISTORY:  Social History     Socioeconomic History   • Marital status: /Civil Union     Spouse name: Not on file   • Number of children: 2   • Years of education: Not on file   • Highest education level: Not on file   Occupational History   • Occupation: Retired   Tobacco Use   • Smoking status: Former     Types: Cigarettes     Start date:      Quit date:      Years since quittin.8   • Smokeless tobacco: Never   • Tobacco comments:     Quit smoking at 21years of age   Vaping Use   • Vaping Use: Never used   Substance and Sexual Activity   • Alcohol use: Yes     Comment: occasional   • Drug use: No   • Sexual activity: Not Currently     Partners: Male   Other Topics Concern   • Not on file   Social History Narrative    Caffeine use    Graduated from high school    Lives with adult children    Lives with     Denied: History of High risk sexual behavior     Social Determinants of Health     Financial Resource Strain: Not on file   Food Insecurity: Not on file   Transportation Needs: Not on file   Physical Activity: Not on file   Stress: Not on file   Social Connections: Not on file   Intimate Partner Violence: Not on file   Housing Stability: Not on file       Review of Systems   Respiratory: Negative for shortness of breath. Cardiovascular: Negative for chest pain. Gastrointestinal: Negative for abdominal pain. Objective:  Vitals:    10/06/23 0759   BP: 120/60   BP Location: Left arm   Patient Position: Sitting   Cuff Size: Child   Pulse: 90   SpO2: 99%   Weight: 49.5 kg (109 lb 3.2 oz)   Height: 5' 2" (1.575 m)     Body mass index is 19.97 kg/m². Physical Exam  Vitals and nursing note reviewed. Constitutional:       Appearance: Normal appearance. Cardiovascular:      Rate and Rhythm: Normal rate and regular rhythm. Pulmonary:      Effort: Pulmonary effort is normal. No respiratory distress. Neurological:      Mental Status: She is alert. RESULTS:  Cholesterol   Date/Time Value Ref Range Status   05/19/2023 08:29  (H) See Comment mg/dL Final     Comment:     Cholesterol:         Pediatric <18 Years        Desirable          <170 mg/dL      Borderline High    170-199 mg/dL      High               >=200 mg/dL        Adult >=18 Years            Desirable        <200 mg/dL      Borderline High  200-239 mg/dL      High             >239 mg/dL       Triglycerides   Date/Time Value Ref Range Status   05/19/2023 08:29 AM 74 See Comment mg/dL Final     Comment:     Triglyceride:     0-9Y            <75mg/dL     10Y-17Y         <90 mg/dL       >=18Y     Normal          <150 mg/dL     Borderline High 150-199 mg/dL     High            200-499 mg/dL        Very High       >499 mg/dL    Specimen collection should occur prior to N-Acetylcysteine or Metamizole administration due to the potential for falsely depressed results.      HDL, Direct   Date/Time Value Ref Range Status   05/19/2023 08:29 AM 95 >=50 mg/dL Final     LDL Calculated   Date/Time Value Ref Range Status   05/19/2023 08:29  (H) 0 - 100 mg/dL Final     Comment:     LDL Cholesterol:     Optimal           <100 mg/dl     Near Optimal      100-129 mg/dl     Above Optimal       Borderline High 130-159 mg/dl       High            160-189 mg/dl       Very High       >189 mg/dl         This screening LDL is a calculated result. It does not have the accuracy of the Direct Measured LDL in the monitoring of patients with hyperlipidemia and/or statin therapy. Direct Measure LDL (GGF645) must be ordered separately in these patients. Hemoglobin   Date/Time Value Ref Range Status   05/19/2023 08:29 AM 11.3 (L) 11.5 - 15.4 g/dL Final     Hematocrit   Date/Time Value Ref Range Status   05/19/2023 08:29 AM 35.1 34.8 - 46.1 % Final     Platelets   Date/Time Value Ref Range Status   05/19/2023 08:29  149 - 390 Thousands/uL Final     TSH 3RD GENERATON   Date/Time Value Ref Range Status   09/16/2022 08:23 AM 3.619 0.450 - 4.500 uIU/mL Final     Comment:     The recommended reference ranges for TSH during pregnancy are as follows:   First trimester 0.1 to 2.5 uIU/mL   Second trimester  0.2 to 3.0 uIU/mL   Third trimester 0.3 to 3.0 uIU/m    Note: Normal ranges may not apply to patients who are transgender, non-binary, or whose legal sex, sex at birth, and gender identity differ. Adult TSH (3rd generation) reference range follows the recommended guidelines of the American Thyroid Association, January, 2020. Free T4   Date/Time Value Ref Range Status   09/16/2022 08:23 AM 1.07 0.76 - 1.46 ng/dL Final     Comment:     Specimen collection should occur prior to Sulfasalazine administration due to the potential for falsely elevated results. Sodium   Date/Time Value Ref Range Status   05/19/2023 08:29  (L) 135 - 147 mmol/L Final     BUN   Date/Time Value Ref Range Status   05/19/2023 08:29 AM 31 (H) 5 - 25 mg/dL Final     Creatinine   Date/Time Value Ref Range Status   05/19/2023 08:29 AM 0.97 0.60 - 1.30 mg/dL Final     Comment:     Standardized to IDMS reference method      In chart    This note was created with voice recognition software.   Phonic, grammatical and spelling errors may be present within the note as a result.

## 2023-10-20 ENCOUNTER — APPOINTMENT (OUTPATIENT)
Dept: LAB | Facility: HOSPITAL | Age: 88
End: 2023-10-20
Payer: COMMERCIAL

## 2023-10-20 DIAGNOSIS — E78.2 MIXED HYPERLIPIDEMIA: ICD-10-CM

## 2023-10-20 DIAGNOSIS — I10 ESSENTIAL HYPERTENSION: ICD-10-CM

## 2023-10-20 LAB
ALBUMIN SERPL BCP-MCNC: 4.2 G/DL (ref 3.5–5)
ALP SERPL-CCNC: 166 U/L (ref 34–104)
ALT SERPL W P-5'-P-CCNC: 16 U/L (ref 7–52)
ANION GAP SERPL CALCULATED.3IONS-SCNC: 8 MMOL/L
AST SERPL W P-5'-P-CCNC: 25 U/L (ref 13–39)
BASOPHILS # BLD AUTO: 0.08 THOUSANDS/ÂΜL (ref 0–0.1)
BASOPHILS NFR BLD AUTO: 1 % (ref 0–1)
BILIRUB SERPL-MCNC: 0.56 MG/DL (ref 0.2–1)
BUN SERPL-MCNC: 21 MG/DL (ref 5–25)
CALCIUM SERPL-MCNC: 9.6 MG/DL (ref 8.4–10.2)
CHLORIDE SERPL-SCNC: 104 MMOL/L (ref 96–108)
CHOLEST SERPL-MCNC: 243 MG/DL
CO2 SERPL-SCNC: 29 MMOL/L (ref 21–32)
CREAT SERPL-MCNC: 0.85 MG/DL (ref 0.6–1.3)
EOSINOPHIL # BLD AUTO: 0.22 THOUSAND/ÂΜL (ref 0–0.61)
EOSINOPHIL NFR BLD AUTO: 3 % (ref 0–6)
ERYTHROCYTE [DISTWIDTH] IN BLOOD BY AUTOMATED COUNT: 13.2 % (ref 11.6–15.1)
GFR SERPL CREATININE-BSD FRML MDRD: 60 ML/MIN/1.73SQ M
GLUCOSE P FAST SERPL-MCNC: 92 MG/DL (ref 65–99)
HCT VFR BLD AUTO: 35 % (ref 34.8–46.1)
HDLC SERPL-MCNC: 94 MG/DL
HGB BLD-MCNC: 11.3 G/DL (ref 11.5–15.4)
IMM GRANULOCYTES # BLD AUTO: 0.03 THOUSAND/UL (ref 0–0.2)
IMM GRANULOCYTES NFR BLD AUTO: 0 % (ref 0–2)
LDLC SERPL CALC-MCNC: 137 MG/DL (ref 0–100)
LYMPHOCYTES # BLD AUTO: 2.4 THOUSANDS/ÂΜL (ref 0.6–4.47)
LYMPHOCYTES NFR BLD AUTO: 29 % (ref 14–44)
MCH RBC QN AUTO: 29.3 PG (ref 26.8–34.3)
MCHC RBC AUTO-ENTMCNC: 32.3 G/DL (ref 31.4–37.4)
MCV RBC AUTO: 91 FL (ref 82–98)
MONOCYTES # BLD AUTO: 0.59 THOUSAND/ÂΜL (ref 0.17–1.22)
MONOCYTES NFR BLD AUTO: 7 % (ref 4–12)
NEUTROPHILS # BLD AUTO: 4.89 THOUSANDS/ÂΜL (ref 1.85–7.62)
NEUTS SEG NFR BLD AUTO: 60 % (ref 43–75)
NONHDLC SERPL-MCNC: 149 MG/DL
NRBC BLD AUTO-RTO: 0 /100 WBCS
PLATELET # BLD AUTO: 280 THOUSANDS/UL (ref 149–390)
PMV BLD AUTO: 11.2 FL (ref 8.9–12.7)
POTASSIUM SERPL-SCNC: 3.8 MMOL/L (ref 3.5–5.3)
PROT SERPL-MCNC: 8.2 G/DL (ref 6.4–8.4)
RBC # BLD AUTO: 3.86 MILLION/UL (ref 3.81–5.12)
SODIUM SERPL-SCNC: 141 MMOL/L (ref 135–147)
TRIGL SERPL-MCNC: 58 MG/DL
WBC # BLD AUTO: 8.21 THOUSAND/UL (ref 4.31–10.16)

## 2023-10-20 PROCEDURE — 85025 COMPLETE CBC W/AUTO DIFF WBC: CPT

## 2023-10-20 PROCEDURE — 80053 COMPREHEN METABOLIC PANEL: CPT

## 2023-10-20 PROCEDURE — 80061 LIPID PANEL: CPT

## 2023-10-20 PROCEDURE — 36415 COLL VENOUS BLD VENIPUNCTURE: CPT

## 2023-11-07 ENCOUNTER — RA CDI HCC (OUTPATIENT)
Dept: OTHER | Facility: HOSPITAL | Age: 88
End: 2023-11-07

## 2023-11-07 NOTE — PROGRESS NOTES
[address] coding opportunities       Chart reviewed, no opportunity found: [address] Review     Patients Insurance     Medicare Insurance: [employer]

## 2023-11-15 ENCOUNTER — OFFICE VISIT (OUTPATIENT)
Dept: INTERNAL MEDICINE CLINIC | Facility: CLINIC | Age: 88
End: 2023-11-15
Payer: COMMERCIAL

## 2023-11-15 ENCOUNTER — RA CDI HCC (OUTPATIENT)
Dept: OTHER | Facility: HOSPITAL | Age: 88
End: 2023-11-15

## 2023-11-15 VITALS
OXYGEN SATURATION: 98 % | HEIGHT: 62 IN | BODY MASS INDEX: 20.06 KG/M2 | HEART RATE: 83 BPM | WEIGHT: 109 LBS | SYSTOLIC BLOOD PRESSURE: 122 MMHG | DIASTOLIC BLOOD PRESSURE: 80 MMHG

## 2023-11-15 DIAGNOSIS — I10 ESSENTIAL HYPERTENSION: ICD-10-CM

## 2023-11-15 DIAGNOSIS — G30.1 LATE ONSET ALZHEIMER'S DEMENTIA WITHOUT BEHAVIORAL DISTURBANCE (HCC): Primary | ICD-10-CM

## 2023-11-15 DIAGNOSIS — E78.49 OTHER HYPERLIPIDEMIA: ICD-10-CM

## 2023-11-15 DIAGNOSIS — F02.80 LATE ONSET ALZHEIMER'S DEMENTIA WITHOUT BEHAVIORAL DISTURBANCE (HCC): Primary | ICD-10-CM

## 2023-11-15 PROCEDURE — 99214 OFFICE O/P EST MOD 30 MIN: CPT | Performed by: INTERNAL MEDICINE

## 2023-11-15 RX ORDER — AMLODIPINE BESYLATE 2.5 MG/1
2.5 TABLET ORAL DAILY PRN
Qty: 180 TABLET | Refills: 1 | Status: SHIPPED | OUTPATIENT
Start: 2023-11-15

## 2023-11-15 RX ORDER — ROSUVASTATIN CALCIUM 5 MG/1
TABLET, COATED ORAL
Qty: 90 TABLET | Refills: 3 | Status: SHIPPED | OUTPATIENT
Start: 2023-11-15

## 2023-11-15 NOTE — PROGRESS NOTES
[address] [facility] coding opportunities       Chart reviewed, no opportunity found: CHART REVIEWED, [address]     Patients Insurance     Medicare Insurance: [employer]

## 2023-11-15 NOTE — PROGRESS NOTES
Assessment/Plan:     Long discussion with patient and family, we settled on using the amlodipine on an as-needed basis. Her  will monitor the pressure and then give her amlodipine if needed. For her cholesterol, we are going to try just Monday, Wednesday, Friday with the Crestor. But if that still gives her problems she will stay off of cholesterol medicine. We have tried several on her. Quality Measures:       Return in about 6 months (around 5/15/2024) for Regular visit and Medicare Wellness . No problem-specific Assessment & Plan notes found for this encounter. Diagnoses and all orders for this visit:    Late onset Alzheimer's dementia without behavioral disturbance (HCC)    Essential hypertension  -     amLODIPine (NORVASC) 2.5 mg tablet; Take 1 tablet (2.5 mg total) by mouth daily as needed (BP>140/90)    Other hyperlipidemia  -     rosuvastatin (CRESTOR) 5 mg tablet; 1 tab Mon, Wed, Fri          Subjective:      Patient ID: Karmen Engel is a 80 y.o. female. Patient comes in today for follow-up with her  and son. She started getting dizzy again, probably more lightheaded. Her  stopped her amlodipine and the Crestor and it has been a few weeks. She seems better. Pressure so far has been holding. But this has happened before and then her pressure gradually start increasing. Her dementia is about the same. Taking her other medicines as directed. No other complaints. No further additions to her history.         ALLERGIES:  Allergies   Allergen Reactions   • Latex    • Molds & Smuts    • Penicillins        CURRENT MEDICATIONS:    Current Outpatient Medications:   •  amLODIPine (NORVASC) 2.5 mg tablet, Take 1 tablet (2.5 mg total) by mouth daily as needed (BP>140/90), Disp: 180 tablet, Rfl: 1  •  aspirin (ECOTRIN LOW STRENGTH) 81 mg EC tablet, Take 1 tablet (81 mg total) by mouth daily, Disp: 30 tablet, Rfl: 1  •  Cyanocobalamin (B-12 PO), Take 1 tablet by mouth in the morning, Disp: , Rfl:   •  losartan (COZAAR) 50 mg tablet, Take 1 tablet (50 mg total) by mouth 2 (two) times a day, Disp: 60 tablet, Rfl: 3  •  rosuvastatin (CRESTOR) 5 mg tablet, 1 tab Mon, Wed, Fri, Disp: 90 tablet, Rfl: 3  •  Turmeric (CURCUMIN 95 PO), Take 1 tablet by mouth in the morning, Disp: , Rfl:   •  donepezil (ARICEPT) 5 mg tablet, Take 1 tablet (5 mg total) by mouth daily at bedtime (Patient not taking: Reported on 11/15/2023), Disp: 30 tablet, Rfl: 11    Current Facility-Administered Medications:   •  sodium hypochlorite (DAKIN'S HALF-STRENGTH) 0.25 percent topical solution 1 application, 1 application. , Irrigation, Daily, Misael Montano PA-C    ACTIVE PROBLEM LIST:  Patient Active Problem List   Diagnosis   • Allergic rhinitis   • Chronic insomnia   • Hypothyroidism, adult   • TIA (transient ischemic attack)   • Left carotid bruit   • Hyperlipidemia   • Dry skin dermatitis   • Osteoporosis   • Essential hypertension   • Memory loss   • Breast pain, right   • History of TIA (transient ischemic attack)   • Closed left hip fracture (HCC)   • Hyponatremia   • Late onset Alzheimer's dementia without behavioral disturbance (HCC)       PAST MEDICAL HISTORY:  Past Medical History:   Diagnosis Date   • Cognitive decline    • Hyperlipidemia    • Hypertension    • TIA (transient ischemic attack)        PAST SURGICAL HISTORY:  Past Surgical History:   Procedure Laterality Date   • FOOT SURGERY  2007   • HIP SURGERY Left 07/28/2022       FAMILY HISTORY:  Family History   Problem Relation Age of Onset   • Parkinsonism Mother    • Diabetes Father    • Hyperlipidemia Father    • Hypertension Father    • Other Father         cardiac Disorder   • No Known Problems Maternal Grandmother    • No Known Problems Maternal Grandfather    • No Known Problems Paternal Grandmother    • No Known Problems Paternal Grandfather    • No Known Problems Sister    • Cancer Maternal Aunt    • Breast cancer Maternal Aunt         age unknown   • No Known Problems Daughter    • No Known Problems Son        SOCIAL HISTORY:  Social History     Socioeconomic History   • Marital status: /Civil Union     Spouse name: Not on file   • Number of children: 2   • Years of education: Not on file   • Highest education level: Not on file   Occupational History   • Occupation: Retired   Tobacco Use   • Smoking status: Former     Types: Cigarettes     Start date:      Quit date:      Years since quittin.9   • Smokeless tobacco: Never   • Tobacco comments:     Quit smoking at 21years of age   Vaping Use   • Vaping Use: Never used   Substance and Sexual Activity   • Alcohol use: Yes     Comment: occasional   • Drug use: No   • Sexual activity: Not Currently     Partners: Male   Other Topics Concern   • Not on file   Social History Narrative    Caffeine use    Graduated from high school    Lives with adult children    Lives with     Denied: History of High risk sexual behavior     Social Determinants of Health     Financial Resource Strain: Not on file   Food Insecurity: Not on file   Transportation Needs: Not on file   Physical Activity: Not on file   Stress: Not on file   Social Connections: Not on file   Intimate Partner Violence: Not on file   Housing Stability: Not on file       Review of Systems   Respiratory:  Negative for shortness of breath. Cardiovascular:  Negative for chest pain. Gastrointestinal:  Negative for abdominal pain. Objective:  Vitals:    11/15/23 0958   BP: 122/80   BP Location: Left arm   Patient Position: Sitting   Cuff Size: Adult   Pulse: 83   SpO2: 98%   Weight: 49.4 kg (109 lb)   Height: 5' 2" (1.575 m)     Body mass index is 19.94 kg/m². Physical Exam  Vitals and nursing note reviewed. Constitutional:       Appearance: Normal appearance. Cardiovascular:      Rate and Rhythm: Normal rate and regular rhythm.    Pulmonary:      Effort: Pulmonary effort is normal.      Breath sounds: Normal breath sounds. Neurological:      Mental Status: She is alert. RESULTS:  Cholesterol   Date/Time Value Ref Range Status   10/20/2023 08:19  (H) See Comment mg/dL Final     Comment:     Cholesterol:         Pediatric <18 Years        Desirable          <170 mg/dL      Borderline High    170-199 mg/dL      High               >=200 mg/dL        Adult >=18 Years            Desirable        <200 mg/dL      Borderline High  200-239 mg/dL      High             >239 mg/dL       Triglycerides   Date/Time Value Ref Range Status   10/20/2023 08:19 AM 58 See Comment mg/dL Final     Comment:     Triglyceride:     0-9Y            <75mg/dL     10Y-17Y         <90 mg/dL       >=18Y     Normal          <150 mg/dL     Borderline High 150-199 mg/dL     High            200-499 mg/dL        Very High       >499 mg/dL    Specimen collection should occur prior to Metamizole administration due to the potential for falsely depressed results. HDL, Direct   Date/Time Value Ref Range Status   10/20/2023 08:19 AM 94 >=50 mg/dL Final     LDL Calculated   Date/Time Value Ref Range Status   10/20/2023 08:19  (H) 0 - 100 mg/dL Final     Comment:     LDL Cholesterol:     Optimal           <100 mg/dl     Near Optimal      100-129 mg/dl     Above Optimal       Borderline High 130-159 mg/dl       High            160-189 mg/dl       Very High       >189 mg/dl         This screening LDL is a calculated result. It does not have the accuracy of the Direct Measured LDL in the monitoring of patients with hyperlipidemia and/or statin therapy. Direct Measure LDL (ZMT416) must be ordered separately in these patients.      Hemoglobin   Date/Time Value Ref Range Status   10/20/2023 08:19 AM 11.3 (L) 11.5 - 15.4 g/dL Final     Hematocrit   Date/Time Value Ref Range Status   10/20/2023 08:19 AM 35.0 34.8 - 46.1 % Final     Platelets   Date/Time Value Ref Range Status   10/20/2023 08:19  149 - 390 Thousands/uL Final TSH 3RD GENERATON   Date/Time Value Ref Range Status   09/16/2022 08:23 AM 3.619 0.450 - 4.500 uIU/mL Final     Comment:     The recommended reference ranges for TSH during pregnancy are as follows:   First trimester 0.1 to 2.5 uIU/mL   Second trimester  0.2 to 3.0 uIU/mL   Third trimester 0.3 to 3.0 uIU/m    Note: Normal ranges may not apply to patients who are transgender, non-binary, or whose legal sex, sex at birth, and gender identity differ. Adult TSH (3rd generation) reference range follows the recommended guidelines of the American Thyroid Association, January, 2020. Free T4   Date/Time Value Ref Range Status   09/16/2022 08:23 AM 1.07 0.76 - 1.46 ng/dL Final     Comment:     Specimen collection should occur prior to Sulfasalazine administration due to the potential for falsely elevated results. Sodium   Date/Time Value Ref Range Status   10/20/2023 08:19  135 - 147 mmol/L Final     BUN   Date/Time Value Ref Range Status   10/20/2023 08:19 AM 21 5 - 25 mg/dL Final     Creatinine   Date/Time Value Ref Range Status   10/20/2023 08:19 AM 0.85 0.60 - 1.30 mg/dL Final     Comment:     Standardized to IDMS reference method      In chart    This note was created with voice recognition software. Phonic, grammatical and spelling errors may be present within the note as a result.

## 2024-05-08 ENCOUNTER — OFFICE VISIT (OUTPATIENT)
Dept: INTERNAL MEDICINE CLINIC | Facility: CLINIC | Age: 89
End: 2024-05-08
Payer: COMMERCIAL

## 2024-05-08 VITALS
SYSTOLIC BLOOD PRESSURE: 144 MMHG | OXYGEN SATURATION: 94 % | HEIGHT: 62 IN | TEMPERATURE: 97.6 F | HEART RATE: 66 BPM | WEIGHT: 118 LBS | BODY MASS INDEX: 21.71 KG/M2 | DIASTOLIC BLOOD PRESSURE: 72 MMHG

## 2024-05-08 DIAGNOSIS — R09.82 POST-NASAL DRIP: Primary | ICD-10-CM

## 2024-05-08 DIAGNOSIS — R05.3 CHRONIC COUGH: ICD-10-CM

## 2024-05-08 PROCEDURE — G2211 COMPLEX E/M VISIT ADD ON: HCPCS | Performed by: PHYSICIAN ASSISTANT

## 2024-05-08 PROCEDURE — 99213 OFFICE O/P EST LOW 20 MIN: CPT | Performed by: PHYSICIAN ASSISTANT

## 2024-05-08 NOTE — PROGRESS NOTES
Assessment/Plan:   Patient Instructions   Obtain over-the-counter Claritin (loratadine) 10 mg and take 1 daily.  It will take several days to a week to be effective.  Keep me informed.  If not effective we could always try the Astelin nasal spray (Astepro) as we discussed.    For your sleep difficulty you could give a trial of melatonin 5 mg nightly.     Quality Measures:   Depression Screening and Follow-up Plan: Patient was screened for depression during today's encounter. They screened negative with a PHQ-2 score of 0.         Return if symptoms worsen or fail to improve, for Next scheduled follow up.         Diagnoses and all orders for this visit:    Post-nasal drip    Chronic cough          Subjective:      Patient ID: Nikkie Culver is a 89 y.o. female.    Acute visit    Patient brought in by her  because of ongoing coughing.  Patient states, and  agrees that it has been going on for several weeks to months.  They reside in [place] in the winter and she was coughing there.  No real change in the cough here.  Admits to postnasal drip.  Denies runny nose, sneezing, itchy watery eyes or typical allergy symptoms.  She is not coughing a lot at night however states she does not sleep well.   notes she is up at night frequently.  No fever or chills.   concerned because when they were in [place] they had dinner with friends, 1 was coughing, after returning back to [place] the friends contacted them stating that one of them had pneumonia.        ALLERGIES:  Allergies   Allergen Reactions    Latex     Molds & Smuts     Penicillins        CURRENT MEDICATIONS:    Current Outpatient Medications:     amLODIPine (NORVASC) 2.5 mg tablet, Take 1 tablet (2.5 mg total) by mouth daily as needed (BP>140/90), Disp: 180 tablet, Rfl: 1    aspirin (ECOTRIN LOW STRENGTH) 81 mg EC tablet, Take 1 tablet (81 mg total) by mouth daily, Disp: 30 tablet, Rfl: 1    Cyanocobalamin (B-12 PO), Take 1 tablet by  mouth in the morning, Disp: , Rfl:     losartan (COZAAR) 50 mg tablet, Take 1 tablet (50 mg total) by mouth 2 (two) times a day, Disp: 60 tablet, Rfl: 3    rosuvastatin (CRESTOR) 5 mg tablet, 1 tab Mon, Wed, Fri, Disp: 90 tablet, Rfl: 3    Turmeric (CURCUMIN 95 PO), Take 1 tablet by mouth in the morning, Disp: , Rfl:     donepezil (ARICEPT) 5 mg tablet, Take 1 tablet (5 mg total) by mouth daily at bedtime (Patient not taking: Reported on 11/15/2023), Disp: 30 tablet, Rfl: 11    Current Facility-Administered Medications:     sodium hypochlorite (DAKIN'S HALF-STRENGTH) 0.25 percent topical solution 1 application, 1 application., Irrigation, Daily, Melecio Ga PA-C    ACTIVE PROBLEM LIST:  Patient Active Problem List   Diagnosis    Allergic rhinitis    Chronic insomnia    Hypothyroidism, adult    TIA (transient ischemic attack)    Left carotid bruit    Hyperlipidemia    Dry skin dermatitis    Osteoporosis    Essential hypertension    Memory loss    Breast pain, right    History of TIA (transient ischemic attack)    Closed left hip fracture (HCC)    Hyponatremia    Late onset Alzheimer's dementia without behavioral disturbance (HCC)       PAST MEDICAL HISTORY:  Past Medical History:   Diagnosis Date    Cognitive decline     Hyperlipidemia     Hypertension     TIA (transient ischemic attack)        PAST SURGICAL HISTORY:  Past Surgical History:   Procedure Laterality Date    FOOT SURGERY  2007    HIP SURGERY Left 07/28/2022       FAMILY HISTORY:  Family History   Problem Relation Age of Onset    Parkinsonism Mother     Diabetes Father     Hyperlipidemia Father     Hypertension Father     Other Father         cardiac Disorder    No Known Problems Maternal Grandmother     No Known Problems Maternal Grandfather     No Known Problems Paternal Grandmother     No Known Problems Paternal Grandfather     No Known Problems Sister     Cancer Maternal Aunt     Breast cancer Maternal Aunt         age unknown    No Known Problems  Daughter     No Known Problems Son        SOCIAL HISTORY:  Social History     Socioeconomic History    Marital status: /Civil Union     Spouse name: Not on file    Number of children: 2    Years of education: Not on file    Highest education level: Not on file   Occupational History    Occupation: Retired   Tobacco Use    Smoking status: Former     Current packs/day: 0.00     Types: Cigarettes     Start date:      Quit date:      Years since quittin.3    Smokeless tobacco: Never    Tobacco comments:     Quit smoking at 20 years of age   Vaping Use    Vaping status: Never Used   Substance and Sexual Activity    Alcohol use: Yes     Comment: occasional    Drug use: No    Sexual activity: Not Currently     Partners: Male   Other Topics Concern    Not on file   Social History Narrative    Caffeine use    Graduated from high school    Lives with adult children    Lives with     Denied: History of High risk sexual behavior     Social Determinants of Health     Financial Resource Strain: Not on file   Food Insecurity: Not on file   Transportation Needs: Not on file   Physical Activity: Not on file   Stress: Not on file   Social Connections: Not on file   Intimate Partner Violence: Not on file   Housing Stability: Not on file       Review of Systems   Constitutional:  Negative for activity change, chills, fatigue and fever.   HENT:  Negative for congestion, ear pain, postnasal drip, rhinorrhea, sinus pressure, sinus pain, sneezing, sore throat and voice change.    Eyes:  Negative for discharge, redness and itching.   Respiratory:  Positive for cough. Negative for chest tightness, shortness of breath and wheezing.    Cardiovascular:  Negative for chest pain, palpitations and leg swelling.   Gastrointestinal:  Negative for abdominal pain.   Genitourinary:  Negative for difficulty urinating.   Musculoskeletal:  Negative for arthralgias and myalgias.   Skin:  Negative for rash.  "  Allergic/Immunologic: Negative for immunocompromised state.   Neurological:  Negative for dizziness, syncope, weakness, light-headedness and headaches.   Hematological:  Negative for adenopathy. Does not bruise/bleed easily.   Psychiatric/Behavioral:  Positive for sleep disturbance. Negative for dysphoric mood. The patient is not nervous/anxious.          Objective:  Vitals:    05/08/24 0746   BP: 144/72   BP Location: Left arm   Patient Position: Sitting   Pulse: 66   Temp: 97.6 °F (36.4 °C)   SpO2: 94%   Weight: 53.5 kg (118 lb)   Height: 5' 2\" (1.575 m)     Body mass index is 21.58 kg/m².     Physical Exam  Vitals and nursing note reviewed.   Constitutional:       General: She is not in acute distress.     Appearance: She is well-developed. She is not ill-appearing.   HENT:      Head: Normocephalic and atraumatic.      Right Ear: Tympanic membrane, ear canal and external ear normal.      Left Ear: Tympanic membrane, ear canal and external ear normal.      Nose: Nose normal. No congestion or rhinorrhea.      Mouth/Throat:      Mouth: Mucous membranes are moist.      Pharynx: Oropharynx is clear.      Comments: Increased clear postnasal drainage  Eyes:      Extraocular Movements: Extraocular movements intact.      Conjunctiva/sclera: Conjunctivae normal.      Pupils: Pupils are equal, round, and reactive to light.   Neck:      Thyroid: No thyromegaly.      Vascular: No carotid bruit or JVD.   Cardiovascular:      Rate and Rhythm: Normal rate and regular rhythm.      Heart sounds: Normal heart sounds.      No S3 or S4 sounds.   Pulmonary:      Effort: Pulmonary effort is normal. No respiratory distress.      Breath sounds: Normal breath sounds. No stridor. No wheezing, rhonchi or rales.   Musculoskeletal:      Cervical back: Neck supple.      Right lower leg: No edema.      Left lower leg: No edema.   Lymphadenopathy:      Cervical: No cervical adenopathy.   Skin:     General: Skin is warm and dry.      Findings: " No rash.   Neurological:      General: No focal deficit present.      Mental Status: She is alert and oriented to person, place, and time. Mental status is at baseline.   Psychiatric:         Mood and Affect: Mood normal.         Behavior: Behavior normal.           RESULTS:  Cholesterol   Date/Time Value Ref Range Status   10/20/2023 08:19  (H) See Comment mg/dL Final     Comment:     Cholesterol:         Pediatric <18 Years        Desirable          <170 mg/dL      Borderline High    170-199 mg/dL      High               >=200 mg/dL        Adult >=18 Years            Desirable        <200 mg/dL      Borderline High  200-239 mg/dL      High             >239 mg/dL       Triglycerides   Date/Time Value Ref Range Status   10/20/2023 08:19 AM 58 See Comment mg/dL Final     Comment:     Triglyceride:     0-9Y            <75mg/dL     10Y-17Y         <90 mg/dL       >=18Y     Normal          <150 mg/dL     Borderline High 150-199 mg/dL     High            200-499 mg/dL        Very High       >499 mg/dL    Specimen collection should occur prior to Metamizole administration due to the potential for falsely depressed results.     HDL, Direct   Date/Time Value Ref Range Status   10/20/2023 08:19 AM 94 >=50 mg/dL Final     LDL Calculated   Date/Time Value Ref Range Status   10/20/2023 08:19  (H) 0 - 100 mg/dL Final     Comment:     LDL Cholesterol:     Optimal           <100 mg/dl     Near Optimal      100-129 mg/dl     Above Optimal       Borderline High 130-159 mg/dl       High            160-189 mg/dl       Very High       >189 mg/dl         This screening LDL is a calculated result.   It does not have the accuracy of the Direct Measured LDL in the monitoring of patients with hyperlipidemia and/or statin therapy.   Direct Measure LDL (LSN989) must be ordered separately in these patients.     Hemoglobin   Date/Time Value Ref Range Status   10/20/2023 08:19 AM 11.3 (L) 11.5 - 15.4 g/dL Final     Hematocrit    Date/Time Value Ref Range Status   10/20/2023 08:19 AM 35.0 34.8 - 46.1 % Final     Platelets   Date/Time Value Ref Range Status   10/20/2023 08:19  149 - 390 Thousands/uL Final     TSH 3RD GENERATON   Date/Time Value Ref Range Status   09/16/2022 08:23 AM 3.619 0.450 - 4.500 uIU/mL Final     Comment:     The recommended reference ranges for TSH during pregnancy are as follows:   First trimester 0.1 to 2.5 uIU/mL   Second trimester  0.2 to 3.0 uIU/mL   Third trimester 0.3 to 3.0 uIU/m    Note: Normal ranges may not apply to patients who are transgender, non-binary, or whose legal sex, sex at birth, and gender identity differ.  Adult TSH (3rd generation) reference range follows the recommended guidelines of the American Thyroid Association, January, 2020.     Free T4   Date/Time Value Ref Range Status   09/16/2022 08:23 AM 1.07 0.76 - 1.46 ng/dL Final     Comment:     Specimen collection should occur prior to Sulfasalazine administration due to the potential for falsely elevated results.     Sodium   Date/Time Value Ref Range Status   10/20/2023 08:19  135 - 147 mmol/L Final   08/15/2022 12:00  135 - 145 mmol/L Final     BUN   Date/Time Value Ref Range Status   10/20/2023 08:19 AM 21 5 - 25 mg/dL Final   08/15/2022 12:00 AM 17 7 - 25 mg/dL Final     Creatinine   Date/Time Value Ref Range Status   10/20/2023 08:19 AM 0.85 0.60 - 1.30 mg/dL Final     Comment:     Standardized to IDMS reference method   08/15/2022 12:00 AM 0.57 0.40 - 1.10 mg/dL Final      In chart    This note was created with voice recognition software.  Phonic, grammatical and spelling errors may be present within the note as a result.

## 2024-05-08 NOTE — PATIENT INSTRUCTIONS
Obtain over-the-counter Claritin (loratadine) 10 mg and take 1 daily.  It will take several days to a week to be effective.  Keep me informed.  If not effective we could always try the Astelin nasal spray (Astepro) as we discussed.    For your sleep difficulty you could give a trial of melatonin 5 mg nightly.

## 2024-05-09 ENCOUNTER — RA CDI HCC (OUTPATIENT)
Dept: OTHER | Facility: HOSPITAL | Age: 89
End: 2024-05-09

## 2024-05-13 ENCOUNTER — RA CDI HCC (OUTPATIENT)
Dept: OTHER | Facility: HOSPITAL | Age: 89
End: 2024-05-13

## 2024-05-21 ENCOUNTER — OFFICE VISIT (OUTPATIENT)
Dept: INTERNAL MEDICINE CLINIC | Facility: CLINIC | Age: 89
End: 2024-05-21
Payer: COMMERCIAL

## 2024-05-21 VITALS
SYSTOLIC BLOOD PRESSURE: 112 MMHG | HEART RATE: 79 BPM | HEIGHT: 62 IN | WEIGHT: 117.4 LBS | OXYGEN SATURATION: 98 % | DIASTOLIC BLOOD PRESSURE: 62 MMHG | BODY MASS INDEX: 21.6 KG/M2

## 2024-05-21 DIAGNOSIS — I10 ESSENTIAL HYPERTENSION: ICD-10-CM

## 2024-05-21 DIAGNOSIS — E78.49 OTHER HYPERLIPIDEMIA: ICD-10-CM

## 2024-05-21 DIAGNOSIS — Z00.00 MEDICARE ANNUAL WELLNESS VISIT, SUBSEQUENT: Primary | ICD-10-CM

## 2024-05-21 DIAGNOSIS — G30.1 LATE ONSET ALZHEIMER'S DEMENTIA WITHOUT BEHAVIORAL DISTURBANCE (HCC): ICD-10-CM

## 2024-05-21 DIAGNOSIS — F02.80 LATE ONSET ALZHEIMER'S DEMENTIA WITHOUT BEHAVIORAL DISTURBANCE (HCC): ICD-10-CM

## 2024-05-21 PROBLEM — S72.002A CLOSED LEFT HIP FRACTURE (HCC): Status: RESOLVED | Noted: 2022-07-24 | Resolved: 2024-05-21

## 2024-05-21 PROCEDURE — 99214 OFFICE O/P EST MOD 30 MIN: CPT | Performed by: INTERNAL MEDICINE

## 2024-05-21 PROCEDURE — G0439 PPPS, SUBSEQ VISIT: HCPCS | Performed by: INTERNAL MEDICINE

## 2024-05-21 RX ORDER — AMLODIPINE BESYLATE 5 MG/1
5 TABLET ORAL DAILY
COMMUNITY
Start: 2024-02-21 | End: 2024-05-21 | Stop reason: SDUPTHER

## 2024-05-21 NOTE — PATIENT INSTRUCTIONS
Medicare Preventive Visit Patient Instructions  Thank you for completing your Welcome to Medicare Visit or Medicare Annual Wellness Visit today. Your next wellness visit will be due in one year (5/22/2025).  The screening/preventive services that you may require over the next 5-10 years are detailed below. Some tests may not apply to you based off risk factors and/or age. Screening tests ordered at today's visit but not completed yet may show as past due. Also, please note that scanned in results may not display below.  Preventive Screenings:  Service Recommendations Previous Testing/Comments   Colorectal Cancer Screening  * Colonoscopy    * Fecal Occult Blood Test (FOBT)/Fecal Immunochemical Test (FIT)  * Fecal DNA/Cologuard Test  * Flexible Sigmoidoscopy Age: 45-75 years old   Colonoscopy: every 10 years (may be performed more frequently if at higher risk)  OR  FOBT/FIT: every 1 year  OR  Cologuard: every 3 years  OR  Sigmoidoscopy: every 5 years  Screening may be recommended earlier than age 45 if at higher risk for colorectal cancer. Also, an individualized decision between you and your healthcare provider will decide whether screening between the ages of 76-85 would be appropriate. Colonoscopy: Not on file  FOBT/FIT: Not on file  Cologuard: Not on file  Sigmoidoscopy: Not on file    Screening Not Indicated     Breast Cancer Screening Age: 40+ years old  Frequency: every 1-2 years  Not required if history of left and right mastectomy Mammogram: 01/06/2022        Cervical Cancer Screening Between the ages of 21-29, pap smear recommended once every 3 years.   Between the ages of 30-65, can perform pap smear with HPV co-testing every 5 years.   Recommendations may differ for women with a history of total hysterectomy, cervical cancer, or abnormal pap smears in past. Pap Smear: Not on file    Screening Not Indicated   Hepatitis C Screening Once for adults born between 1945 and 1965  More frequently in patients at  high risk for Hepatitis C Hep C Antibody: Not on file        Diabetes Screening 1-2 times per year if you're at risk for diabetes or have pre-diabetes Fasting glucose: 92 mg/dL (10/20/2023)  A1C: No results in last 5 years (No results in last 5 years)  Screening Current   Cholesterol Screening Once every 5 years if you don't have a lipid disorder. May order more often based on risk factors. Lipid panel: 10/20/2023    Screening Not Indicated  History Lipid Disorder     Other Preventive Screenings Covered by Medicare:  Abdominal Aortic Aneurysm (AAA) Screening: covered once if your at risk. You're considered to be at risk if you have a family history of AAA.  Lung Cancer Screening: covers low dose CT scan once per year if you meet all of the following conditions: (1) Age 55-77; (2) No signs or symptoms of lung cancer; (3) Current smoker or have quit smoking within the last 15 years; (4) You have a tobacco smoking history of at least 20 pack years (packs per day multiplied by number of years you smoked); (5) You get a written order from a healthcare provider.  Glaucoma Screening: covered annually if you're considered high risk: (1) You have diabetes OR (2) Family history of glaucoma OR (3)  aged 50 and older OR (4)  American aged 65 and older  Osteoporosis Screening: covered every 2 years if you meet one of the following conditions: (1) You're estrogen deficient and at risk for osteoporosis based off medical history and other findings; (2) Have a vertebral abnormality; (3) On glucocorticoid therapy for more than 3 months; (4) Have primary hyperparathyroidism; (5) On osteoporosis medications and need to assess response to drug therapy.   Last bone density test (DXA Scan): 09/21/2017.  HIV Screening: covered annually if you're between the age of 15-65. Also covered annually if you are younger than 15 and older than 65 with risk factors for HIV infection. For pregnant patients, it is covered up to  3 times per pregnancy.    Immunizations:  Immunization Recommendations   Influenza Vaccine Annual influenza vaccination during flu season is recommended for all persons aged >= 6 months who do not have contraindications   Pneumococcal Vaccine   * Pneumococcal conjugate vaccine = PCV13 (Prevnar 13), PCV15 (Vaxneuvance), PCV20 (Prevnar 20)  * Pneumococcal polysaccharide vaccine = PPSV23 (Pneumovax) Adults 19-65 yo with certain risk factors or if 65+ yo  If never received any pneumonia vaccine: recommend Prevnar 20 (PCV20)  Give PCV20 if previously received 1 dose of PCV13 or PPSV23   Hepatitis B Vaccine 3 dose series if at intermediate or high risk (ex: diabetes, end stage renal disease, liver disease)   Respiratory syncytial virus (RSV) Vaccine - COVERED BY MEDICARE PART D  * RSVPreF3 (Arexvy) CDC recommends that adults 60 years of age and older may receive a single dose of RSV vaccine using shared clinical decision-making (SCDM)   Tetanus (Td) Vaccine - COST NOT COVERED BY MEDICARE PART B Following completion of primary series, a booster dose should be given every 10 years to maintain immunity against tetanus. Td may also be given as tetanus wound prophylaxis.   Tdap Vaccine - COST NOT COVERED BY MEDICARE PART B Recommended at least once for all adults. For pregnant patients, recommended with each pregnancy.   Shingles Vaccine (Shingrix) - COST NOT COVERED BY MEDICARE PART B  2 shot series recommended in those 19 years and older who have or will have weakened immune systems or those 50 years and older     Health Maintenance Due:  There are no preventive care reminders to display for this patient.  Immunizations Due:  There are no preventive care reminders to display for this patient.  Advance Directives   What are advance directives?  Advance directives are legal documents that state your wishes and plans for medical care. These plans are made ahead of time in case you lose your ability to make decisions for  yourself. Advance directives can apply to any medical decision, such as the treatments you want, and if you want to donate organs.   What are the types of advance directives?  There are many types of advance directives, and each state has rules about how to use them. You may choose a combination of any of the following:  Living will:  This is a written record of the treatment you want. You can also choose which treatments you do not want, which to limit, and which to stop at a certain time. This includes surgery, medicine, IV fluid, and tube feedings.   Durable power of  for healthcare (DPAHC):  This is a written record that states who you want to make healthcare choices for you when you are unable to make them for yourself. This person, called a proxy, is usually a family member or a friend. You may choose more than 1 proxy.  Do not resuscitate (DNR) order:  A DNR order is used in case your heart stops beating or you stop breathing. It is a request not to have certain forms of treatment, such as CPR. A DNR order may be included in other types of advance directives.  Medical directive:  This covers the care that you want if you are in a coma, near death, or unable to make decisions for yourself. You can list the treatments you want for each condition. Treatment may include pain medicine, surgery, blood transfusions, dialysis, IV or tube feedings, and a ventilator (breathing machine).  Values history:  This document has questions about your views, beliefs, and how you feel and think about life. This information can help others choose the care that you would choose.  Why are advance directives important?  An advance directive helps you control your care. Although spoken wishes may be used, it is better to have your wishes written down. Spoken wishes can be misunderstood, or not followed. Treatments may be given even if you do not want them. An advance directive may make it easier for your family to make  difficult choices about your care.       © Copyright [employer] 2018 Information is for End User's use only and may not be sold, redistributed or otherwise used for commercial purposes. All illustrations and images included in CareNotes® are the copyrighted property of [employer] or [employer]

## 2024-05-21 NOTE — PROGRESS NOTES
Ambulatory Visit  Name: Nikkie Culver      : 1934      MRN: [id number]  Encounter Provider: Chetan Goncalves MD  Encounter Date: 2024   Encounter department: [facility] INTERNAL MEDICINE [place]    Assessment & Plan   1. Medicare annual wellness visit, subsequent  2. Essential hypertension  -     CBC and differential; Future  -     Comprehensive metabolic panel; Future  -     Lipid panel; Future  3. Late onset Alzheimer's dementia without behavioral disturbance (HCC)  4. Other hyperlipidemia     Her chronic problems are stable.  For hypertension continue losartan 50 mg twice a day and her amlodipine 2.5 mg daily as needed.  For her hyperlipidemia, keep working on diet.  Unable to tolerate meds.  For her dementia, remains calm.  Again, unable to tolerate meds.  Continue all other medications.    Continue followup with all specialists.   Continue diet and exercise.    Ordered labs for next visit.  Preventive health issues were discussed with patient, and age appropriate screening tests were ordered as noted in patient's After Visit Summary. Personalized health advice and appropriate referrals for health education or preventive services given if needed, as noted in patient's After Visit Summary.    History of Present Illness     Patient comes in today for routine follow-up and Medicare wellness with her .  They are back from [place].  They report they will probably not be going back there every year now.  Getting too difficult.  Her  reports she had no problems while in [place].  No ER visits.  No hospitalizations.  Her blood pressure remain controlled with her current meds.  He kept an eye on it.  They try to watch her diet for the cholesterol.  She has been unable to tolerate the meds.  Her dementia is stable.  She remains calm.  Again, also could not tolerate those meds.  They deny any new complaints.  No further additions to her history.       Patient Care Team:  Chetan Goncalves MD as  PCP - General (Internal Medicine)  MD Prema James MD as Surgeon (Surgical Oncology)    Review of Systems   Respiratory:  Negative for shortness of breath.    Cardiovascular:  Negative for chest pain.   Gastrointestinal:  Negative for abdominal pain.     Medical History Reviewed by provider this encounter:       Annual Wellness Visit Questionnaire   Nikkie is here for her Subsequent Wellness visit.     Health Risk Assessment:   Patient rates overall health as good. Patient feels that their physical health rating is same. Patient is very satisfied with their life. Eyesight was rated as slightly worse. Hearing was rated as slightly worse. Patient feels that their emotional and mental health rating is same. Patients states they are never, rarely angry. Patient states they are always unusually tired/fatigued. Pain experienced in the last 7 days has been none. Patient states that she has experienced no weight loss or gain in last 6 months.     Depression Screening:   PHQ-2 Score: 0      Fall Risk Screening:   In the past year, patient has experienced: no history of falling in past year      Urinary Incontinence Screening:   Patient has not leaked urine accidently in the last six months.     Home Safety:  Patient has trouble with stairs inside or outside of their home. Patient has working smoke alarms and has working carbon monoxide detector. Home safety hazards include: none.     Nutrition:   Current diet is Regular.     Medications:   Patient is not currently taking any over-the-counter supplements. Patient is not able to manage medications.     Activities of Daily Living (ADLs)/Instrumental Activities of Daily Living (IADLs):   Walk and transfer into and out of bed and chair?: Yes  Dress and groom yourself?: Yes    Bathe or shower yourself?: Yes    Feed yourself? Yes  Do your laundry/housekeeping?: Yes  Manage your money, pay your bills and track your expenses?: No  Make your own meals?:  Yes    Do your own shopping?: Yes    Previous Hospitalizations:   Any hospitalizations or ED visits within the last 12 months?: No      Advance Care Planning:   Living will: Yes    Durable POA for healthcare: Yes    Advanced directive: Yes    Advanced directive counseling given: Yes      Cognitive Screening:   Provider or family/friend/caregiver concerned regarding cognition?: No    PREVENTIVE SCREENINGS      Cardiovascular Screening:    General: Screening Not Indicated and History Lipid Disorder      Diabetes Screening:     General: Screening Current      Colorectal Cancer Screening:     General: Screening Not Indicated      Breast Cancer Screening:     General: Screening Not Indicated      Cervical Cancer Screening:    General: Screening Not Indicated      Osteoporosis Screening:    General: Screening Not Indicated and History Osteoporosis      Abdominal Aortic Aneurysm (AAA) Screening:        General: Screening Not Indicated      Lung Cancer Screening:     General: Screening Not Indicated      Hepatitis C Screening:    General: Screening Not Indicated    Screening, Brief Intervention, and Referral to Treatment (SBIRT)    Screening  Typical number of drinks in a day: 0  Typical number of drinks in a week: 0  Interpretation: Low risk drinking behavior.    AUDIT-C Screenin) How often did you have a drink containing alcohol in the past year? never  2) How many drinks did you have on a typical day when you were drinking in the past year? 0  3) How often did you have 6 or more drinks on one occasion in the past year? never    AUDIT-C Score: 0  Interpretation: Score 0-2 (female): Negative screen for alcohol misuse    Single Item Drug Screening:  How often have you used an illegal drug (including marijuana) or a prescription medication for non-medical reasons in the past year? never    Single Item Drug Screen Score: 0  Interpretation: Negative screen for possible drug use disorder    Brief Intervention  Alcohol &  "drug use screenings were reviewed. No concerns regarding substance use disorder identified.     Social Determinants of Health     Food Insecurity: No Food Insecurity (5/21/2024)    Hunger Vital Sign    • Worried About Running Out of Food in the Last Year: Never true    • Ran Out of Food in the Last Year: Never true   Transportation Needs: No Transportation Needs (5/21/2024)    PRAPARE - Transportation    • Lack of Transportation (Medical): No    • Lack of Transportation (Non-Medical): No   Housing Stability: Low Risk  (5/21/2024)    Housing Stability Vital Sign    • Unable to Pay for Housing in the Last Year: No    • Number of Times Moved in the Last Year: 1    • Homeless in the Last Year: No   Utilities: Not At Risk (5/21/2024)    [facility] Utilities    • Threatened with loss of utilities: No     No results found.    Objective     /62 (BP Location: Left arm, Patient Position: Sitting, Cuff Size: Standard)   Pulse 79   Ht 5' 2\" (1.575 m)   Wt 53.3 kg (117 lb 6.4 oz)   SpO2 98%   BMI 21.47 kg/m²     Physical Exam  Vitals and nursing note reviewed.   Constitutional:       Appearance: She is well-developed.   Cardiovascular:      Rate and Rhythm: Normal rate and regular rhythm.   Pulmonary:      Effort: Pulmonary effort is normal.      Breath sounds: Normal breath sounds.   Abdominal:      General: Abdomen is flat.      Tenderness: There is no abdominal tenderness.   Musculoskeletal:      Right lower leg: No edema.      Left lower leg: No edema.   Neurological:      Mental Status: She is alert and oriented to person, place, and time.   Psychiatric:         Behavior: Behavior normal.         Thought Content: Thought content normal.         Judgment: Judgment normal.       Administrative Statements           "

## 2024-05-24 ENCOUNTER — APPOINTMENT (OUTPATIENT)
Dept: LAB | Facility: HOSPITAL | Age: 89
End: 2024-05-24
Payer: COMMERCIAL

## 2024-05-24 DIAGNOSIS — I10 ESSENTIAL HYPERTENSION: ICD-10-CM

## 2024-05-24 LAB
ALBUMIN SERPL BCP-MCNC: 4.3 G/DL (ref 3.5–5)
ALP SERPL-CCNC: 81 U/L (ref 34–104)
ALT SERPL W P-5'-P-CCNC: 10 U/L (ref 7–52)
ANION GAP SERPL CALCULATED.3IONS-SCNC: 6 MMOL/L (ref 4–13)
AST SERPL W P-5'-P-CCNC: 18 U/L (ref 13–39)
BASOPHILS # BLD AUTO: 0.05 THOUSANDS/ÂΜL (ref 0–0.1)
BASOPHILS NFR BLD AUTO: 1 % (ref 0–1)
BILIRUB SERPL-MCNC: 0.41 MG/DL (ref 0.2–1)
BUN SERPL-MCNC: 25 MG/DL (ref 5–25)
CALCIUM SERPL-MCNC: 9.5 MG/DL (ref 8.4–10.2)
CHLORIDE SERPL-SCNC: 107 MMOL/L (ref 96–108)
CHOLEST SERPL-MCNC: 325 MG/DL
CO2 SERPL-SCNC: 28 MMOL/L (ref 21–32)
CREAT SERPL-MCNC: 0.75 MG/DL (ref 0.6–1.3)
EOSINOPHIL # BLD AUTO: 0.18 THOUSAND/ÂΜL (ref 0–0.61)
EOSINOPHIL NFR BLD AUTO: 2 % (ref 0–6)
ERYTHROCYTE [DISTWIDTH] IN BLOOD BY AUTOMATED COUNT: 14 % (ref 11.6–15.1)
GFR SERPL CREATININE-BSD FRML MDRD: 70 ML/MIN/1.73SQ M
GLUCOSE P FAST SERPL-MCNC: 90 MG/DL (ref 65–99)
HCT VFR BLD AUTO: 35.8 % (ref 34.8–46.1)
HDLC SERPL-MCNC: 94 MG/DL
HGB BLD-MCNC: 11.6 G/DL (ref 11.5–15.4)
IMM GRANULOCYTES # BLD AUTO: 0.02 THOUSAND/UL (ref 0–0.2)
IMM GRANULOCYTES NFR BLD AUTO: 0 % (ref 0–2)
LDLC SERPL CALC-MCNC: 218 MG/DL (ref 0–100)
LYMPHOCYTES # BLD AUTO: 2.49 THOUSANDS/ÂΜL (ref 0.6–4.47)
LYMPHOCYTES NFR BLD AUTO: 30 % (ref 14–44)
MCH RBC QN AUTO: 30.4 PG (ref 26.8–34.3)
MCHC RBC AUTO-ENTMCNC: 32.4 G/DL (ref 31.4–37.4)
MCV RBC AUTO: 94 FL (ref 82–98)
MONOCYTES # BLD AUTO: 0.61 THOUSAND/ÂΜL (ref 0.17–1.22)
MONOCYTES NFR BLD AUTO: 7 % (ref 4–12)
NEUTROPHILS # BLD AUTO: 5.05 THOUSANDS/ÂΜL (ref 1.85–7.62)
NEUTS SEG NFR BLD AUTO: 60 % (ref 43–75)
NONHDLC SERPL-MCNC: 231 MG/DL
NRBC BLD AUTO-RTO: 0 /100 WBCS
PLATELET # BLD AUTO: 276 THOUSANDS/UL (ref 149–390)
PMV BLD AUTO: 11.1 FL (ref 8.9–12.7)
POTASSIUM SERPL-SCNC: 4.3 MMOL/L (ref 3.5–5.3)
PROT SERPL-MCNC: 7.3 G/DL (ref 6.4–8.4)
RBC # BLD AUTO: 3.82 MILLION/UL (ref 3.81–5.12)
SODIUM SERPL-SCNC: 141 MMOL/L (ref 135–147)
TRIGL SERPL-MCNC: 65 MG/DL
WBC # BLD AUTO: 8.4 THOUSAND/UL (ref 4.31–10.16)

## 2024-05-24 PROCEDURE — 36415 COLL VENOUS BLD VENIPUNCTURE: CPT

## 2024-05-24 PROCEDURE — 85025 COMPLETE CBC W/AUTO DIFF WBC: CPT

## 2024-05-24 PROCEDURE — 80061 LIPID PANEL: CPT

## 2024-05-24 PROCEDURE — 80053 COMPREHEN METABOLIC PANEL: CPT

## 2024-09-20 ENCOUNTER — RA CDI HCC (OUTPATIENT)
Dept: OTHER | Facility: HOSPITAL | Age: 89
End: 2024-09-20

## 2024-09-25 ENCOUNTER — RA CDI HCC (OUTPATIENT)
Dept: OTHER | Facility: HOSPITAL | Age: 89
End: 2024-09-25

## 2024-10-02 ENCOUNTER — OFFICE VISIT (OUTPATIENT)
Dept: INTERNAL MEDICINE CLINIC | Facility: CLINIC | Age: 89
End: 2024-10-02
Payer: COMMERCIAL

## 2024-10-02 VITALS
SYSTOLIC BLOOD PRESSURE: 130 MMHG | HEART RATE: 91 BPM | DIASTOLIC BLOOD PRESSURE: 70 MMHG | HEIGHT: 62 IN | OXYGEN SATURATION: 97 % | WEIGHT: 126.4 LBS | BODY MASS INDEX: 23.26 KG/M2

## 2024-10-02 DIAGNOSIS — F02.80 LATE ONSET ALZHEIMER'S DEMENTIA WITHOUT BEHAVIORAL DISTURBANCE (HCC): ICD-10-CM

## 2024-10-02 DIAGNOSIS — F51.04 CHRONIC INSOMNIA: Primary | ICD-10-CM

## 2024-10-02 DIAGNOSIS — G30.1 LATE ONSET ALZHEIMER'S DEMENTIA WITHOUT BEHAVIORAL DISTURBANCE (HCC): ICD-10-CM

## 2024-10-02 DIAGNOSIS — I10 ESSENTIAL HYPERTENSION: ICD-10-CM

## 2024-10-02 DIAGNOSIS — Z23 ENCOUNTER FOR IMMUNIZATION: ICD-10-CM

## 2024-10-02 PROCEDURE — 90662 IIV NO PRSV INCREASED AG IM: CPT | Performed by: INTERNAL MEDICINE

## 2024-10-02 PROCEDURE — G0008 ADMIN INFLUENZA VIRUS VAC: HCPCS | Performed by: INTERNAL MEDICINE

## 2024-10-02 PROCEDURE — 99213 OFFICE O/P EST LOW 20 MIN: CPT | Performed by: INTERNAL MEDICINE

## 2024-10-02 NOTE — ASSESSMENT & PLAN NOTE
We all agree that any medication would be very risky given her age and the dementia.  She can certainly try the magnesium.  Reviewed proper sleep habits with them.

## 2024-11-18 DIAGNOSIS — I10 ESSENTIAL HYPERTENSION: ICD-10-CM

## 2024-11-18 NOTE — TELEPHONE ENCOUNTER
Patient states that she takes 2 tablets daily.    Reason for call:   [x] Refill   [] Prior Auth  [] Other:     Office: Chetan Goncalves MD   [x] PCP/Provider -   [] Specialty/Provider -     Medication: amLODIPine (NORVASC) 2.5 mg tablet       Quantity: 180    Pharmacy: [facility] - [place], [place] - [address] [place] [address]     Does the patient have enough for 3 days?   [x] Yes   [] No - Send as HP to POD

## 2024-11-19 RX ORDER — AMLODIPINE BESYLATE 2.5 MG/1
2.5 TABLET ORAL DAILY PRN
Qty: 90 TABLET | Refills: 1 | Status: SHIPPED | OUTPATIENT
Start: 2024-11-19

## 2024-11-20 ENCOUNTER — OFFICE VISIT (OUTPATIENT)
Dept: INTERNAL MEDICINE CLINIC | Facility: CLINIC | Age: 89
End: 2024-11-20
Payer: COMMERCIAL

## 2024-11-20 VITALS
HEIGHT: 62 IN | BODY MASS INDEX: 23.96 KG/M2 | DIASTOLIC BLOOD PRESSURE: 78 MMHG | HEART RATE: 81 BPM | WEIGHT: 130.2 LBS | SYSTOLIC BLOOD PRESSURE: 134 MMHG | OXYGEN SATURATION: 94 %

## 2024-11-20 DIAGNOSIS — E78.49 OTHER HYPERLIPIDEMIA: ICD-10-CM

## 2024-11-20 DIAGNOSIS — I10 ESSENTIAL HYPERTENSION: Primary | ICD-10-CM

## 2024-11-20 DIAGNOSIS — F02.80 LATE ONSET ALZHEIMER'S DEMENTIA WITHOUT BEHAVIORAL DISTURBANCE (HCC): ICD-10-CM

## 2024-11-20 DIAGNOSIS — G30.1 LATE ONSET ALZHEIMER'S DEMENTIA WITHOUT BEHAVIORAL DISTURBANCE (HCC): ICD-10-CM

## 2024-11-20 PROCEDURE — G2211 COMPLEX E/M VISIT ADD ON: HCPCS | Performed by: INTERNAL MEDICINE

## 2024-11-20 PROCEDURE — 99214 OFFICE O/P EST MOD 30 MIN: CPT | Performed by: INTERNAL MEDICINE

## 2024-11-20 NOTE — ASSESSMENT & PLAN NOTE
Continue to monitor.  Too many side effects from the meds.  Work on diet.  Orders:    Lipid panel; Future

## 2024-11-20 NOTE — PROGRESS NOTES
"Name: Nikkie Culver      : 1934      MRN: [id number]  Encounter Provider: Chetan Goncalves MD  Encounter Date: 2024   Encounter department: [facility] INTERNAL MEDICINE [place]  :  Assessment & Plan  Essential hypertension  Controlled.  Continue current medication.  Orders:    CBC and differential; Future    Comprehensive metabolic panel; Future    TSH, 3rd generation with Free T4 reflex; Future    Other hyperlipidemia  Continue to monitor.  Too many side effects from the meds.  Work on diet.  Orders:    Lipid panel; Future    Late onset Alzheimer's dementia without behavioral disturbance (HCC)  Stable.              History of Present Illness     Patient comes in today for routine follow-up with her .  Her blood pressure is controlled.  She remains off the cholesterol medicine.  They are trying to watch her diet.  Her dementia is stable.  No acting out.  She is still having trouble sleeping.  No other complaints today.  No further additions to her history.      Review of Systems   Respiratory:  Negative for shortness of breath.    Cardiovascular:  Negative for chest pain.   Gastrointestinal:  Negative for abdominal pain.          Objective   /78 (BP Location: Left arm, Patient Position: Sitting, Cuff Size: Standard)   Pulse 81   Ht 5' 2\" (1.575 m)   Wt 59.1 kg (130 lb 3.2 oz)   SpO2 94%   BMI 23.81 kg/m²      Physical Exam  Vitals and nursing note reviewed.   Constitutional:       Appearance: Normal appearance. She is well-developed.   Cardiovascular:      Rate and Rhythm: Normal rate and regular rhythm.      Heart sounds: Normal heart sounds.   Pulmonary:      Effort: Pulmonary effort is normal.      Breath sounds: Normal breath sounds.   Abdominal:      Palpations: Abdomen is soft.      Tenderness: There is no abdominal tenderness.   Neurological:      Mental Status: She is alert and oriented to person, place, and time.   Psychiatric:         Mood and Affect: Mood normal.    "      Behavior: Behavior normal.

## 2024-11-20 NOTE — ASSESSMENT & PLAN NOTE
Controlled.  Continue current medication.  Orders:    CBC and differential; Future    Comprehensive metabolic panel; Future    TSH, 3rd generation with Free T4 reflex; Future

## 2024-12-23 ENCOUNTER — TELEPHONE (OUTPATIENT)
Age: 89
End: 2024-12-23

## 2024-12-23 NOTE — TELEPHONE ENCOUNTER
Patients son called in requesting FMLA forms to be filled out so he can come help his mom with her care. Patient is declining  and in her early stages of dementia. Son is stating she is also getting very weak, falling, and is scared she will not be able to get move around soon. Son lives in [place] and wanted to take off for three months starting 2/13-5/27/25. He states this was discussed at a previous visit due to patients decline. Son would like a call with an update when available. He says there is a second part of his FMLA that needs to be completed online. Fax number given to son he will be faxing over forms. Please advise.

## 2024-12-30 NOTE — TELEPHONE ENCOUNTER
Patients son does have some additional information to provide prior to the la paperwork being filled out, please advise

## 2025-02-25 ENCOUNTER — TELEPHONE (OUTPATIENT)
Age: OVER 89
End: 2025-02-25

## 2025-02-25 NOTE — TELEPHONE ENCOUNTER
Kyler, pt's son and POA, called regarding his mother. Wanted to update her pcp on some recent incidents she'd had the last few months. # separate occasions where she fell.   - End of November, fell and hurt her shoulder (unclear which one)  - 12/6/24, fell at 2am and hurt her back, pt was not found until 6am by her son. She was visiting him.   - Around 3-4 weeks ago in January, pt fell while out to dinner with her , ended up getting 2 black eyes from this.     Pt did not seek medical attention for any of these incidents, but Kyler wanted to inform pcp of this and have it documented to be safe.

## 2025-03-25 ENCOUNTER — TELEPHONE (OUTPATIENT)
Age: OVER 89
End: 2025-03-25

## 2025-03-25 NOTE — TELEPHONE ENCOUNTER
Pt son, Kyler, called to let Dr. Goncalves know that his mom fell on 3/14 while standing and cooking breakfast.  Her legs just gave out.  She was ok and did not go to the ER.  Kyler said he is doing basic exercises with her and has her on the stationary bike for 2-3 minutes. His goal is to do this 2-3 times a week with her.  He would like to discuss possible pt at her next appt in May.

## 2025-03-27 ENCOUNTER — TELEPHONE (OUTPATIENT)
Age: OVER 89
End: 2025-03-27

## 2025-03-27 NOTE — TELEPHONE ENCOUNTER
Patient's son called he wanted to let Dr. Garcia know that he will be sending over a Family Leave Insurance form for the [facility]. He needs it to be filled out and added into his chart if possible.     The contact number for the company is   [phone]    Please contact them with any questions or concerns.    Please advise

## 2025-03-28 ENCOUNTER — TELEPHONE (OUTPATIENT)
Dept: INTERNAL MEDICINE CLINIC | Facility: CLINIC | Age: OVER 89
End: 2025-03-28

## 2025-03-28 NOTE — TELEPHONE ENCOUNTER
JENNY Chávez advising form has been completed online. Also advised on the message he needs to be added to his mother's consent paperwork moving forward.

## 2025-05-22 ENCOUNTER — RA CDI HCC (OUTPATIENT)
Dept: OTHER | Facility: HOSPITAL | Age: OVER 89
End: 2025-05-22

## 2025-05-23 ENCOUNTER — APPOINTMENT (OUTPATIENT)
Dept: LAB | Facility: HOSPITAL | Age: OVER 89
End: 2025-05-23
Payer: COMMERCIAL

## 2025-05-23 DIAGNOSIS — E78.49 OTHER HYPERLIPIDEMIA: ICD-10-CM

## 2025-05-23 DIAGNOSIS — I10 ESSENTIAL HYPERTENSION: ICD-10-CM

## 2025-05-23 LAB
ALBUMIN SERPL BCG-MCNC: 4.1 G/DL (ref 3.5–5)
ALP SERPL-CCNC: 93 U/L (ref 34–104)
ALT SERPL W P-5'-P-CCNC: 10 U/L (ref 7–52)
ANION GAP SERPL CALCULATED.3IONS-SCNC: 6 MMOL/L (ref 4–13)
AST SERPL W P-5'-P-CCNC: 19 U/L (ref 13–39)
BASOPHILS # BLD AUTO: 0.06 THOUSANDS/ÂΜL (ref 0–0.1)
BASOPHILS NFR BLD AUTO: 1 % (ref 0–1)
BILIRUB SERPL-MCNC: 0.41 MG/DL (ref 0.2–1)
BUN SERPL-MCNC: 26 MG/DL (ref 5–25)
CALCIUM SERPL-MCNC: 9.2 MG/DL (ref 8.4–10.2)
CHLORIDE SERPL-SCNC: 106 MMOL/L (ref 96–108)
CHOLEST SERPL-MCNC: 290 MG/DL (ref ?–200)
CO2 SERPL-SCNC: 28 MMOL/L (ref 21–32)
CREAT SERPL-MCNC: 0.85 MG/DL (ref 0.6–1.3)
EOSINOPHIL # BLD AUTO: 0.28 THOUSAND/ÂΜL (ref 0–0.61)
EOSINOPHIL NFR BLD AUTO: 3 % (ref 0–6)
ERYTHROCYTE [DISTWIDTH] IN BLOOD BY AUTOMATED COUNT: 14.5 % (ref 11.6–15.1)
GFR SERPL CREATININE-BSD FRML MDRD: 60 ML/MIN/1.73SQ M
GLUCOSE P FAST SERPL-MCNC: 94 MG/DL (ref 65–99)
HCT VFR BLD AUTO: 37.5 % (ref 34.8–46.1)
HDLC SERPL-MCNC: 85 MG/DL
HGB BLD-MCNC: 12 G/DL (ref 11.5–15.4)
IMM GRANULOCYTES # BLD AUTO: 0.02 THOUSAND/UL (ref 0–0.2)
IMM GRANULOCYTES NFR BLD AUTO: 0 % (ref 0–2)
LDLC SERPL CALC-MCNC: 187 MG/DL (ref 0–100)
LYMPHOCYTES # BLD AUTO: 2.31 THOUSANDS/ÂΜL (ref 0.6–4.47)
LYMPHOCYTES NFR BLD AUTO: 25 % (ref 14–44)
MCH RBC QN AUTO: 29.4 PG (ref 26.8–34.3)
MCHC RBC AUTO-ENTMCNC: 32 G/DL (ref 31.4–37.4)
MCV RBC AUTO: 92 FL (ref 82–98)
MONOCYTES # BLD AUTO: 0.71 THOUSAND/ÂΜL (ref 0.17–1.22)
MONOCYTES NFR BLD AUTO: 8 % (ref 4–12)
NEUTROPHILS # BLD AUTO: 6.06 THOUSANDS/ÂΜL (ref 1.85–7.62)
NEUTS SEG NFR BLD AUTO: 63 % (ref 43–75)
NONHDLC SERPL-MCNC: 205 MG/DL
NRBC BLD AUTO-RTO: 0 /100 WBCS
PLATELET # BLD AUTO: 272 THOUSANDS/UL (ref 149–390)
PMV BLD AUTO: 11.4 FL (ref 8.9–12.7)
POTASSIUM SERPL-SCNC: 4.5 MMOL/L (ref 3.5–5.3)
PROT SERPL-MCNC: 7.1 G/DL (ref 6.4–8.4)
RBC # BLD AUTO: 4.08 MILLION/UL (ref 3.81–5.12)
SODIUM SERPL-SCNC: 140 MMOL/L (ref 135–147)
T4 FREE SERPL-MCNC: 0.76 NG/DL (ref 0.61–1.12)
TRIGL SERPL-MCNC: 91 MG/DL (ref ?–150)
TSH SERPL DL<=0.05 MIU/L-ACNC: 6.87 UIU/ML (ref 0.45–4.5)
WBC # BLD AUTO: 9.44 THOUSAND/UL (ref 4.31–10.16)

## 2025-05-23 PROCEDURE — 80061 LIPID PANEL: CPT

## 2025-05-23 PROCEDURE — 84439 ASSAY OF FREE THYROXINE: CPT

## 2025-05-23 PROCEDURE — 36415 COLL VENOUS BLD VENIPUNCTURE: CPT

## 2025-05-23 PROCEDURE — 80053 COMPREHEN METABOLIC PANEL: CPT

## 2025-05-23 PROCEDURE — 84443 ASSAY THYROID STIM HORMONE: CPT

## 2025-05-23 PROCEDURE — 85025 COMPLETE CBC W/AUTO DIFF WBC: CPT

## 2025-05-29 ENCOUNTER — OFFICE VISIT (OUTPATIENT)
Dept: INTERNAL MEDICINE CLINIC | Facility: CLINIC | Age: OVER 89
End: 2025-05-29
Payer: COMMERCIAL

## 2025-05-29 VITALS
BODY MASS INDEX: 23.66 KG/M2 | HEART RATE: 82 BPM | DIASTOLIC BLOOD PRESSURE: 74 MMHG | HEIGHT: 62 IN | RESPIRATION RATE: 16 BRPM | OXYGEN SATURATION: 96 % | WEIGHT: 128.6 LBS | SYSTOLIC BLOOD PRESSURE: 122 MMHG

## 2025-05-29 DIAGNOSIS — J30.0 VASOMOTOR RHINITIS: ICD-10-CM

## 2025-05-29 DIAGNOSIS — Z99.89 DEPENDENCE ON CANE: ICD-10-CM

## 2025-05-29 DIAGNOSIS — E78.49 OTHER HYPERLIPIDEMIA: ICD-10-CM

## 2025-05-29 DIAGNOSIS — Z00.00 MEDICARE ANNUAL WELLNESS VISIT, SUBSEQUENT: Primary | ICD-10-CM

## 2025-05-29 DIAGNOSIS — F02.80 LATE ONSET ALZHEIMER'S DEMENTIA WITHOUT BEHAVIORAL DISTURBANCE (HCC): ICD-10-CM

## 2025-05-29 DIAGNOSIS — G30.1 LATE ONSET ALZHEIMER'S DEMENTIA WITHOUT BEHAVIORAL DISTURBANCE (HCC): ICD-10-CM

## 2025-05-29 DIAGNOSIS — E03.9 HYPOTHYROIDISM, ADULT: ICD-10-CM

## 2025-05-29 DIAGNOSIS — I10 ESSENTIAL HYPERTENSION: ICD-10-CM

## 2025-05-29 PROCEDURE — 99214 OFFICE O/P EST MOD 30 MIN: CPT | Performed by: INTERNAL MEDICINE

## 2025-05-29 PROCEDURE — G2211 COMPLEX E/M VISIT ADD ON: HCPCS | Performed by: INTERNAL MEDICINE

## 2025-05-29 PROCEDURE — G0439 PPPS, SUBSEQ VISIT: HCPCS | Performed by: INTERNAL MEDICINE

## 2025-05-29 NOTE — ASSESSMENT & PLAN NOTE
Not controlled but she has not been able to tolerate any of the medicines without side effects.  Orders:  •  Lipid panel; Future

## 2025-05-29 NOTE — PATIENT INSTRUCTIONS
Medicare Preventive Visit Patient Instructions  Thank you for completing your Welcome to Medicare Visit or Medicare Annual Wellness Visit today. Your next wellness visit will be due in one year (5/30/2026).  The screening/preventive services that you may require over the next 5-10 years are detailed below. Some tests may not apply to you based off risk factors and/or age. Screening tests ordered at today's visit but not completed yet may show as past due. Also, please note that scanned in results may not display below.  Preventive Screenings:  Service Recommendations Previous Testing/Comments   Colorectal Cancer Screening  * Colonoscopy    * Fecal Occult Blood Test (FOBT)/Fecal Immunochemical Test (FIT)  * Fecal DNA/Cologuard Test  * Flexible Sigmoidoscopy Age: 45-75 years old   Colonoscopy: every 10 years (may be performed more frequently if at higher risk)  OR  FOBT/FIT: every 1 year  OR  Cologuard: every 3 years  OR  Sigmoidoscopy: every 5 years  Screening may be recommended earlier than age 45 if at higher risk for colorectal cancer. Also, an individualized decision between you and your healthcare provider will decide whether screening between the ages of 76-85 would be appropriate. Colonoscopy: Not on file  FOBT/FIT: Not on file  Cologuard: Not on file  Sigmoidoscopy: Not on file    Screening Not Indicated     Breast Cancer Screening Age: 40+ years old  Frequency: every 1-2 years  Not required if history of left and right mastectomy Mammogram: 01/06/2022        Cervical Cancer Screening Between the ages of 21-29, pap smear recommended once every 3 years.   Between the ages of 30-65, can perform pap smear with HPV co-testing every 5 years.   Recommendations may differ for women with a history of total hysterectomy, cervical cancer, or abnormal pap smears in past. Pap Smear: Not on file    Screening Not Indicated   Hepatitis C Screening Once for adults born between 1945 and 1965  More frequently in patients at  high risk for Hepatitis C Hep C Antibody: Not on file        Diabetes Screening 1-2 times per year if you're at risk for diabetes or have pre-diabetes Fasting glucose: 94 mg/dL (5/23/2025)  A1C: No results in last 5 years (No results in last 5 years)  Screening Current   Cholesterol Screening Once every 5 years if you don't have a lipid disorder. May order more often based on risk factors. Lipid panel: 05/23/2025    Screening Not Indicated  History Lipid Disorder     Other Preventive Screenings Covered by Medicare:  Abdominal Aortic Aneurysm (AAA) Screening: covered once if your at risk. You're considered to be at risk if you have a family history of AAA.  Lung Cancer Screening: covers low dose CT scan once per year if you meet all of the following conditions: (1) Age 55-77; (2) No signs or symptoms of lung cancer; (3) Current smoker or have quit smoking within the last 15 years; (4) You have a tobacco smoking history of at least 20 pack years (packs per day multiplied by number of years you smoked); (5) You get a written order from a healthcare provider.  Glaucoma Screening: covered annually if you're considered high risk: (1) You have diabetes OR (2) Family history of glaucoma OR (3)  aged 50 and older OR (4)  American aged 65 and older  Osteoporosis Screening: covered every 2 years if you meet one of the following conditions: (1) You're estrogen deficient and at risk for osteoporosis based off medical history and other findings; (2) Have a vertebral abnormality; (3) On glucocorticoid therapy for more than 3 months; (4) Have primary hyperparathyroidism; (5) On osteoporosis medications and need to assess response to drug therapy.   Last bone density test (DXA Scan): 09/21/2017.  HIV Screening: covered annually if you're between the age of 15-65. Also covered annually if you are younger than 15 and older than 65 with risk factors for HIV infection. For pregnant patients, it is covered up to 3  times per pregnancy.    Immunizations:  Immunization Recommendations   Influenza Vaccine Annual influenza vaccination during flu season is recommended for all persons aged >= 6 months who do not have contraindications   Pneumococcal Vaccine   * Pneumococcal conjugate vaccine = PCV13 (Prevnar 13), PCV15 (Vaxneuvance), PCV20 (Prevnar 20)  * Pneumococcal polysaccharide vaccine = PPSV23 (Pneumovax) Adults 19-63 yo with certain risk factors or if 65+ yo  If never received any pneumonia vaccine: recommend Prevnar 20 (PCV20)  Give PCV20 if previously received 1 dose of PCV13 or PPSV23   Hepatitis B Vaccine 3 dose series if at intermediate or high risk (ex: diabetes, end stage renal disease, liver disease)   Respiratory syncytial virus (RSV) Vaccine - COVERED BY MEDICARE PART D  * RSVPreF3 (Arexvy) CDC recommends that adults 60 years of age and older may receive a single dose of RSV vaccine using shared clinical decision-making (SCDM)   Tetanus (Td) Vaccine - COST NOT COVERED BY MEDICARE PART B Following completion of primary series, a booster dose should be given every 10 years to maintain immunity against tetanus. Td may also be given as tetanus wound prophylaxis.   Tdap Vaccine - COST NOT COVERED BY MEDICARE PART B Recommended at least once for all adults. For pregnant patients, recommended with each pregnancy.   Shingles Vaccine (Shingrix) - COST NOT COVERED BY MEDICARE PART B  2 shot series recommended in those 19 years and older who have or will have weakened immune systems or those 50 years and older     Health Maintenance Due:  There are no preventive care reminders to display for this patient.  Immunizations Due:  There are no preventive care reminders to display for this patient.  Advance Directives   What are advance directives?  Advance directives are legal documents that state your wishes and plans for medical care. These plans are made ahead of time in case you lose your ability to make decisions for yourself.  Advance directives can apply to any medical decision, such as the treatments you want, and if you want to donate organs.   What are the types of advance directives?  There are many types of advance directives, and each state has rules about how to use them. You may choose a combination of any of the following:  Living will:  This is a written record of the treatment you want. You can also choose which treatments you do not want, which to limit, and which to stop at a certain time. This includes surgery, medicine, IV fluid, and tube feedings.   Durable power of  for healthcare (DPAHC):  This is a written record that states who you want to make healthcare choices for you when you are unable to make them for yourself. This person, called a proxy, is usually a family member or a friend. You may choose more than 1 proxy.  Do not resuscitate (DNR) order:  A DNR order is used in case your heart stops beating or you stop breathing. It is a request not to have certain forms of treatment, such as CPR. A DNR order may be included in other types of advance directives.  Medical directive:  This covers the care that you want if you are in a coma, near death, or unable to make decisions for yourself. You can list the treatments you want for each condition. Treatment may include pain medicine, surgery, blood transfusions, dialysis, IV or tube feedings, and a ventilator (breathing machine).  Values history:  This document has questions about your views, beliefs, and how you feel and think about life. This information can help others choose the care that you would choose.  Why are advance directives important?  An advance directive helps you control your care. Although spoken wishes may be used, it is better to have your wishes written down. Spoken wishes can be misunderstood, or not followed. Treatments may be given even if you do not want them. An advance directive may make it easier for your family to make difficult  choices about your care.   Fall Prevention    Fall prevention  includes ways to make your home and other areas safer. It also includes ways you can move more carefully to prevent a fall. Health conditions that cause changes in your blood pressure, vision, or muscle strength and coordination may increase your risk for falls. Medicines may also increase your risk for falls if they make you dizzy, weak, or sleepy.   Fall prevention tips:   Stand or sit up slowly.    Use assistive devices as directed.    Wear shoes that fit well and have soles that .    Wear a personal alarm.    Stay active.    Manage your medical conditions.    Home Safety Tips:  Add items to prevent falls in the bathroom.    Keep paths clear.    Install bright lights in your home.    Keep items you use often on shelves within reach.    Paint or place reflective tape on the edges of your stairs.    Urinary Incontinence   Urinary incontinence (UI)  is when you lose control of your bladder. UI develops because your bladder cannot store or empty urine properly. The 3 most common types of UI are stress incontinence, urge incontinence, or both.  Medicines:   May be given to help strengthen your bladder control. Report any side effects of medication to your healthcare provider.  Do pelvic muscle exercises often:  Your pelvic muscles help you stop urinating. Squeeze these muscles tight for 5 seconds, then relax for 5 seconds. Gradually work up to squeezing for 10 seconds. Do 3 sets of 15 repetitions a day, or as directed. This will help strengthen your pelvic muscles and improve bladder control.  Train your bladder:  Go to the bathroom at set times, such as every 2 hours, even if you do not feel the urge to go. You can also try to hold your urine when you feel the urge to go. For example, hold your urine for 5 minutes when you feel the urge to go. As that becomes easier, hold your urine for 10 minutes.   Self-care:   Keep a UI record.  Write down how  often you leak urine and how much you leak. Make a note of what you were doing when you leaked urine.  Drink liquids as directed. You may need to limit the amount of liquid you drink to help control your urine leakage. Do not drink any liquid right before you go to bed. Limit or do not have drinks that contain caffeine or alcohol.   Prevent constipation.  Eat a variety of high-fiber foods. Good examples are high-fiber cereals, beans, vegetables, and whole-grain breads. Walking is the best way to trigger your intestines to have a bowel movement.  Exercise regularly and maintain a healthy weight.  Weight loss and exercise will decrease pressure on your bladder and help you control your leakage.   Use a catheter as directed  to help empty your bladder. A catheter is a tiny, plastic tube that is put into your bladder to drain your urine.   Go to behavior therapy as directed.  Behavior therapy may be used to help you learn to control your urge to urinate.     © Copyright [employer] 2018 Information is for End User's use only and may not be sold, redistributed or otherwise used for commercial purposes. All illustrations and images included in CareNotes® are the copyrighted property of A.[employer].A[employer]M., Inc. or [employer]

## 2025-05-29 NOTE — PROGRESS NOTES
Name: Nikkie Culver      : 1934      MRN: [id number]  Encounter Provider: Chetan Goncalves MD  Encounter Date: 2025   Encounter department: [facility] INTERNAL MEDICINE [place]  :  Assessment & Plan  Medicare annual wellness visit, subsequent         Essential hypertension  Controlled.  Seems to be doing better with compression stockings.  Encouraged her to keep using them.  Orders:  •  CBC and differential; Future  •  Comprehensive metabolic panel; Future    Other hyperlipidemia  Not controlled but she has not been able to tolerate any of the medicines without side effects.  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       Hypothyroidism, adult  Continue off of medicines.  Continue to monitor.  Orders:  •  T4, free; Future  •  TSH, 3rd generation; Future    Vasomotor rhinitis  Suggested Astepro nasal spray.       Dependence on cane            Preventive health issues were discussed with patient, and age appropriate screening tests were ordered as noted in patient's After Visit Summary. Personalized health advice and appropriate referrals for health education or preventive services given if needed, as noted in patient's After Visit Summary.    History of Present Illness     Patient comes in today for routine follow-up and Medicare wellness with her .  Her blood pressure has been doing okay.  She had another episode of syncope at home her  reports.  Now they make sure she gets up slowly in the morning and eat something but she also has been doing well wearing compression stockings.  Her cholesterol is about the same.  She cannot tolerate the medicines.  Her thyroid levels are okay.  Dementia appears to be about the same.  Still calm.  Taking her medicines as directed.  Still having trouble with sleep and states she does not nap during the day but her  says otherwise.  Also complaining of constant  runny nose, clear discharge.       Patient Care Team:  Chetan Goncalves MD as PCP - General (Internal Medicine)  MD Prema James MD as Surgeon (Surgical Oncology)    Review of Systems   Respiratory:  Negative for shortness of breath.    Cardiovascular:  Negative for chest pain.   Gastrointestinal:  Negative for abdominal pain.     Medical History Reviewed by provider this encounter:       Annual Wellness Visit Questionnaire   Nikkie is here for her Subsequent Wellness visit.     Health Risk Assessment:   Patient rates overall health as very good. Patient feels that their physical health rating is same. Patient is satisfied with their life. Eyesight was rated as slightly worse. Hearing was rated as slightly worse. Patient feels that their emotional and mental health rating is same. Patients states they are never, rarely angry. Patient states they are sometimes unusually tired/fatigued. Pain experienced in the last 7 days has been none. Patient states that she has experienced no weight loss or gain in last 6 months.     Depression Screening:   PHQ-2 Score: 0      Fall Risk Screening:   In the past year, patient has experienced: history of falling in past year    Number of falls: 1  Injured during fall?: No    Feels unsteady when standing or walking?: Yes    Worried about falling?: Yes      Urinary Incontinence Screening:   Patient has leaked urine accidently in the last six months.     Home Safety:  Patient has trouble with stairs inside or outside of their home. Patient has working smoke alarms and has working carbon monoxide detector. Home safety hazards include: none.     Nutrition:   Current diet is Regular.     Medications:   Patient is not currently taking any over-the-counter supplements. Patient is not able to manage medications.     Activities of Daily Living (ADLs)/Instrumental Activities of Daily Living (IADLs):   Walk and transfer into and out of bed and chair?: Yes  Dress and  groom yourself?: Yes    Bathe or shower yourself?: Yes    Feed yourself? Yes  Do your laundry/housekeeping?: Yes  Manage your money, pay your bills and track your expenses?: No  Make your own meals?: Yes    Do your own shopping?: Yes    Previous Hospitalizations:   Any hospitalizations or ED visits within the last 12 months?: No      Advance Care Planning:   Living will: Yes    Durable POA for healthcare: Yes    Advanced directive: Yes    Advanced directive counseling given: Yes      Cognitive Screening:   Provider or family/friend/caregiver concerned regarding cognition?: Yes    Cognition Comments: Has known issues    Preventive Screenings      Cardiovascular Screening:    General: Screening Not Indicated and History Lipid Disorder      Diabetes Screening:     General: Screening Current      Colorectal Cancer Screening:     General: Screening Not Indicated      Breast Cancer Screening:     General: Screening Not Indicated      Cervical Cancer Screening:    General: Screening Not Indicated      Osteoporosis Screening:    General: Screening Not Indicated and History Osteoporosis      Abdominal Aortic Aneurysm (AAA) Screening:        General: Screening Not Indicated      Lung Cancer Screening:     General: Screening Not Indicated      Hepatitis C Screening:    General: Screening Not Indicated    Immunizations:    - Risks/benefits immunizations discussed      Screening, Brief Intervention, and Referral to Treatment (SBIRT)     Screening  Typical number of drinks in a day: 0  Typical number of drinks in a week: 0  Interpretation: Low risk drinking behavior.    AUDIT-C Screenin) How often did you have a drink containing alcohol in the past year? never  2) How many drinks did you have on a typical day when you were drinking in the past year? 0  3) How often did you have 6 or more drinks on one occasion in the past year? never    AUDIT-C Score: 0  Interpretation: Score 0-2 (female): Negative screen for alcohol  "misuse    Single Item Drug Screening:  How often have you used an illegal drug (including marijuana) or a prescription medication for non-medical reasons in the past year? never    Single Item Drug Screen Score: 0  Interpretation: Negative screen for possible drug use disorder    Brief Intervention  Alcohol & drug use screenings were reviewed. No concerns regarding substance use disorder identified.     Social Drivers of Health     Food Insecurity: No Food Insecurity (5/29/2025)    Hunger Vital Sign    • Worried About Running Out of Food in the Last Year: Never true    • Ran Out of Food in the Last Year: Never true   Transportation Needs: No Transportation Needs (5/29/2025)    PRAPARE - Transportation    • Lack of Transportation (Medical): No    • Lack of Transportation (Non-Medical): No   Housing Stability: Low Risk  (5/29/2025)    Housing Stability Vital Sign    • Unable to Pay for Housing in the Last Year: No    • Number of Times Moved in the Last Year: 1    • Homeless in the Last Year: No   Utilities: Not At Risk (5/29/2025)    [facility] Utilities    • Threatened with loss of utilities: No     No results found.    Objective   /74 (BP Location: Right arm, Patient Position: Sitting, Cuff Size: Adult)   Pulse 82   Resp 16   Ht 5' 2\" (1.575 m)   Wt 58.3 kg (128 lb 9.6 oz)   SpO2 96%   BMI 23.52 kg/m²     Physical Exam  Vitals and nursing note reviewed.   Constitutional:       Appearance: She is well-developed.     Cardiovascular:      Rate and Rhythm: Normal rate and regular rhythm.   Pulmonary:      Effort: Pulmonary effort is normal.      Breath sounds: Normal breath sounds.   Abdominal:      General: Abdomen is flat.      Tenderness: There is no abdominal tenderness.     Musculoskeletal:      Right lower leg: No edema.      Left lower leg: No edema.     Neurological:      Mental Status: She is alert. Mental status is at baseline.     Psychiatric:         Behavior: Behavior normal.         "

## 2025-05-29 NOTE — ASSESSMENT & PLAN NOTE
Controlled.  Seems to be doing better with compression stockings.  Encouraged her to keep using them.  Orders:  •  CBC and differential; Future  •  Comprehensive metabolic panel; Future

## 2025-05-29 NOTE — ASSESSMENT & PLAN NOTE
Continue off of medicines.  Continue to monitor.  Orders:  •  T4, free; Future  •  TSH, 3rd generation; Future

## 2025-06-10 DIAGNOSIS — I10 ESSENTIAL HYPERTENSION: ICD-10-CM

## 2025-06-10 RX ORDER — AMLODIPINE BESYLATE 2.5 MG/1
2.5 TABLET ORAL DAILY PRN
Qty: 90 TABLET | Refills: 1 | Status: SHIPPED | OUTPATIENT
Start: 2025-06-10